# Patient Record
Sex: FEMALE | Race: WHITE | Employment: PART TIME | URBAN - METROPOLITAN AREA
[De-identification: names, ages, dates, MRNs, and addresses within clinical notes are randomized per-mention and may not be internally consistent; named-entity substitution may affect disease eponyms.]

---

## 2017-01-16 ENCOUNTER — ULTRASOUND ENCOUNTER (OUTPATIENT)
Dept: OBGYN CLINIC | Facility: CLINIC | Age: 37
End: 2017-01-16

## 2017-01-16 ENCOUNTER — OFFICE VISIT (OUTPATIENT)
Dept: OBGYN CLINIC | Facility: CLINIC | Age: 37
End: 2017-01-16

## 2017-01-16 VITALS
SYSTOLIC BLOOD PRESSURE: 104 MMHG | WEIGHT: 142 LBS | BODY MASS INDEX: 22.82 KG/M2 | HEIGHT: 66 IN | DIASTOLIC BLOOD PRESSURE: 78 MMHG

## 2017-01-16 DIAGNOSIS — O09.523 AMA (ADVANCED MATERNAL AGE) MULTIGRAVIDA 35+, THIRD TRIMESTER: ICD-10-CM

## 2017-01-16 DIAGNOSIS — Z3A.33 33 WEEKS GESTATION OF PREGNANCY: Primary | ICD-10-CM

## 2017-01-16 DIAGNOSIS — O09.93 HIGH-RISK PREGNANCY, THIRD TRIMESTER: ICD-10-CM

## 2017-01-16 DIAGNOSIS — Z36.89 ENCOUNTER FOR FETAL ANATOMIC SURVEY: ICD-10-CM

## 2017-01-16 PROCEDURE — 59025 FETAL NON-STRESS TEST: CPT | Performed by: OBSTETRICS & GYNECOLOGY

## 2017-01-16 PROCEDURE — 76816 OB US FOLLOW-UP PER FETUS: CPT | Performed by: OBSTETRICS & GYNECOLOGY

## 2017-01-16 NOTE — PROGRESS NOTES
Quick Note:    Ultrasound for ama  33w6d  Measuring 32w2d  efw 1888 gm(18.1%)  Bpd 7.74 cm (5%)  HC 30.18cm (10.2%)  AC 27.47cm (4.3%)  FL 6.26cm (9.7%)  HL 5.56cm (19.2%)  jenelle 14.27cm  fhr 132    ______

## 2017-01-16 NOTE — PROGRESS NOTES
No complaints  U/s today shows SGA with lagging AC, BPD, HC, FL  Spoke to [facility], they will see pt in the next day or so for dopplers  nst today--reactive  Above d/w pt and   Questions are answered  rtc 1 wk with nst

## 2017-01-16 NOTE — PATIENT INSTRUCTIONS
FETAL MOVEMENT CHART    Begin counting the baby's movements when you awake in the morning, or at approximately 9:00 a.m. Count ten separate times that the baby moves. A movement can be either a kick, a swish, a turn or a flip of the baby inside.     Gita Duff

## 2017-01-19 ENCOUNTER — OFFICE VISIT (OUTPATIENT)
Dept: PERINATAL CARE | Facility: HOSPITAL | Age: 37
End: 2017-01-19
Attending: OBSTETRICS & GYNECOLOGY
Payer: COMMERCIAL

## 2017-01-19 VITALS
HEIGHT: 66 IN | RESPIRATION RATE: 14 BRPM | DIASTOLIC BLOOD PRESSURE: 74 MMHG | WEIGHT: 142 LBS | SYSTOLIC BLOOD PRESSURE: 130 MMHG | HEART RATE: 74 BPM | BODY MASS INDEX: 22.82 KG/M2

## 2017-01-19 DIAGNOSIS — O09.523 AMA (ADVANCED MATERNAL AGE) MULTIGRAVIDA 35+, THIRD TRIMESTER: Primary | ICD-10-CM

## 2017-01-19 PROCEDURE — 99242 OFF/OP CONSLTJ NEW/EST SF 20: CPT

## 2017-01-19 PROCEDURE — 76819 FETAL BIOPHYS PROFIL W/O NST: CPT

## 2017-01-19 PROCEDURE — 76820 UMBILICAL ARTERY ECHO: CPT

## 2017-01-19 NOTE — PROGRESS NOTES
Outpatient Maternal-Fetal Medicine Consultation    Dear Dr. Sesar Farr    Thank you for requesting ultrasound evaluation and maternal fetal medicine consultation on your patient Cayla Nascimento.   As you are aware she is a 43year old female [facility] with gestation.   Biometry:  BPD 80.9 mm 8th% 32w4d (31w3d to 33w4d)  .9 mm <5th% 33w3d (30w3d to 36w3d)  .8 mm 21st% 33w1d (32w1d to 34w0d)  FL 63.6 mm 11th% 32w6d (29w6d to 35w6d)  .1 mm 27th% 32w6d  TCD 46.8 mm >95th%  HUM 55.1 mm 15th% 32w ____________________________________________________________________________    I interpreted the results and reviewed them with the patient.     DISCUSSION  During her visit we discussed and reviewed the following issues:  ADVANCED MATERNAL AGE    Back non-invasive pregnancy testing (NIPT) offers the highest detection rate (with the lowest false positive rate) for the detection of fetal aneuploidy amongst high-risk patients.   The limitations of detailed mid-trimester sonography was reviewed with the joe

## 2017-01-19 NOTE — PROGRESS NOTES
Pt here for growth US/ AMA/ AC 4.3%  States +FM  Denies complaints     Mat. T21: WNL  GENDER: FEMALE

## 2017-01-26 ENCOUNTER — APPOINTMENT (OUTPATIENT)
Dept: OBGYN CLINIC | Facility: CLINIC | Age: 37
End: 2017-01-26

## 2017-01-26 ENCOUNTER — OFFICE VISIT (OUTPATIENT)
Dept: OBGYN CLINIC | Facility: CLINIC | Age: 37
End: 2017-01-26

## 2017-01-26 VITALS
HEIGHT: 66 IN | BODY MASS INDEX: 22.66 KG/M2 | SYSTOLIC BLOOD PRESSURE: 110 MMHG | WEIGHT: 141 LBS | DIASTOLIC BLOOD PRESSURE: 60 MMHG

## 2017-01-26 DIAGNOSIS — O09.93 HIGH-RISK PREGNANCY, THIRD TRIMESTER: ICD-10-CM

## 2017-01-26 DIAGNOSIS — O09.523 AMA (ADVANCED MATERNAL AGE) MULTIGRAVIDA 35+, THIRD TRIMESTER: Primary | ICD-10-CM

## 2017-01-26 PROCEDURE — 87081 CULTURE SCREEN ONLY: CPT | Performed by: OBSTETRICS & GYNECOLOGY

## 2017-01-26 PROCEDURE — 59025 FETAL NON-STRESS TEST: CPT | Performed by: OBSTETRICS & GYNECOLOGY

## 2017-01-26 NOTE — PATIENT INSTRUCTIONS
[facility] Labor Instructions  How do I know if it’s true labor? • One of the most important aspects of any pregnancy is being able to recognize the onset of labor.   Unfortunately, on occasion it can be difficult or confusing, especially if you have had o always) in the beginning. They are cramp-like in character and feel similar to menstrual cramps. After a while, they become more regular, and they seem to last a little longer, and feel a little sharper.   These symptoms are very important-more important- (). What will happen when I get to the hospital?  ? When you arrive at the hospital, you will be admitted and examined.    There are a few factors that will determine if you will be allowed to be up out of bed or if you would need to stay in b like toothbrush, shampoo, hairbrush and etc.   • You can bring your favorite pillow, but please put a colored pillow case on it so it doesn’t get mixed up with hospital pillows.     How long will I stay in the hospital?  The date you leave the hospital may

## 2017-01-26 NOTE — PROGRESS NOTES
No C/O, good FM  NST reactive, some contractions discussed  SVE: long/closed/high,   GBS done  LI given  1 week

## 2017-02-03 ENCOUNTER — OFFICE VISIT (OUTPATIENT)
Dept: OBGYN CLINIC | Facility: CLINIC | Age: 37
End: 2017-02-03

## 2017-02-03 VITALS — WEIGHT: 140 LBS | SYSTOLIC BLOOD PRESSURE: 124 MMHG | DIASTOLIC BLOOD PRESSURE: 60 MMHG | BODY MASS INDEX: 23 KG/M2

## 2017-02-03 DIAGNOSIS — O09.93 HIGH-RISK PREGNANCY, THIRD TRIMESTER: ICD-10-CM

## 2017-02-03 DIAGNOSIS — O09.523 AMA (ADVANCED MATERNAL AGE) MULTIGRAVIDA 35+, THIRD TRIMESTER: Primary | ICD-10-CM

## 2017-02-03 PROCEDURE — 59025 FETAL NON-STRESS TEST: CPT | Performed by: OBSTETRICS & GYNECOLOGY

## 2017-02-03 NOTE — PROGRESS NOTES
Had a little viral gastroenteritis, good now  Good FM  NST reactive  Feeling some contractions  1 week, NST

## 2017-02-10 ENCOUNTER — OFFICE VISIT (OUTPATIENT)
Dept: OBGYN CLINIC | Facility: CLINIC | Age: 37
End: 2017-02-10

## 2017-02-10 VITALS
DIASTOLIC BLOOD PRESSURE: 66 MMHG | HEIGHT: 66 IN | BODY MASS INDEX: 22.82 KG/M2 | WEIGHT: 142 LBS | SYSTOLIC BLOOD PRESSURE: 112 MMHG

## 2017-02-10 DIAGNOSIS — O09.523 AMA (ADVANCED MATERNAL AGE) MULTIGRAVIDA 35+, THIRD TRIMESTER: ICD-10-CM

## 2017-02-10 DIAGNOSIS — O09.93 HIGH-RISK PREGNANCY, THIRD TRIMESTER: Primary | ICD-10-CM

## 2017-02-10 DIAGNOSIS — O09.523 AMA (ADVANCED MATERNAL AGE) MULTIGRAVIDA 35+, THIRD TRIMESTER: Primary | ICD-10-CM

## 2017-02-10 PROCEDURE — 59025 FETAL NON-STRESS TEST: CPT | Performed by: OBSTETRICS & GYNECOLOGY

## 2017-02-10 NOTE — PROGRESS NOTES
No contractions. Head well into pelvis which will explain HOF measurement. Had growth ultrasound with MFM on Monday. See in one week with NST unless delivery indicated by ultrasound findings.

## 2017-02-13 ENCOUNTER — OFFICE VISIT (OUTPATIENT)
Dept: OBGYN CLINIC | Facility: CLINIC | Age: 37
End: 2017-02-13

## 2017-02-13 ENCOUNTER — OFFICE VISIT (OUTPATIENT)
Dept: PERINATAL CARE | Facility: HOSPITAL | Age: 37
End: 2017-02-13
Attending: OBSTETRICS & GYNECOLOGY
Payer: COMMERCIAL

## 2017-02-13 ENCOUNTER — TELEPHONE (OUTPATIENT)
Dept: OBGYN CLINIC | Facility: CLINIC | Age: 37
End: 2017-02-13

## 2017-02-13 VITALS
HEART RATE: 81 BPM | DIASTOLIC BLOOD PRESSURE: 78 MMHG | SYSTOLIC BLOOD PRESSURE: 146 MMHG | WEIGHT: 142 LBS | BODY MASS INDEX: 23 KG/M2

## 2017-02-13 DIAGNOSIS — O36.5931 IUGR (INTRAUTERINE GROWTH RESTRICTION) AFFECTING CARE OF MOTHER, THIRD TRIMESTER, FETUS 1: Primary | ICD-10-CM

## 2017-02-13 DIAGNOSIS — O09.523 AMA (ADVANCED MATERNAL AGE) MULTIGRAVIDA 35+, THIRD TRIMESTER: Primary | ICD-10-CM

## 2017-02-13 DIAGNOSIS — O36.5990 IUGR, ANTENATAL: ICD-10-CM

## 2017-02-13 PROCEDURE — 76820 UMBILICAL ARTERY ECHO: CPT

## 2017-02-13 PROCEDURE — 76819 FETAL BIOPHYS PROFIL W/O NST: CPT

## 2017-02-13 PROCEDURE — 99214 OFFICE O/P EST MOD 30 MIN: CPT

## 2017-02-13 PROCEDURE — 76821 MIDDLE CEREBRAL ARTERY ECHO: CPT

## 2017-02-13 NOTE — PROGRESS NOTES
Discussed scan with Dr. Jolley Spotted, IUGR, <5th percentile  Recommends delivery this week  IOL discussed, risks discussed  She will decide on date, call us tomorrow or tonight

## 2017-02-13 NOTE — TELEPHONE ENCOUNTER
Left message on answering machine to call back. Per Dr. Maggie Hutchinson, patient needs Cervidil tomorrow evening. Notes from [facility] state deliver within next 1-2 days.

## 2017-02-13 NOTE — TELEPHONE ENCOUNTER
Patient said someone called and said she should come in on Tuesday to be induced. Please call her tonight.

## 2017-02-13 NOTE — PROGRESS NOTES
Indication: small ac. Maternal age (43 years). ____________________________________________________________________________  History: Age: 43 years. Maternal age at [facility]: 43 years.  : 1 Para: 0.  ___________________________________________________ ratio 4.28     RI 0.77    Right Middle Cerebral Artery: PS 54.7 cm/s    ED 17.25 cm/s   S/D ratio 3.17   RI 0.68     PI 1.25     TAMX 29.97 cm/s   Impression: impaired placental bloodflow  (increased S/D ratio).      ________________________________________ coordination of care. Greater than 50% of this time was spent in face to face discussion with the patient. Discussed with Dr. Angeline Del Castillo.

## 2017-02-14 ENCOUNTER — HOSPITAL ENCOUNTER (INPATIENT)
Facility: HOSPITAL | Age: 37
LOS: 3 days | Discharge: HOME | End: 2017-02-17
Attending: OBSTETRICS & GYNECOLOGY | Admitting: OBSTETRICS & GYNECOLOGY
Payer: COMMERCIAL

## 2017-02-14 ENCOUNTER — TELEPHONE (OUTPATIENT)
Dept: OBGYN UNIT | Facility: HOSPITAL | Age: 37
End: 2017-02-14

## 2017-02-14 ENCOUNTER — HOSPITAL ENCOUNTER (INPATIENT)
Dept: OBGYN CLINIC | Facility: HOSPITAL | Age: 37
Discharge: HOME | End: 2017-02-14
Payer: COMMERCIAL

## 2017-02-14 PROBLEM — Z34.90 PREGNANCY (HCC): Status: ACTIVE | Noted: 2017-02-14

## 2017-02-14 PROBLEM — Z34.90 PREGNANCY: Status: ACTIVE | Noted: 2017-02-14

## 2017-02-14 LAB
ANTIBODY SCREEN: NEGATIVE
BILIRUBIN URINE: NEGATIVE
CONTROL RUN WITHIN 24 HOURS?: YES
ERYTHROCYTE [DISTWIDTH] IN BLOOD BY AUTOMATED COUNT: 13.4 % (ref 11.5–16)
GLUCOSE URINE: NEGATIVE
HCT VFR BLD AUTO: 34.5 % (ref 34–50)
HGB BLD-MCNC: 11.7 G/DL (ref 12–16)
KETONE URINE: 15
LEUKOCYTE ESTERASE URINE: NEGATIVE
MCH RBC QN AUTO: 30.6 PG (ref 27–33.2)
MCHC RBC AUTO-ENTMCNC: 33.9 G/DL (ref 31–37)
MCV RBC AUTO: 90.3 FL (ref 81–100)
NITRITE URINE: NEGATIVE
PH URINE: 6 (ref 5–8)
PLATELET # BLD AUTO: 308 10(3)UL (ref 150–450)
PROTEIN URINE: NEGATIVE
RBC # BLD AUTO: 3.82 X10(6)UL (ref 3.8–5.1)
RED CELL DISTRIBUTION WIDTH-SD: 43.9 FL (ref 35.1–46.3)
RH BLOOD TYPE: POSITIVE
SPEC GRAVITY: 1.02 (ref 1–1.03)
T PALLIDUM AB SER QL IA: NONREACTIVE
URINE CLARITY: CLEAR
URINE COLOR: YELLOW
UROBILINOGEN URINE: 0.2
WBC # BLD AUTO: 11.8 X10(3) UL (ref 4–13)

## 2017-02-14 PROCEDURE — 86901 BLOOD TYPING SEROLOGIC RH(D): CPT | Performed by: OBSTETRICS & GYNECOLOGY

## 2017-02-14 PROCEDURE — 88307 TISSUE EXAM BY PATHOLOGIST: CPT | Performed by: OBSTETRICS & GYNECOLOGY

## 2017-02-14 PROCEDURE — 85027 COMPLETE CBC AUTOMATED: CPT | Performed by: OBSTETRICS & GYNECOLOGY

## 2017-02-14 PROCEDURE — 86850 RBC ANTIBODY SCREEN: CPT | Performed by: OBSTETRICS & GYNECOLOGY

## 2017-02-14 PROCEDURE — 86900 BLOOD TYPING SEROLOGIC ABO: CPT | Performed by: OBSTETRICS & GYNECOLOGY

## 2017-02-14 PROCEDURE — 86780 TREPONEMA PALLIDUM: CPT | Performed by: OBSTETRICS & GYNECOLOGY

## 2017-02-14 RX ORDER — SODIUM CHLORIDE, SODIUM LACTATE, POTASSIUM CHLORIDE, CALCIUM CHLORIDE 600; 310; 30; 20 MG/100ML; MG/100ML; MG/100ML; MG/100ML
INJECTION, SOLUTION INTRAVENOUS CONTINUOUS
Status: DISCONTINUED | OUTPATIENT
Start: 2017-02-14 | End: 2017-02-15 | Stop reason: HOSPADM

## 2017-02-14 RX ORDER — IBUPROFEN 600 MG/1
600 TABLET ORAL ONCE AS NEEDED
Status: DISCONTINUED | OUTPATIENT
Start: 2017-02-14 | End: 2017-02-15 | Stop reason: HOSPADM

## 2017-02-14 RX ORDER — ZOLPIDEM TARTRATE 5 MG/1
5 TABLET ORAL NIGHTLY PRN
Status: DISCONTINUED | OUTPATIENT
Start: 2017-02-14 | End: 2017-02-15 | Stop reason: HOSPADM

## 2017-02-14 RX ORDER — NALBUPHINE HCL 10 MG/ML
2.5 AMPUL (ML) INJECTION
Status: DISCONTINUED | OUTPATIENT
Start: 2017-02-14 | End: 2017-02-17

## 2017-02-14 RX ORDER — TERBUTALINE SULFATE 1 MG/ML
0.25 INJECTION, SOLUTION SUBCUTANEOUS AS NEEDED
Status: DISCONTINUED | OUTPATIENT
Start: 2017-02-14 | End: 2017-02-15 | Stop reason: HOSPADM

## 2017-02-14 RX ORDER — HYDROMORPHONE HYDROCHLORIDE 1 MG/ML
0.5 INJECTION, SOLUTION INTRAMUSCULAR; INTRAVENOUS; SUBCUTANEOUS ONCE
Status: DISCONTINUED | OUTPATIENT
Start: 2017-02-14 | End: 2017-02-17

## 2017-02-14 RX ORDER — EPHEDRINE SULFATE 50 MG/ML
5 INJECTION, SOLUTION INTRAVENOUS AS NEEDED
Status: DISCONTINUED | OUTPATIENT
Start: 2017-02-14 | End: 2017-02-15

## 2017-02-14 RX ORDER — DEXTROSE, SODIUM CHLORIDE, SODIUM LACTATE, POTASSIUM CHLORIDE, AND CALCIUM CHLORIDE 5; .6; .31; .03; .02 G/100ML; G/100ML; G/100ML; G/100ML; G/100ML
INJECTION, SOLUTION INTRAVENOUS AS NEEDED
Status: DISCONTINUED | OUTPATIENT
Start: 2017-02-14 | End: 2017-02-15 | Stop reason: HOSPADM

## 2017-02-14 NOTE — TELEPHONE ENCOUNTER
Pt scheduled for Cervidil 2/14/17 at 1800; IOL 2/15/17 at 0600. Patient notified. Patient verbalized understanding.

## 2017-02-15 LAB
BASOPHILS # BLD AUTO: 0.02 X10(3) UL (ref 0–0.1)
BASOPHILS NFR BLD AUTO: 0.1 %
EOSINOPHIL # BLD AUTO: 0.01 X10(3) UL (ref 0–0.3)
EOSINOPHIL NFR BLD AUTO: 0.1 %
ERYTHROCYTE [DISTWIDTH] IN BLOOD BY AUTOMATED COUNT: 13.3 % (ref 11.5–16)
HCT VFR BLD AUTO: 31.8 % (ref 34–50)
HGB BLD-MCNC: 10.9 G/DL (ref 12–16)
IMMATURE GRANULOCYTE COUNT: 0.07 X10(3) UL (ref 0–1)
IMMATURE GRANULOCYTE RATIO %: 0.5 %
LYMPHOCYTES # BLD AUTO: 1.28 X10(3) UL (ref 0.9–4)
LYMPHOCYTES NFR BLD AUTO: 8.9 %
MCH RBC QN AUTO: 30.7 PG (ref 27–33.2)
MCHC RBC AUTO-ENTMCNC: 34.3 G/DL (ref 31–37)
MCV RBC AUTO: 89.6 FL (ref 81–100)
MONOCYTES # BLD AUTO: 1.04 X10(3) UL (ref 0.1–0.6)
MONOCYTES NFR BLD AUTO: 7.2 %
NEUTROPHIL ABS PRELIM: 11.98 X10 (3) UL (ref 1.3–6.7)
NEUTROPHILS # BLD AUTO: 11.98 X10(3) UL (ref 1.3–6.7)
NEUTROPHILS NFR BLD AUTO: 83.2 %
PLATELET # BLD AUTO: 271 10(3)UL (ref 150–450)
RBC # BLD AUTO: 3.55 X10(6)UL (ref 3.8–5.1)
RED CELL DISTRIBUTION WIDTH-SD: 43.8 FL (ref 35.1–46.3)
WBC # BLD AUTO: 14.4 X10(3) UL (ref 4–13)

## 2017-02-15 PROCEDURE — 0HQ9XZZ REPAIR PERINEUM SKIN, EXTERNAL APPROACH: ICD-10-PCS | Performed by: OBSTETRICS & GYNECOLOGY

## 2017-02-15 PROCEDURE — 3E0P7GC INTRODUCTION OF OTHER THERAPEUTIC SUBSTANCE INTO FEMALE REPRODUCTIVE, VIA NATURAL OR ARTIFICIAL OPENING: ICD-10-PCS | Performed by: STUDENT IN AN ORGANIZED HEALTH CARE EDUCATION/TRAINING PROGRAM

## 2017-02-15 RX ORDER — ACETAMINOPHEN 325 MG/1
650 TABLET ORAL EVERY 4 HOURS PRN
Status: DISCONTINUED | OUTPATIENT
Start: 2017-02-15 | End: 2017-02-17

## 2017-02-15 RX ORDER — IBUPROFEN 600 MG/1
600 TABLET ORAL EVERY 6 HOURS
Status: DISCONTINUED | OUTPATIENT
Start: 2017-02-15 | End: 2017-02-17

## 2017-02-15 RX ORDER — HYDROCODONE BITARTRATE AND ACETAMINOPHEN 5; 325 MG/1; MG/1
2 TABLET ORAL EVERY 4 HOURS PRN
Status: DISCONTINUED | OUTPATIENT
Start: 2017-02-15 | End: 2017-02-17

## 2017-02-15 RX ORDER — BISACODYL 10 MG
10 SUPPOSITORY, RECTAL RECTAL ONCE AS NEEDED
Status: ACTIVE | OUTPATIENT
Start: 2017-02-15 | End: 2017-02-15

## 2017-02-15 RX ORDER — SIMETHICONE 80 MG
80 TABLET,CHEWABLE ORAL 3 TIMES DAILY PRN
Status: DISCONTINUED | OUTPATIENT
Start: 2017-02-15 | End: 2017-02-17

## 2017-02-15 RX ORDER — DOCUSATE SODIUM 100 MG/1
100 CAPSULE, LIQUID FILLED ORAL
Status: DISCONTINUED | OUTPATIENT
Start: 2017-02-15 | End: 2017-02-17

## 2017-02-15 RX ORDER — ZOLPIDEM TARTRATE 5 MG/1
5 TABLET ORAL NIGHTLY PRN
Status: DISCONTINUED | OUTPATIENT
Start: 2017-02-15 | End: 2017-02-17

## 2017-02-15 RX ORDER — HYDROCODONE BITARTRATE AND ACETAMINOPHEN 5; 325 MG/1; MG/1
1 TABLET ORAL EVERY 4 HOURS PRN
Status: DISCONTINUED | OUTPATIENT
Start: 2017-02-15 | End: 2017-02-17

## 2017-02-15 RX ORDER — DIAPER,BRIEF,INFANT-TODD,DISP
EACH MISCELLANEOUS EVERY 6 HOURS PRN
Status: DISCONTINUED | OUTPATIENT
Start: 2017-02-15 | End: 2017-02-17

## 2017-02-15 RX ORDER — ONDANSETRON 2 MG/ML
4 INJECTION INTRAMUSCULAR; INTRAVENOUS EVERY 6 HOURS PRN
Status: DISCONTINUED | OUTPATIENT
Start: 2017-02-15 | End: 2017-02-17

## 2017-02-15 NOTE — H&P
[facility]  History & Physical    [facility] Patient Status:  Inpatient    1980 MRN [id number]   [facility] 1NW-A Attending Sabrina Flores MD   Hosp Day # 1 PCP No primary care provider on file.      Subjective:  Niya Bermeo Prenatal Record and labor summary    Pertinent Risk Factors:  ama iugr    Assessment/Plan:    IUP at 38 1/7 weeks  cdil with progression into labor         Risks, benefits, alternatives and possible complications have been discussed in detail with the joe

## 2017-02-15 NOTE — PROGRESS NOTES
Pt is a 43year old female admitted to 110/110-A. Patient presents with:  Scheduled Induction     C-dil induction for IUGR . Pt is  38w0d intra-uterine pregnancy. History obtained, consents signed. Oriented to room, staff, and plan of care.

## 2017-02-15 NOTE — PROGRESS NOTES
Patient up to bathroom with assist x 2. Voided 800mL. Patient transferred to mother/baby room 2207 per wheelchair in stable condition with baby and personal belongings. Accompanied by significant other and staff. Report given to mother/baby RN, Louie Covarrubias.

## 2017-02-15 NOTE — PROGRESS NOTES
Pt received in room 2207 via wheelchair, carrying infant. Both stable.  pushing cart of belongings. Hugs and kisses already in place. ID bands verified with transfer RN. Oriented to room and plan of care. Bed low, locked.  Call light to pt with bernard

## 2017-02-15 NOTE — PLAN OF CARE
POSTPARTUM    • Long Term Goal:Experiences normal postpartum course Progressing    • Optimize infant feeding at the breast Progressing    • Establishment of adequate milk supply with medication/procedure interruptions Progressing    • Appropriate maternal

## 2017-02-15 NOTE — L&D DELIVERY NOTE
Vaginal Delivery Note          Amador Londono Patient Status:  Inpatient    1980 MRN [id number]   [facility] [place] 1NW-A Attending Ray Sethi MD   Hosp Day # 1 PCP No alayna vicryl rapide. Rectal-vaginal exam was normal.     Bleeding was minimal.  The patient was then moved to the supine position in stable condition. Counts were correct. Complications:  None    Mother and infant in good condition.

## 2017-02-15 NOTE — PROGRESS NOTES
Vacuum assisted vaginal delivery, viable female infant. Cord clamped and cut. Infant placed to mother's abd for tactile stimulation. Vigorous cry noted.

## 2017-02-15 NOTE — PROGRESS NOTES
[facility]  Post-Partum Progress Note    Cristian Corona Patient Status:  Inpatient    1980 MRN [id number]   [facility] 2SW-J Attending Terrance Stratton MD   Hosp Day # 1 PCP No primary care provider on file.      Radha Grubbs

## 2017-02-16 NOTE — PLAN OF CARE
Problem: SAFETY ADULT - FALL  Goal: Free from fall injury  INTERVENTIONS:  - Assess pt frequently for physical needs  - Identify cognitive and physical deficits and behaviors that affect risk of falls.   - [place] fall precautions as indicated by assessme with breast feeding.  - Provide information as needed about early infant feeding cues (e.g., rooting, lip smacking, sucking fingers/hand) versus late cue of crying.  - Discuss/demonstrate breast feeding aids (e.g., infant sling, nursing footstool/pillows,

## 2017-02-16 NOTE — PLAN OF CARE
POSTPARTUM    • Long Term Goal:Experiences normal postpartum course Completed          POSTPARTUM    • Optimize infant feeding at the breast Progressing    • Appropriate maternal -  bonding Progressing

## 2017-02-16 NOTE — PROGRESS NOTES
PPD # 1. Doing well. No complaints. Voiding without issue. Bleeding decreased from yesterday.  + Flatus. No BM. Natty Shannon, doing fine.     /80 mmHg  Pulse 124  Temp(Src) 98 °F (36.7 °C) (Oral)  Resp 18  Ht 65.98\"  Wt 142 lb  BMI 22.93 kg

## 2017-02-17 VITALS
HEART RATE: 75 BPM | SYSTOLIC BLOOD PRESSURE: 116 MMHG | HEIGHT: 65.98 IN | WEIGHT: 142 LBS | BODY MASS INDEX: 22.82 KG/M2 | TEMPERATURE: 98 F | DIASTOLIC BLOOD PRESSURE: 70 MMHG | RESPIRATION RATE: 18 BRPM

## 2017-02-17 NOTE — PROGRESS NOTES
Discharge teaching on mom and baby care completed. All questions and concerns addressed. Pt verbalizes good understanding on information given. Anticipating discharge later today. Pumping and supp w formula also, enc call to lac outpt as needed.

## 2017-02-17 NOTE — PROGRESS NOTES
PPD # 2. Doing well and ready to go home. No complaints. Voiding without issue. Bleeding decreased from yesterday.  + Flatus. No BM.     /76 mmHg  Pulse 76  Temp(Src) 97.9 °F (36.6 °C) (Oral)  Resp 18  Ht 65.98\"  Wt 142 lb  BMI 22.93 kg/m2  LMP

## 2017-02-19 ENCOUNTER — TELEPHONE (OUTPATIENT)
Dept: OBGYN UNIT | Facility: HOSPITAL | Age: 37
End: 2017-02-19

## 2017-02-19 NOTE — PROGRESS NOTES
Outgoing cradle call completed. Mom reports that she and infant are doing well. No complaints of PPB or PPD. Has pediatrician F/U visit scheduled for tomorrow 2/20 @ 1pm.  Has PP F/U visit scheduled.  Reviewed basic infant and self care; verbalizes Yulisa

## 2017-02-23 ENCOUNTER — TELEPHONE (OUTPATIENT)
Dept: OBGYN CLINIC | Facility: CLINIC | Age: 37
End: 2017-02-23

## 2017-02-28 ENCOUNTER — APPOINTMENT (OUTPATIENT)
Dept: LACTATION | Facility: HOSPITAL | Age: 37
End: 2017-02-28
Attending: OBSTETRICS & GYNECOLOGY
Payer: COMMERCIAL

## 2017-02-28 ENCOUNTER — TELEPHONE (OUTPATIENT)
Dept: OBGYN CLINIC | Facility: CLINIC | Age: 37
End: 2017-02-28

## 2017-02-28 RX ORDER — BREAST PUMP
EACH MISCELLANEOUS
Qty: 1 EACH | Refills: 0 | Status: SHIPPED | OUTPATIENT
Start: 2017-02-28 | End: 2017-05-22

## 2017-02-28 NOTE — TELEPHONE ENCOUNTER
Pt would like breast pump from [employer]. Advised pt to call, set up acct, and [employer] will fax us an order. Patient verbalized understanding.

## 2017-02-28 NOTE — TELEPHONE ENCOUNTER
Order entered for breast pump to [facility].  Printed, signed and faxed. Patient notified.

## 2017-02-28 NOTE — TELEPHONE ENCOUNTER
Patient called back, the [facility] did not have the pump she wanted, so she called [facility] and they do have it. She needs an order with the diagnosis and procedure code. Please put order on my chart so that patient can print it out if possible.  Sunday

## 2017-03-01 ENCOUNTER — APPOINTMENT (OUTPATIENT)
Dept: LACTATION | Facility: HOSPITAL | Age: 37
End: 2017-03-01
Attending: OBSTETRICS & GYNECOLOGY
Payer: COMMERCIAL

## 2017-03-25 ENCOUNTER — OFFICE VISIT (OUTPATIENT)
Dept: OBGYN CLINIC | Facility: CLINIC | Age: 37
End: 2017-03-25

## 2017-03-25 VITALS
WEIGHT: 127 LBS | DIASTOLIC BLOOD PRESSURE: 60 MMHG | SYSTOLIC BLOOD PRESSURE: 110 MMHG | HEIGHT: 66 IN | HEART RATE: 88 BPM | BODY MASS INDEX: 20.41 KG/M2

## 2017-03-25 NOTE — PROGRESS NOTES
GYNE postpartum note     S: patient is a 43yo wf who presents today for post partum visit. She underwent vacuum assisted delivery  on 02/15/17. She is doing well, still breast feeding . Has no complaints.  Bleeding is minimal now   Denies any depressed mo

## 2017-05-22 ENCOUNTER — OFFICE VISIT (OUTPATIENT)
Dept: FAMILY MEDICINE CLINIC | Facility: CLINIC | Age: 37
End: 2017-05-22

## 2017-05-22 VITALS
DIASTOLIC BLOOD PRESSURE: 78 MMHG | BODY MASS INDEX: 20.74 KG/M2 | RESPIRATION RATE: 12 BRPM | HEART RATE: 76 BPM | SYSTOLIC BLOOD PRESSURE: 132 MMHG | TEMPERATURE: 98 F | WEIGHT: 126 LBS | HEIGHT: 65.25 IN | OXYGEN SATURATION: 100 %

## 2017-05-22 DIAGNOSIS — M65.9 TENOSYNOVITIS OF THUMB: ICD-10-CM

## 2017-05-22 DIAGNOSIS — Z00.00 ROUTINE ADULT HEALTH MAINTENANCE: Primary | ICD-10-CM

## 2017-05-22 PROCEDURE — 99202 OFFICE O/P NEW SF 15 MIN: CPT | Performed by: FAMILY MEDICINE

## 2017-05-22 PROCEDURE — 99385 PREV VISIT NEW AGE 18-39: CPT | Performed by: FAMILY MEDICINE

## 2017-05-22 RX ORDER — PREDNISONE 20 MG/1
20 TABLET ORAL 2 TIMES DAILY
Qty: 10 TABLET | Refills: 0 | Status: SHIPPED | OUTPATIENT
Start: 2017-05-22 | End: 2017-05-27

## 2017-05-22 NOTE — PROGRESS NOTES
HPI:   Laina Monteiro is a 43year old female who presents for a complete physical exam. Symptoms: denies discharge, itching, burning or dysuria. Patient has complaint of having pain at the base of her thumb on her left hand for the past 1 month.   Sh Case: [id number]                                   Authorizing Provider:  Rafat Robert MD      Collected:           02/14/2017                   Ordering Location:     [facility] 1NW-A      Received:            02/15/2017 11:42 AM          Path The disk is serially sectioned and the cotyledons   show their normal dark red spongy architecture. There are no areas of   hemorrhage or infarction.    Representative sections are submitted as   follows in cassettes:    A1 -  Cord and membranes   A2-A4 - symptoms  LUNGS: denies cough or shortness of breath with exertion  CHEST:  denies breast changes or pain  CARDIOVASCULAR: denies chest pain or tightness on exertion: no edema  VASCULAR: denies leg cramps  GI: denies abdominal pain, bowel movement changes, monthly. Patient instructed on getting yearly Pap smear and mammogram.     Patient educated on taking calcium and Vit D supplementation and on osteoporosis and bone density testing.      Aerobic exercise 30 minutes five days a week for cardiovascular fi

## 2017-05-25 ENCOUNTER — LAB ENCOUNTER (OUTPATIENT)
Dept: LAB | Age: 37
End: 2017-05-25
Attending: FAMILY MEDICINE
Payer: COMMERCIAL

## 2017-05-25 DIAGNOSIS — Z00.00 ROUTINE ADULT HEALTH MAINTENANCE: ICD-10-CM

## 2017-05-25 DIAGNOSIS — Z00.00 ROUTINE GENERAL MEDICAL EXAMINATION AT A HEALTH CARE FACILITY: Primary | ICD-10-CM

## 2017-05-25 PROCEDURE — 80061 LIPID PANEL: CPT | Performed by: FAMILY MEDICINE

## 2017-05-25 PROCEDURE — 80050 GENERAL HEALTH PANEL: CPT | Performed by: FAMILY MEDICINE

## 2017-05-25 PROCEDURE — 36415 COLL VENOUS BLD VENIPUNCTURE: CPT | Performed by: FAMILY MEDICINE

## 2017-05-25 PROCEDURE — 84439 ASSAY OF FREE THYROXINE: CPT | Performed by: FAMILY MEDICINE

## 2017-09-11 ENCOUNTER — OFFICE VISIT (OUTPATIENT)
Dept: OBGYN CLINIC | Facility: CLINIC | Age: 37
End: 2017-09-11

## 2017-09-11 VITALS
BODY MASS INDEX: 22.34 KG/M2 | DIASTOLIC BLOOD PRESSURE: 80 MMHG | WEIGHT: 139 LBS | SYSTOLIC BLOOD PRESSURE: 128 MMHG | HEIGHT: 66 IN

## 2017-09-11 DIAGNOSIS — O20.0 THREATENED ABORTION: Primary | ICD-10-CM

## 2017-09-11 PROCEDURE — 99213 OFFICE O/P EST LOW 20 MIN: CPT | Performed by: OBSTETRICS & GYNECOLOGY

## 2017-09-11 PROCEDURE — 76817 TRANSVAGINAL US OBSTETRIC: CPT | Performed by: OBSTETRICS & GYNECOLOGY

## 2017-09-11 RX ORDER — PRENATAL VIT/IRON FUM/FOLIC AC 27MG-0.8MG
1 TABLET ORAL DAILY
COMMUNITY
End: 2018-07-17

## 2017-09-11 NOTE — PROGRESS NOTES
C/O bleeding started Saturday, not as heavy as period  Nursed for 6 weeks  Had 4 regular cycles, LMP 7/20/17,  7 w 4 d by dates    ROS: No Cardiac, Respiratory, GI,  or Neurological symptoms.     PE:  Abdomen soft, non-tender  Pelvic:External vag normal,

## 2017-09-14 ENCOUNTER — APPOINTMENT (OUTPATIENT)
Dept: LAB | Age: 37
End: 2017-09-14
Attending: OBSTETRICS & GYNECOLOGY
Payer: COMMERCIAL

## 2017-09-14 DIAGNOSIS — O20.0 THREATENED ABORTION: ICD-10-CM

## 2017-09-14 LAB — HCG QUANTITATIVE: 52 MIU/ML (ref ?–1)

## 2017-09-14 PROCEDURE — 84702 CHORIONIC GONADOTROPIN TEST: CPT | Performed by: OBSTETRICS & GYNECOLOGY

## 2017-09-14 PROCEDURE — 36415 COLL VENOUS BLD VENIPUNCTURE: CPT | Performed by: OBSTETRICS & GYNECOLOGY

## 2017-11-16 ENCOUNTER — TELEPHONE (OUTPATIENT)
Dept: OBGYN CLINIC | Facility: CLINIC | Age: 37
End: 2017-11-16

## 2017-11-16 DIAGNOSIS — O09.299 HISTORY OF MISCARRIAGE, CURRENTLY PREGNANT: Primary | ICD-10-CM

## 2017-11-16 NOTE — TELEPHONE ENCOUNTER
Returned patient's call. She reports a recent +UPT with recent hx of sab. Denies any cramping or bleeding. Orders placed, per protocol, and patient instructed on labs- hcg, progesterone, and blood type tomorrow and repeat hcg on Monday.  Office visit booked

## 2017-11-16 NOTE — TELEPHONE ENCOUNTER
PT called today and states she had a positive pregnancy test.  PT was seen 9/11/17 with Dr. Jt Rudolph and states she had a miscarriage with that pregnancy.  PT states she never got her menses after her \"miscarriage\" and is unsure of LMP for this \"new\" pregna

## 2017-11-17 ENCOUNTER — LAB ENCOUNTER (OUTPATIENT)
Dept: LAB | Age: 37
End: 2017-11-17
Attending: OBSTETRICS & GYNECOLOGY
Payer: COMMERCIAL

## 2017-11-17 DIAGNOSIS — O09.299 HISTORY OF MISCARRIAGE, CURRENTLY PREGNANT: ICD-10-CM

## 2017-11-17 PROCEDURE — 36415 COLL VENOUS BLD VENIPUNCTURE: CPT | Performed by: OBSTETRICS & GYNECOLOGY

## 2017-11-17 PROCEDURE — 86901 BLOOD TYPING SEROLOGIC RH(D): CPT | Performed by: OBSTETRICS & GYNECOLOGY

## 2017-11-17 PROCEDURE — 84144 ASSAY OF PROGESTERONE: CPT | Performed by: OBSTETRICS & GYNECOLOGY

## 2017-11-17 PROCEDURE — 84702 CHORIONIC GONADOTROPIN TEST: CPT | Performed by: OBSTETRICS & GYNECOLOGY

## 2017-11-17 PROCEDURE — 86900 BLOOD TYPING SEROLOGIC ABO: CPT | Performed by: OBSTETRICS & GYNECOLOGY

## 2017-11-20 ENCOUNTER — LAB ENCOUNTER (OUTPATIENT)
Dept: LAB | Age: 37
End: 2017-11-20
Attending: OBSTETRICS & GYNECOLOGY
Payer: COMMERCIAL

## 2017-11-20 DIAGNOSIS — O09.299 HISTORY OF MISCARRIAGE, CURRENTLY PREGNANT: ICD-10-CM

## 2017-11-20 PROCEDURE — 84702 CHORIONIC GONADOTROPIN TEST: CPT | Performed by: OBSTETRICS & GYNECOLOGY

## 2017-11-20 PROCEDURE — 36415 COLL VENOUS BLD VENIPUNCTURE: CPT | Performed by: OBSTETRICS & GYNECOLOGY

## 2017-11-20 NOTE — PROGRESS NOTES
Patient informed of results. Verbalized understanding. No further questions or concerns. Patient has follow up appt 11/21.

## 2017-11-21 ENCOUNTER — OFFICE VISIT (OUTPATIENT)
Dept: OBGYN CLINIC | Facility: CLINIC | Age: 37
End: 2017-11-21

## 2017-11-21 VITALS
WEIGHT: 130 LBS | BODY MASS INDEX: 20.89 KG/M2 | DIASTOLIC BLOOD PRESSURE: 76 MMHG | SYSTOLIC BLOOD PRESSURE: 128 MMHG | HEIGHT: 66 IN | HEART RATE: 94 BPM

## 2017-11-21 DIAGNOSIS — O09.299 HISTORY OF MISCARRIAGE, CURRENTLY PREGNANT: Primary | ICD-10-CM

## 2017-11-21 DIAGNOSIS — O09.521 AMA (ADVANCED MATERNAL AGE) MULTIGRAVIDA 35+, FIRST TRIMESTER: ICD-10-CM

## 2017-11-21 DIAGNOSIS — N91.2 AMENORRHEA: ICD-10-CM

## 2017-11-21 PROCEDURE — 99213 OFFICE O/P EST LOW 20 MIN: CPT | Performed by: OBSTETRICS & GYNECOLOGY

## 2017-11-21 PROCEDURE — 76817 TRANSVAGINAL US OBSTETRIC: CPT | Performed by: OBSTETRICS & GYNECOLOGY

## 2017-11-21 NOTE — PROGRESS NOTES
Feel pregnant    hcg [id number]    ROS: No Cardiac, Respiratory, GI,  or Neurological symptoms.     PE:  Abdomen soft, no pain  Cx: normal  Uterus 10 week size    Scan: SIUP,  10w 4d, cardiac activity seen    Has NOB appointment

## 2017-11-29 ENCOUNTER — LAB ENCOUNTER (OUTPATIENT)
Dept: LAB | Age: 37
End: 2017-11-29
Attending: OBSTETRICS & GYNECOLOGY
Payer: COMMERCIAL

## 2017-11-29 DIAGNOSIS — O09.521 AMA (ADVANCED MATERNAL AGE) MULTIGRAVIDA 35+, FIRST TRIMESTER: ICD-10-CM

## 2017-11-29 PROCEDURE — 86901 BLOOD TYPING SEROLOGIC RH(D): CPT | Performed by: OBSTETRICS & GYNECOLOGY

## 2017-11-29 PROCEDURE — 85025 COMPLETE CBC W/AUTO DIFF WBC: CPT | Performed by: OBSTETRICS & GYNECOLOGY

## 2017-11-29 PROCEDURE — 87340 HEPATITIS B SURFACE AG IA: CPT | Performed by: OBSTETRICS & GYNECOLOGY

## 2017-11-29 PROCEDURE — 86900 BLOOD TYPING SEROLOGIC ABO: CPT | Performed by: OBSTETRICS & GYNECOLOGY

## 2017-11-29 PROCEDURE — 86780 TREPONEMA PALLIDUM: CPT | Performed by: OBSTETRICS & GYNECOLOGY

## 2017-11-29 PROCEDURE — 87086 URINE CULTURE/COLONY COUNT: CPT | Performed by: OBSTETRICS & GYNECOLOGY

## 2017-11-29 PROCEDURE — 86850 RBC ANTIBODY SCREEN: CPT | Performed by: OBSTETRICS & GYNECOLOGY

## 2017-11-29 PROCEDURE — 36415 COLL VENOUS BLD VENIPUNCTURE: CPT | Performed by: OBSTETRICS & GYNECOLOGY

## 2017-11-29 PROCEDURE — 86762 RUBELLA ANTIBODY: CPT | Performed by: OBSTETRICS & GYNECOLOGY

## 2017-11-29 PROCEDURE — 87389 HIV-1 AG W/HIV-1&-2 AB AG IA: CPT | Performed by: OBSTETRICS & GYNECOLOGY

## 2017-12-05 ENCOUNTER — TELEPHONE (OUTPATIENT)
Dept: OBGYN CLINIC | Facility: CLINIC | Age: 37
End: 2017-12-05

## 2017-12-05 NOTE — TELEPHONE ENCOUNTER
[id number] results reviewed. Negative for chromosome abnormality of 21, 18 and 13. Genetic screening within normal limits and no further intervention indicated at this time.      IF PATIENT DESIRES TO KNOW (PLEASE CONFIRM BEFORE INCLUDING THIS INFORMATION)

## 2017-12-05 NOTE — TELEPHONE ENCOUNTER
Received call back from patient. Reported results as noted by Dr. Tiff Sanchez as well as sex of baby, per pt request. Patient states understanding and has no questions at this time.

## 2017-12-05 NOTE — TELEPHONE ENCOUNTER
Received Materni T21 Result in [place] in Dr. Poonam Proctor desramon in [address] [place] [address]

## 2017-12-11 ENCOUNTER — MED REC SCAN ONLY (OUTPATIENT)
Dept: OBGYN CLINIC | Facility: CLINIC | Age: 37
End: 2017-12-11

## 2017-12-13 ENCOUNTER — OFFICE VISIT (OUTPATIENT)
Dept: OBGYN CLINIC | Facility: CLINIC | Age: 37
End: 2017-12-13

## 2017-12-13 VITALS
DIASTOLIC BLOOD PRESSURE: 62 MMHG | HEART RATE: 82 BPM | SYSTOLIC BLOOD PRESSURE: 128 MMHG | HEIGHT: 64.75 IN | WEIGHT: 129 LBS | BODY MASS INDEX: 21.75 KG/M2

## 2017-12-13 DIAGNOSIS — O09.521 AMA (ADVANCED MATERNAL AGE) MULTIGRAVIDA 35+, FIRST TRIMESTER: ICD-10-CM

## 2017-12-13 DIAGNOSIS — Z87.59 HISTORY OF PRIOR PREGNANCY WITH SGA NEWBORN: ICD-10-CM

## 2017-12-13 DIAGNOSIS — O09.91 SUPERVISION OF HIGH RISK PREGNANCY IN FIRST TRIMESTER: Primary | ICD-10-CM

## 2017-12-13 DIAGNOSIS — O09.299 H/O MISCARRIAGE, CURRENTLY PREGNANT: ICD-10-CM

## 2017-12-13 PROBLEM — O36.5990 IUGR (INTRAUTERINE GROWTH RESTRICTION) AFFECTING CARE OF MOTHER: Status: RESOLVED | Noted: 2017-02-13 | Resolved: 2017-12-13

## 2017-12-13 PROBLEM — Z34.90 PREGNANCY: Status: RESOLVED | Noted: 2017-02-14 | Resolved: 2017-12-13

## 2017-12-13 PROBLEM — Z34.90 PREGNANCY (HCC): Status: RESOLVED | Noted: 2017-02-14 | Resolved: 2017-12-13

## 2017-12-13 PROBLEM — O36.5990 IUGR, ANTENATAL: Status: RESOLVED | Noted: 2017-02-13 | Resolved: 2017-12-13

## 2017-12-13 PROBLEM — O36.5990 IUGR, ANTENATAL (HCC): Status: RESOLVED | Noted: 2017-02-13 | Resolved: 2017-12-13

## 2017-12-13 PROBLEM — O36.5990 IUGR (INTRAUTERINE GROWTH RESTRICTION) AFFECTING CARE OF MOTHER (HCC): Status: RESOLVED | Noted: 2017-02-13 | Resolved: 2017-12-13

## 2017-12-13 NOTE — PROGRESS NOTES
[id number] white female for NOB. Seen in September for spontaneous loss. No intervening menses prior to this conception. Saw Dr Jada Contreras in November 21st and ultrasound with SIUP at [address] [place] [facility] and established RUSS. No problems to date.  Last seen for exam at postpartum

## 2018-01-10 ENCOUNTER — OFFICE VISIT (OUTPATIENT)
Dept: OBGYN CLINIC | Facility: CLINIC | Age: 38
End: 2018-01-10

## 2018-01-10 VITALS — DIASTOLIC BLOOD PRESSURE: 70 MMHG | BODY MASS INDEX: 22 KG/M2 | SYSTOLIC BLOOD PRESSURE: 118 MMHG | WEIGHT: 134 LBS

## 2018-01-10 DIAGNOSIS — Z34.92 ENCOUNTER FOR SUPERVISION OF NORMAL PREGNANCY IN SECOND TRIMESTER, UNSPECIFIED GRAVIDITY: ICD-10-CM

## 2018-01-10 DIAGNOSIS — O09.529 ANTEPARTUM MULTIGRAVIDA OF ADVANCED MATERNAL AGE: Primary | ICD-10-CM

## 2018-01-10 NOTE — PROGRESS NOTES
TORSTEN.   Doing well. No complaints. Denies abdominal/pelvic pain or vaginal bleeding.    Rh positive   Genetic screening cffDNA negative   Recommend Anatomy scan 18-20 wks with MFM, order provided   Prenatal labs reviewed at previous visit   May use Blayne Organ

## 2018-01-30 NOTE — PROGRESS NOTES
Outpatient Maternal-Fetal Medicine Consultation    Dear Dr. Radha Hudson,    Thank you for requesting ultrasound evaluation and maternal fetal medicine consultation on your patient Juan Carlos Jimenes.   As you are aware she is a 42year old female with a Single answered. Please see the imaging tab for the detailed report. Indication: Maternal age (42 years). ____________________________________________________________________________  History: Age: 42 years. Maternal age at [facility]: 42 years.  : 2 Para: established EDC. No gross ultrasound evidence of structural abnormalities are seen today. No minor markers for aneuploidy are seen. The limitations of ultrasound were discussed.   The patient understands that ultrasound cannot rule out all structural and c trimester ultrasound assessment for fetal growth (at 32 weeks). In addition, weekly NST's (initiating at 36 weeks gestation for women 35-39 years and at 28 weeks gestation for women 40 years and older) are also advised.  Routine obstetric care is more than her risk of having a  delivery, only 14 additional cesareans would need to be performed to avert one unexplained fetal death.   Hence, weekly NST's are advised for women of advanced maternal age; testing should be initiated at 42 weeks for women 28- false positive rates. The prenatal record indicates a prior low risk cfDNA screen. We reviewed the normal level II ultrasound and she declined invasive prenatal genetic diagnostic testing.     H/o SGA -   We discussed her prior small for gestational age

## 2018-01-31 ENCOUNTER — OFFICE VISIT (OUTPATIENT)
Dept: PERINATAL CARE | Facility: HOSPITAL | Age: 38
End: 2018-01-31
Attending: OBSTETRICS & GYNECOLOGY
Payer: COMMERCIAL

## 2018-01-31 VITALS
DIASTOLIC BLOOD PRESSURE: 59 MMHG | HEART RATE: 89 BPM | WEIGHT: 136 LBS | SYSTOLIC BLOOD PRESSURE: 135 MMHG | BODY MASS INDEX: 23 KG/M2

## 2018-01-31 DIAGNOSIS — O09.529 ANTEPARTUM MULTIGRAVIDA OF ADVANCED MATERNAL AGE: ICD-10-CM

## 2018-01-31 DIAGNOSIS — Z87.59 HISTORY OF PRIOR PREGNANCY WITH SGA NEWBORN: ICD-10-CM

## 2018-01-31 PROCEDURE — 99243 OFF/OP CNSLTJ NEW/EST LOW 30: CPT | Performed by: OBSTETRICS & GYNECOLOGY

## 2018-01-31 PROCEDURE — 76811 OB US DETAILED SNGL FETUS: CPT | Performed by: OBSTETRICS & GYNECOLOGY

## 2018-02-07 ENCOUNTER — OFFICE VISIT (OUTPATIENT)
Dept: OBGYN CLINIC | Facility: CLINIC | Age: 38
End: 2018-02-07

## 2018-02-07 VITALS
WEIGHT: 138 LBS | SYSTOLIC BLOOD PRESSURE: 120 MMHG | HEIGHT: 64.75 IN | DIASTOLIC BLOOD PRESSURE: 64 MMHG | BODY MASS INDEX: 23.27 KG/M2

## 2018-02-07 DIAGNOSIS — O09.522 AMA (ADVANCED MATERNAL AGE) MULTIGRAVIDA 35+, SECOND TRIMESTER: Primary | ICD-10-CM

## 2018-02-07 NOTE — PROGRESS NOTES
TORSTEN  Doing well  RH pos  S/P Anatomy scan nl Level 2, growth scan at 30 amd 36 wks. Baby aspirin recommended.    1 hr glucose (24-28wks)  TDAP recommended during pregnancy and instructions given  RTC q4wks

## 2018-03-06 ENCOUNTER — OFFICE VISIT (OUTPATIENT)
Dept: OBGYN CLINIC | Facility: CLINIC | Age: 38
End: 2018-03-06

## 2018-03-06 ENCOUNTER — LAB ENCOUNTER (OUTPATIENT)
Dept: LAB | Age: 38
End: 2018-03-06
Attending: OBSTETRICS & GYNECOLOGY
Payer: COMMERCIAL

## 2018-03-06 VITALS — DIASTOLIC BLOOD PRESSURE: 68 MMHG | BODY MASS INDEX: 23 KG/M2 | SYSTOLIC BLOOD PRESSURE: 118 MMHG | WEIGHT: 139 LBS

## 2018-03-06 DIAGNOSIS — O09.522 AMA (ADVANCED MATERNAL AGE) MULTIGRAVIDA 35+, SECOND TRIMESTER: ICD-10-CM

## 2018-03-06 DIAGNOSIS — Z3A.25 25 WEEKS GESTATION OF PREGNANCY: Primary | ICD-10-CM

## 2018-03-06 LAB
BASOPHILS # BLD AUTO: 0.02 X10(3) UL (ref 0–0.1)
BASOPHILS NFR BLD AUTO: 0.3 %
EOSINOPHIL # BLD AUTO: 0.04 X10(3) UL (ref 0–0.3)
EOSINOPHIL NFR BLD AUTO: 0.5 %
ERYTHROCYTE [DISTWIDTH] IN BLOOD BY AUTOMATED COUNT: 13.3 % (ref 11.5–16)
GLUCOSE 1H P GLC SERPL-MCNC: 149 MG/DL
HCT VFR BLD AUTO: 34.8 % (ref 34–50)
HGB BLD-MCNC: 11.4 G/DL (ref 12–16)
IMMATURE GRANULOCYTE COUNT: 0.02 X10(3) UL (ref 0–1)
IMMATURE GRANULOCYTE RATIO %: 0.3 %
LYMPHOCYTES # BLD AUTO: 1.35 X10(3) UL (ref 0.9–4)
LYMPHOCYTES NFR BLD AUTO: 17.6 %
MCH RBC QN AUTO: 30.9 PG (ref 27–33.2)
MCHC RBC AUTO-ENTMCNC: 32.8 G/DL (ref 31–37)
MCV RBC AUTO: 94.3 FL (ref 81–100)
MONOCYTES # BLD AUTO: 0.48 X10(3) UL (ref 0.1–1)
MONOCYTES NFR BLD AUTO: 6.3 %
NEUTROPHIL ABS PRELIM: 5.74 X10 (3) UL (ref 1.3–6.7)
NEUTROPHILS # BLD AUTO: 5.74 X10(3) UL (ref 1.3–6.7)
NEUTROPHILS NFR BLD AUTO: 75 %
PLATELET # BLD AUTO: 258 10(3)UL (ref 150–450)
RBC # BLD AUTO: 3.69 X10(6)UL (ref 3.8–5.1)
RED CELL DISTRIBUTION WIDTH-SD: 46 FL (ref 35.1–46.3)
WBC # BLD AUTO: 7.7 X10(3) UL (ref 4–13)

## 2018-03-06 PROCEDURE — 85025 COMPLETE CBC W/AUTO DIFF WBC: CPT | Performed by: OBSTETRICS & GYNECOLOGY

## 2018-03-06 PROCEDURE — 36415 COLL VENOUS BLD VENIPUNCTURE: CPT | Performed by: OBSTETRICS & GYNECOLOGY

## 2018-03-06 PROCEDURE — 82950 GLUCOSE TEST: CPT | Performed by: OBSTETRICS & GYNECOLOGY

## 2018-03-06 NOTE — PROGRESS NOTES
TORSTEN  Doing well  RH +  S/P Anatomy scan normal  1 hr glucose (24-28wks)today  TDAP recommended during pregnancy and instructions given  RTC 2 wks

## 2018-03-07 DIAGNOSIS — R73.09 IMPAIRED GLUCOSE TOLERANCE TEST: Primary | ICD-10-CM

## 2018-03-07 DIAGNOSIS — O99.810 ABNORMAL MATERNAL GLUCOSE TOLERANCE, ANTEPARTUM: Primary | ICD-10-CM

## 2018-03-14 ENCOUNTER — LAB ENCOUNTER (OUTPATIENT)
Dept: LAB | Age: 38
End: 2018-03-14
Attending: OBSTETRICS & GYNECOLOGY
Payer: COMMERCIAL

## 2018-03-14 DIAGNOSIS — R73.09 IMPAIRED GLUCOSE TOLERANCE TEST: ICD-10-CM

## 2018-03-14 LAB
1 HR GLUCOSE GESTATIONAL: 77 MG/DL
2 HR GLUCOSE GESTATIONAL: 102 MG/DL
3 HR GLUCOSE GESTATIONAL: 91 MG/DL
FASTING GLUCOSE GESTATIONAL: 59 MG/DL

## 2018-03-14 PROCEDURE — 82951 GLUCOSE TOLERANCE TEST (GTT): CPT | Performed by: NURSE PRACTITIONER

## 2018-03-14 PROCEDURE — 82952 GTT-ADDED SAMPLES: CPT | Performed by: NURSE PRACTITIONER

## 2018-03-14 PROCEDURE — 36415 COLL VENOUS BLD VENIPUNCTURE: CPT | Performed by: NURSE PRACTITIONER

## 2018-03-20 ENCOUNTER — OFFICE VISIT (OUTPATIENT)
Dept: OBGYN CLINIC | Facility: CLINIC | Age: 38
End: 2018-03-20

## 2018-03-20 VITALS — DIASTOLIC BLOOD PRESSURE: 68 MMHG | WEIGHT: 139.63 LBS | SYSTOLIC BLOOD PRESSURE: 110 MMHG | BODY MASS INDEX: 23 KG/M2

## 2018-03-20 DIAGNOSIS — O09.522 AMA (ADVANCED MATERNAL AGE) MULTIGRAVIDA 35+, SECOND TRIMESTER: Primary | ICD-10-CM

## 2018-03-20 DIAGNOSIS — Z34.92 ENCOUNTER FOR SUPERVISION OF NORMAL PREGNANCY IN SECOND TRIMESTER, UNSPECIFIED GRAVIDITY: ICD-10-CM

## 2018-04-03 ENCOUNTER — APPOINTMENT (OUTPATIENT)
Dept: OBGYN CLINIC | Facility: CLINIC | Age: 38
End: 2018-04-03

## 2018-04-03 ENCOUNTER — OFFICE VISIT (OUTPATIENT)
Dept: OBGYN CLINIC | Facility: CLINIC | Age: 38
End: 2018-04-03

## 2018-04-03 VITALS
SYSTOLIC BLOOD PRESSURE: 112 MMHG | BODY MASS INDEX: 23.61 KG/M2 | DIASTOLIC BLOOD PRESSURE: 66 MMHG | HEART RATE: 82 BPM | HEIGHT: 64.75 IN | WEIGHT: 140 LBS

## 2018-04-03 DIAGNOSIS — Z23 NEED FOR TDAP VACCINATION: ICD-10-CM

## 2018-04-03 DIAGNOSIS — Z34.83 ENCOUNTER FOR SUPERVISION OF OTHER NORMAL PREGNANCY IN THIRD TRIMESTER: ICD-10-CM

## 2018-04-03 DIAGNOSIS — Z87.59 HISTORY OF PRIOR PREGNANCY WITH SGA NEWBORN: ICD-10-CM

## 2018-04-03 DIAGNOSIS — O09.529 ANTEPARTUM MULTIGRAVIDA OF ADVANCED MATERNAL AGE: Primary | ICD-10-CM

## 2018-04-03 PROBLEM — Z34.93 ENCOUNTER FOR SUPERVISION OF NORMAL PREGNANCY IN THIRD TRIMESTER: Status: ACTIVE | Noted: 2017-02-14

## 2018-04-03 PROBLEM — Z34.93 ENCOUNTER FOR SUPERVISION OF NORMAL PREGNANCY IN THIRD TRIMESTER (HCC): Status: ACTIVE | Noted: 2017-02-14

## 2018-04-03 PROCEDURE — 90471 IMMUNIZATION ADMIN: CPT | Performed by: OBSTETRICS & GYNECOLOGY

## 2018-04-03 PROCEDURE — 90715 TDAP VACCINE 7 YRS/> IM: CPT | Performed by: OBSTETRICS & GYNECOLOGY

## 2018-04-03 PROCEDURE — 76816 OB US FOLLOW-UP PER FETUS: CPT | Performed by: OBSTETRICS & GYNECOLOGY

## 2018-04-03 NOTE — PROGRESS NOTES
TORSTEN  Doing well, +FM  Denies LOF/VB/uctx  Rh positive, TDAP received  AMA: s/p MFM consult with Level 2 Us, growth scan at 30 and 36 wks and weekly NST at 36 wks. Continue daily low dose ASA until 36-37 weeks.    Hx of SGA, growth scan 30 and 36 weeks    RT

## 2018-04-18 ENCOUNTER — APPOINTMENT (OUTPATIENT)
Dept: LAB | Age: 38
End: 2018-04-18
Attending: FAMILY MEDICINE
Payer: COMMERCIAL

## 2018-04-18 ENCOUNTER — OFFICE VISIT (OUTPATIENT)
Dept: OBGYN CLINIC | Facility: CLINIC | Age: 38
End: 2018-04-18

## 2018-04-18 VITALS
SYSTOLIC BLOOD PRESSURE: 102 MMHG | HEIGHT: 64.75 IN | BODY MASS INDEX: 23.95 KG/M2 | DIASTOLIC BLOOD PRESSURE: 70 MMHG | WEIGHT: 142 LBS

## 2018-04-18 DIAGNOSIS — Z87.59 HISTORY OF PRIOR PREGNANCY WITH SGA NEWBORN: ICD-10-CM

## 2018-04-18 DIAGNOSIS — O09.523 AMA (ADVANCED MATERNAL AGE) MULTIGRAVIDA 35+, THIRD TRIMESTER: Primary | ICD-10-CM

## 2018-04-18 DIAGNOSIS — Z34.92 ENCOUNTER FOR SUPERVISION OF NORMAL PREGNANCY IN SECOND TRIMESTER, UNSPECIFIED GRAVIDITY: ICD-10-CM

## 2018-04-18 PROCEDURE — 36415 COLL VENOUS BLD VENIPUNCTURE: CPT | Performed by: OBSTETRICS & GYNECOLOGY

## 2018-04-18 PROCEDURE — 87389 HIV-1 AG W/HIV-1&-2 AB AG IA: CPT | Performed by: OBSTETRICS & GYNECOLOGY

## 2018-04-18 NOTE — PROGRESS NOTES
TORSTEN  Doing well, +FM  Denies VB/LOF/uctx  Growth scan 30 (normal growth on 4/3) and 36 wks, nst at 36 weeks.  h/o sga  RTC in 2 wks  Fetal movement instructions given  Cont aspirin until 36 weeks  Will go for third trimester HIV today

## 2018-05-02 ENCOUNTER — OFFICE VISIT (OUTPATIENT)
Dept: OBGYN CLINIC | Facility: CLINIC | Age: 38
End: 2018-05-02

## 2018-05-02 VITALS — SYSTOLIC BLOOD PRESSURE: 102 MMHG | WEIGHT: 143 LBS | BODY MASS INDEX: 24 KG/M2 | DIASTOLIC BLOOD PRESSURE: 64 MMHG

## 2018-05-02 DIAGNOSIS — O09.523 AMA (ADVANCED MATERNAL AGE) MULTIGRAVIDA 35+, THIRD TRIMESTER: Primary | ICD-10-CM

## 2018-05-02 DIAGNOSIS — Z87.59 HISTORY OF PRIOR PREGNANCY WITH SGA NEWBORN: ICD-10-CM

## 2018-05-14 ENCOUNTER — APPOINTMENT (OUTPATIENT)
Dept: OBGYN CLINIC | Facility: CLINIC | Age: 38
End: 2018-05-14

## 2018-05-14 ENCOUNTER — OFFICE VISIT (OUTPATIENT)
Dept: OBGYN CLINIC | Facility: CLINIC | Age: 38
End: 2018-05-14

## 2018-05-14 VITALS — DIASTOLIC BLOOD PRESSURE: 60 MMHG | WEIGHT: 145 LBS | SYSTOLIC BLOOD PRESSURE: 104 MMHG | BODY MASS INDEX: 24 KG/M2

## 2018-05-14 DIAGNOSIS — Z36.89 ENCOUNTER FOR ULTRASOUND TO CHECK FETAL GROWTH: ICD-10-CM

## 2018-05-14 DIAGNOSIS — Z87.59 HISTORY OF PRIOR PREGNANCY WITH SGA NEWBORN: ICD-10-CM

## 2018-05-14 DIAGNOSIS — Z36.85 ANTENATAL SCREENING FOR STREPTOCOCCUS B: ICD-10-CM

## 2018-05-14 DIAGNOSIS — Z34.83 ENCOUNTER FOR SUPERVISION OF OTHER NORMAL PREGNANCY IN THIRD TRIMESTER: Primary | ICD-10-CM

## 2018-05-14 DIAGNOSIS — O09.523 AMA (ADVANCED MATERNAL AGE) MULTIGRAVIDA 35+, THIRD TRIMESTER: ICD-10-CM

## 2018-05-14 PROCEDURE — 87653 STREP B DNA AMP PROBE: CPT | Performed by: OBSTETRICS & GYNECOLOGY

## 2018-05-14 PROCEDURE — 76816 OB US FOLLOW-UP PER FETUS: CPT | Performed by: OBSTETRICS & GYNECOLOGY

## 2018-05-14 PROCEDURE — 87081 CULTURE SCREEN ONLY: CPT | Performed by: OBSTETRICS & GYNECOLOGY

## 2018-05-14 NOTE — PATIENT INSTRUCTIONS
[facility] OB/[facility] Labor Instructions  How do I know if it’s true labor? • One of the most important aspects of any pregnancy is being able to recognize the onset of labor.   Unfortunately, on occasion it can be difficult or confusing, especially if you have had o always) in the beginning. They are cramp-like in character and feel similar to menstrual cramps. After a while, they become more regular, and they seem to last a little longer, and feel a little sharper.   These symptoms are very important-more important- (). What will happen when I get to the hospital?  ? When you arrive at the hospital, you will be admitted and examined.    There are a few factors that will determine if you will be allowed to be up out of bed or if you would need to stay in b like toothbrush, shampoo, hairbrush and etc.   • You can bring your favorite pillow, but please put a colored pillow case on it so it doesn’t get mixed up with hospital pillows.     How long will I stay in the hospital?  The date you leave the hospital may at approximately 9:00 a.m. Count ten separate times that the baby moves. A movement can be either a kick, a swish, a turn or a flip of the baby inside. Keep track mentally until the tenth time that the baby moves.   Look at the clock when the tenth t week 41     week 42        M T W T F S S M T W T F S S M T W T F S S   6a                        7a                        8a                        9a                        10a                        11a                        12p

## 2018-05-14 NOTE — PROGRESS NOTES
Here for third trimester growth ultrasound. MEGAN BERNABE 33w 4d giving ultrasound RUSS of June 28, 2018. Normal interval growth within S. D.in third trimester. Anterior placenta without previa. AFV normal at 18.3 cm. Anatomical survery not repeated.

## 2018-05-14 NOTE — PROGRESS NOTES
No issues since last seen. No contractions. Good FM. Normal growth ultrasound and BALWINDER. GBS screen done. [address] and ROD given. Start weekly NST's next time in one week.

## 2018-05-21 ENCOUNTER — TELEPHONE (OUTPATIENT)
Dept: OBGYN CLINIC | Facility: CLINIC | Age: 38
End: 2018-05-21

## 2018-05-21 NOTE — TELEPHONE ENCOUNTER
G2/P 1 GA 36w3d patient complaining of one episode of vaginal spotting after returning from the pediatrician office with her daughter. Patient is unsure of the color of the spotting due to the color of her underwear.     Last OV: 5/14/2018   Pregnancy Comp

## 2018-05-22 ENCOUNTER — OFFICE VISIT (OUTPATIENT)
Dept: OBGYN CLINIC | Facility: CLINIC | Age: 38
End: 2018-05-22

## 2018-05-22 ENCOUNTER — APPOINTMENT (OUTPATIENT)
Dept: OBGYN CLINIC | Facility: CLINIC | Age: 38
End: 2018-05-22

## 2018-05-22 VITALS — WEIGHT: 146 LBS | BODY MASS INDEX: 24 KG/M2 | SYSTOLIC BLOOD PRESSURE: 112 MMHG | DIASTOLIC BLOOD PRESSURE: 70 MMHG

## 2018-05-22 DIAGNOSIS — O09.523 AMA (ADVANCED MATERNAL AGE) MULTIGRAVIDA 35+, THIRD TRIMESTER: ICD-10-CM

## 2018-05-22 DIAGNOSIS — Z34.83 ENCOUNTER FOR SUPERVISION OF OTHER NORMAL PREGNANCY IN THIRD TRIMESTER: Primary | ICD-10-CM

## 2018-05-22 PROCEDURE — 59025 FETAL NON-STRESS TEST: CPT | Performed by: NURSE PRACTITIONER

## 2018-05-22 NOTE — PROGRESS NOTES
TORSTEN  Doing well, +FM  Denies VB/LOF/uctx  Mode of delivery:  anticipated  Labor precautions discussed  GBS was negative  RTC 1 week with NST  NST reactive

## 2018-05-29 ENCOUNTER — OFFICE VISIT (OUTPATIENT)
Dept: OBGYN CLINIC | Facility: CLINIC | Age: 38
End: 2018-05-29

## 2018-05-29 ENCOUNTER — APPOINTMENT (OUTPATIENT)
Dept: OBGYN CLINIC | Facility: CLINIC | Age: 38
End: 2018-05-29

## 2018-05-29 VITALS — BODY MASS INDEX: 25 KG/M2 | WEIGHT: 147 LBS | DIASTOLIC BLOOD PRESSURE: 64 MMHG | SYSTOLIC BLOOD PRESSURE: 106 MMHG

## 2018-05-29 DIAGNOSIS — O09.523 AMA (ADVANCED MATERNAL AGE) MULTIGRAVIDA 35+, THIRD TRIMESTER: ICD-10-CM

## 2018-05-29 DIAGNOSIS — Z34.83 ENCOUNTER FOR SUPERVISION OF OTHER NORMAL PREGNANCY IN THIRD TRIMESTER: Primary | ICD-10-CM

## 2018-05-29 PROCEDURE — 59025 FETAL NON-STRESS TEST: CPT | Performed by: OBSTETRICS & GYNECOLOGY

## 2018-05-29 NOTE — PROGRESS NOTES
Some vaginal spotting one week ago for one day. Scattered contractions, nothing regular. Good FM. NST reactive. Cervix firm and posterior. Barely 1 cm (tight FT). See in one week and NST.

## 2018-06-05 ENCOUNTER — OFFICE VISIT (OUTPATIENT)
Dept: OBGYN CLINIC | Facility: CLINIC | Age: 38
End: 2018-06-05

## 2018-06-05 ENCOUNTER — APPOINTMENT (OUTPATIENT)
Dept: OBGYN CLINIC | Facility: CLINIC | Age: 38
End: 2018-06-05

## 2018-06-05 VITALS
DIASTOLIC BLOOD PRESSURE: 80 MMHG | HEIGHT: 64.75 IN | BODY MASS INDEX: 24.79 KG/M2 | SYSTOLIC BLOOD PRESSURE: 120 MMHG | WEIGHT: 147 LBS

## 2018-06-05 DIAGNOSIS — Z34.83 ENCOUNTER FOR SUPERVISION OF OTHER NORMAL PREGNANCY IN THIRD TRIMESTER: Primary | ICD-10-CM

## 2018-06-05 DIAGNOSIS — O09.523 AMA (ADVANCED MATERNAL AGE) MULTIGRAVIDA 35+, THIRD TRIMESTER: ICD-10-CM

## 2018-06-05 PROCEDURE — 59025 FETAL NON-STRESS TEST: CPT | Performed by: NURSE PRACTITIONER

## 2018-06-05 NOTE — PROGRESS NOTES
TORSTEN  Doing well, +FM  Denies VB/LOF/uctx  Mode of delivery:  anticipated  Labor precautions discussed  SVE deferred  RTC 1 week with NST  NST reactive

## 2018-06-12 ENCOUNTER — TELEPHONE (OUTPATIENT)
Dept: OBGYN CLINIC | Facility: CLINIC | Age: 38
End: 2018-06-12

## 2018-06-12 ENCOUNTER — APPOINTMENT (OUTPATIENT)
Dept: OBGYN CLINIC | Facility: CLINIC | Age: 38
End: 2018-06-12

## 2018-06-12 ENCOUNTER — OFFICE VISIT (OUTPATIENT)
Dept: OBGYN CLINIC | Facility: CLINIC | Age: 38
End: 2018-06-12

## 2018-06-12 VITALS
WEIGHT: 146 LBS | SYSTOLIC BLOOD PRESSURE: 118 MMHG | DIASTOLIC BLOOD PRESSURE: 62 MMHG | BODY MASS INDEX: 24.62 KG/M2 | HEIGHT: 64.75 IN

## 2018-06-12 DIAGNOSIS — O09.523 AMA (ADVANCED MATERNAL AGE) MULTIGRAVIDA 35+, THIRD TRIMESTER: ICD-10-CM

## 2018-06-12 DIAGNOSIS — Z87.59 HISTORY OF PRIOR PREGNANCY WITH SGA NEWBORN: ICD-10-CM

## 2018-06-12 DIAGNOSIS — Z34.83 ENCOUNTER FOR SUPERVISION OF OTHER NORMAL PREGNANCY IN THIRD TRIMESTER: Primary | ICD-10-CM

## 2018-06-12 PROCEDURE — 59025 FETAL NON-STRESS TEST: CPT | Performed by: OBSTETRICS & GYNECOLOGY

## 2018-06-12 NOTE — PROGRESS NOTES
TORSTEN  Doing well, +FM  Denies LOF/VB/uctx  Mode of delivery:   anticipated  SVE /-3   GBS negative  AMA: s/p MFM consult with Level 2 Us, growth scan at 30 and 36 wks. Continue weekly NST.    Hx of SGA, s/p growth scan 30 and 36 weeks  Elevated BP x 1

## 2018-06-12 NOTE — TELEPHONE ENCOUNTER
Received IOL scheduling form from Dr. Herminia Nixon. She has scheduled patient for 6/19/18 at 2000 for cervidil. Form faxed to labor and delivery. Added to calendar. Copy to file in [place].

## 2018-06-15 ENCOUNTER — TELEPHONE (OUTPATIENT)
Dept: OBGYN CLINIC | Facility: CLINIC | Age: 38
End: 2018-06-15

## 2018-06-15 ENCOUNTER — HOSPITAL ENCOUNTER (INPATIENT)
Facility: HOSPITAL | Age: 38
LOS: 3 days | Discharge: HOME | End: 2018-06-18
Attending: OBSTETRICS & GYNECOLOGY | Admitting: OBSTETRICS & GYNECOLOGY
Payer: COMMERCIAL

## 2018-06-15 PROBLEM — Z34.90 PREGNANCY: Status: ACTIVE | Noted: 2018-06-15

## 2018-06-15 PROBLEM — Z34.90 PREGNANCY (HCC): Status: ACTIVE | Noted: 2018-06-15

## 2018-06-16 PROCEDURE — 10907ZC DRAINAGE OF AMNIOTIC FLUID, THERAPEUTIC FROM PRODUCTS OF CONCEPTION, VIA NATURAL OR ARTIFICIAL OPENING: ICD-10-PCS | Performed by: OBSTETRICS & GYNECOLOGY

## 2018-06-16 PROCEDURE — 0HQ9XZZ REPAIR PERINEUM SKIN, EXTERNAL APPROACH: ICD-10-PCS | Performed by: OBSTETRICS & GYNECOLOGY

## 2018-06-16 PROCEDURE — 59400 OBSTETRICAL CARE: CPT | Performed by: OBSTETRICS & GYNECOLOGY

## 2018-06-16 RX ORDER — ACETAMINOPHEN 325 MG/1
650 TABLET ORAL EVERY 6 HOURS PRN
Status: DISCONTINUED | OUTPATIENT
Start: 2018-06-16 | End: 2018-06-18

## 2018-06-16 RX ORDER — ZOLPIDEM TARTRATE 5 MG/1
5 TABLET ORAL NIGHTLY PRN
Status: DISCONTINUED | OUTPATIENT
Start: 2018-06-16 | End: 2018-06-18

## 2018-06-16 RX ORDER — DOCUSATE SODIUM 100 MG/1
100 CAPSULE, LIQUID FILLED ORAL
Status: DISCONTINUED | OUTPATIENT
Start: 2018-06-16 | End: 2018-06-18

## 2018-06-16 RX ORDER — EPHEDRINE SULFATE/0.9% NACL/PF 25 MG/5 ML
5 SYRINGE (ML) INTRAVENOUS AS NEEDED
Status: DISCONTINUED | OUTPATIENT
Start: 2018-06-16 | End: 2018-06-16

## 2018-06-16 RX ORDER — DEXTROSE, SODIUM CHLORIDE, SODIUM LACTATE, POTASSIUM CHLORIDE, AND CALCIUM CHLORIDE 5; .6; .31; .03; .02 G/100ML; G/100ML; G/100ML; G/100ML; G/100ML
INJECTION, SOLUTION INTRAVENOUS CONTINUOUS
Status: DISCONTINUED | OUTPATIENT
Start: 2018-06-16 | End: 2018-06-16 | Stop reason: HOSPADM

## 2018-06-16 RX ORDER — IBUPROFEN 600 MG/1
600 TABLET ORAL ONCE AS NEEDED
Status: DISCONTINUED | OUTPATIENT
Start: 2018-06-16 | End: 2018-06-16 | Stop reason: HOSPADM

## 2018-06-16 RX ORDER — BISACODYL 10 MG
10 SUPPOSITORY, RECTAL RECTAL ONCE AS NEEDED
Status: ACTIVE | OUTPATIENT
Start: 2018-06-16 | End: 2018-06-16

## 2018-06-16 RX ORDER — TRISODIUM CITRATE DIHYDRATE AND CITRIC ACID MONOHYDRATE 500; 334 MG/5ML; MG/5ML
30 SOLUTION ORAL AS NEEDED
Status: DISCONTINUED | OUTPATIENT
Start: 2018-06-16 | End: 2018-06-16 | Stop reason: HOSPADM

## 2018-06-16 RX ORDER — IBUPROFEN 600 MG/1
600 TABLET ORAL EVERY 6 HOURS
Status: DISCONTINUED | OUTPATIENT
Start: 2018-06-16 | End: 2018-06-18

## 2018-06-16 RX ORDER — TERBUTALINE SULFATE 1 MG/ML
0.25 INJECTION, SOLUTION SUBCUTANEOUS AS NEEDED
Status: DISCONTINUED | OUTPATIENT
Start: 2018-06-16 | End: 2018-06-16 | Stop reason: HOSPADM

## 2018-06-16 RX ORDER — SIMETHICONE 80 MG
80 TABLET,CHEWABLE ORAL 3 TIMES DAILY PRN
Status: DISCONTINUED | OUTPATIENT
Start: 2018-06-16 | End: 2018-06-18

## 2018-06-16 RX ORDER — NALBUPHINE HCL 10 MG/ML
2.5 AMPUL (ML) INJECTION
Status: DISCONTINUED | OUTPATIENT
Start: 2018-06-16 | End: 2018-06-16

## 2018-06-16 RX ORDER — SODIUM CHLORIDE, SODIUM LACTATE, POTASSIUM CHLORIDE, CALCIUM CHLORIDE 600; 310; 30; 20 MG/100ML; MG/100ML; MG/100ML; MG/100ML
INJECTION, SOLUTION INTRAVENOUS CONTINUOUS
Status: DISCONTINUED | OUTPATIENT
Start: 2018-06-16 | End: 2018-06-16 | Stop reason: HOSPADM

## 2018-06-16 NOTE — PROGRESS NOTES
Patient admitted via wheelchair to room. ID bands cross matched with L&D rn. Oriented to room. Safety precautions initiated. Bed in low position Call light in reach. D/C teachings initiated bedside.  Plan of care discussed Pt told to call for asst when greyson

## 2018-06-16 NOTE — PROGRESS NOTES
Pt transferred to Mother Baby room 1110 in stable condition. Report given to Chata Larkin RN. Infant transferred with mother in stable condition.

## 2018-06-16 NOTE — H&P
[facility]  Obstetrics and Gynecology  History & Physical    Tracy Puller Patient Status:  Inpatient    1980 MRN [id number]   Location [facility] Attending [place], [facility] Day 1 PCP Desiree Mayes history.   Past Social History: Past Surgical History:  1997: ORAL SURGERY      Comment: wisdom teeth/general anesthesia  Family History:   Family History   Problem Relation Age of Onset   • Other [OTHER] Mother      Hypothyroid   • Heart Disorder Maternal indicated   2. Fetal monitoring: CEFM  3. GBS: negative  4. Pain: may have epidural per patient request     Risks, benefits, alternatives and possible complications have been discussed in detail with the patient.   Pre-admission, admission, and post admissi

## 2018-06-16 NOTE — TELEPHONE ENCOUNTER
Returned patient's phone call. Patient reports UCx since AM and progressively stronger with shorter interval. Ucx every 2-5 minutes. Denies LOF or VB. +FM. The patient was advised to report to labor and delivery for further evaluation. Patient agreeable.

## 2018-06-16 NOTE — PROGRESS NOTES
Infant to mother baby unit 1st floor    Hugged and kissed in L&D . To nsy at placed under warmer. Vitals and assessment completed.  Tcb found to be 6.4 @ 2 hrs. tsb to be drawn

## 2018-06-16 NOTE — PROGRESS NOTES
Pt is a 42year old female admitted to TRG4/TRG4-A. Patient presents with:  Contractions     Pt is  40w1d intra-uterine pregnancy. History obtained, consents signed. Oriented to room, staff, and plan of care.   Updated Hamida Loco about the pt statu

## 2018-06-16 NOTE — L&D DELIVERY NOTE
Chitra, Girl  [[id number]]    Labor Events     labor?:  No   steroids?:  None  Antibiotics received during labor?:  No  Antibiotics (enter # doses in comment):  none  Rupture date/time:  2018 0312     Rupture type:  AROM  Fluid color Skin color Blue or pale Acrocyanotic Completely pink    Heart rate Absent <100 bpm >100 bpm    Reflex irritability No response Grimace Cry or active withdrawal    Muscle tone Limp Some flexion Active motion    Respiratory effort Absent Weak cry; hypovent position and prepped. She was encouraged to push. As the head was delivered the legs were lowered and the perineum was supported to decrease the risk of tearing. The shoulders rotated easily and delivery was completed without complication.   Bulb suction

## 2018-06-17 NOTE — PROGRESS NOTES
[facility]  Post-Partum Progress Note    Kwaku Mendoza Patient Status:  Inpatient    1980 MRN [id number]   [facility] 1SW-J Attending Yi Foreman MD   Hosp Day # 2 PCP Bhavani Mccabe MD     SUBJECTIVE:    Postpartum

## 2018-06-18 VITALS
RESPIRATION RATE: 17 BRPM | TEMPERATURE: 98 F | WEIGHT: 142 LBS | OXYGEN SATURATION: 99 % | BODY MASS INDEX: 22.82 KG/M2 | DIASTOLIC BLOOD PRESSURE: 78 MMHG | HEART RATE: 88 BPM | SYSTOLIC BLOOD PRESSURE: 122 MMHG | HEIGHT: 66 IN

## 2018-06-18 PROBLEM — Z34.90 PREGNANCY: Status: RESOLVED | Noted: 2018-06-15 | Resolved: 2018-06-18

## 2018-06-18 PROBLEM — Z34.93 ENCOUNTER FOR SUPERVISION OF NORMAL PREGNANCY IN THIRD TRIMESTER: Status: RESOLVED | Noted: 2017-02-14 | Resolved: 2018-06-18

## 2018-06-18 PROBLEM — Z34.93 ENCOUNTER FOR SUPERVISION OF NORMAL PREGNANCY IN THIRD TRIMESTER (HCC): Status: RESOLVED | Noted: 2017-02-14 | Resolved: 2018-06-18

## 2018-06-18 PROBLEM — Z34.90 PREGNANCY (HCC): Status: RESOLVED | Noted: 2018-06-15 | Resolved: 2018-06-18

## 2018-06-18 RX ORDER — IBUPROFEN 600 MG/1
600 TABLET ORAL EVERY 6 HOURS PRN
Qty: 40 TABLET | Refills: 0 | Status: SHIPPED | OUTPATIENT
Start: 2018-06-18 | End: 2018-07-17

## 2018-06-18 RX ORDER — PSEUDOEPHEDRINE HCL 30 MG
100 TABLET ORAL 2 TIMES DAILY PRN
Qty: 30 CAPSULE | Refills: 0 | Status: SHIPPED | OUTPATIENT
Start: 2018-06-18 | End: 2018-07-17

## 2018-06-18 NOTE — DISCHARGE SUMMARY
[facility]  Discharge Summary    Itzel Salazar Patient Status:  inpatient    1980 MRN [id number]   Location 1110/1110-A Attending [facility] Chip Nickerson Day # 3 PCP Hannah Valadez MD     Date of Admission: 6/15/2018    Date of Discharge:

## 2018-06-18 NOTE — PROGRESS NOTES
[facility]  Obstetrics and Gynecology    OB/GYN: Postpartum Progress Note     SUBJECTIVE:  Patient is a 42year old  female who is s/p . She is PPD# 2. Doing well. Denies fever, chills, N, V, chest pain and SOB.  Bleeding has been sta

## 2018-06-18 NOTE — PROGRESS NOTES
Instructions completed with no further questions. ID number on mom/baby tag checked for match. Discharged with baby in [facility].

## 2018-06-22 ENCOUNTER — TELEPHONE (OUTPATIENT)
Dept: OBGYN UNIT | Facility: HOSPITAL | Age: 38
End: 2018-06-22

## 2018-06-22 NOTE — PAYOR COMM NOTE
--------------  DISCHARGE REVIEW    Payor: Amarilis MCKEON/PEARL  Subscriber #:  [id number]  Authorization Number: [id number]    Admit date: 6/15/18  Admit time:  2336  Discharge Date: 6/18/2018 10:40 AM     Admitting Physician: Regina Mane MD  Attending on:06/18/18 0904   Medication Information                      docusate sodium 100 MG Oral Cap  Take 100 mg by mouth 2 (two) times daily as needed for constipation.              ibuprofen 600 MG Oral Tab  Take 1 tablet (600 mg total) by mouth every 6 (six)

## 2018-06-22 NOTE — PAYOR COMM NOTE
--------------  ADMISSION REVIEW     Payor: [address] [place] [address] POS/PEARL  Subscriber #:  [id number]  Authorization Number: [id number]    Admit date: 6/15/18  Admit time: 2336       Admitting Physician: Naomie Charles MD  Attending Physician:  No att. providers sophiau Anes PTL Lv   3 Current            2 SAB 09/08/17 7w0d          1 Term 02/15/17 38w1d 22:20 / 01:53 5 lb 6.2 oz F NORMAL SPONT EPI N HALEY      Complications: Variable decelerations        Past Medical History: History reviewed.  No pertinent past medical his 35+, third trimester     Encounter for supervision of normal pregnancy in third trimester     H/O miscarriage, currently pregnant     History of prior pregnancy with SGA      Pregnancy      1.  Labor:   - may initiate AOL with pitocin per protocol if

## 2018-06-28 ENCOUNTER — TELEPHONE (OUTPATIENT)
Dept: OBGYN CLINIC | Facility: CLINIC | Age: 38
End: 2018-06-28

## 2018-07-17 ENCOUNTER — POSTPARTUM (OUTPATIENT)
Dept: OBGYN CLINIC | Facility: CLINIC | Age: 38
End: 2018-07-17
Payer: COMMERCIAL

## 2018-07-17 VITALS
SYSTOLIC BLOOD PRESSURE: 124 MMHG | HEIGHT: 65.5 IN | WEIGHT: 129 LBS | BODY MASS INDEX: 21.23 KG/M2 | DIASTOLIC BLOOD PRESSURE: 72 MMHG

## 2018-07-17 PROBLEM — Z87.59 HISTORY OF PRIOR PREGNANCY WITH SGA NEWBORN: Status: RESOLVED | Noted: 2017-12-13 | Resolved: 2018-07-17

## 2018-07-17 PROBLEM — O09.299 H/O MISCARRIAGE, CURRENTLY PREGNANT: Status: RESOLVED | Noted: 2017-12-13 | Resolved: 2018-07-17

## 2018-07-17 PROBLEM — O09.299 H/O MISCARRIAGE, CURRENTLY PREGNANT (HCC): Status: RESOLVED | Noted: 2017-12-13 | Resolved: 2018-07-17

## 2018-07-17 NOTE — PROGRESS NOTES
[facility]  Obstetrics and Gynecology   Postpartum Progress Note    Subjective:     Cristian Corona is a 42year old  female who is s/p  on 18. Her pregnancy was complicated by hx of SGA and AMA. She reports doing well.  Baby contraception  - pt reports condom use for now  -  considering vasectomy   - advised to return to office if desires further discussion     All of the findings and plan were discussed with the patient.   She notes understanding and agrees with the [place]

## 2019-01-22 NOTE — Clinical Note
I had the pleasure of seeing Isidro Mcgee on 11/22/2022. Please see my attached note.   Natalie Brown MD FACS [facility]--Surgery conducted a detailed discussion... I had a detailed discussion with the patient and/or guardian regarding the historical points, exam findings, and any diagnostic results supporting the discharge/admit diagnosis.

## 2019-01-31 ENCOUNTER — OFFICE VISIT (OUTPATIENT)
Dept: OBGYN CLINIC | Facility: CLINIC | Age: 39
End: 2019-01-31
Payer: COMMERCIAL

## 2019-01-31 VITALS
BODY MASS INDEX: 21.83 KG/M2 | SYSTOLIC BLOOD PRESSURE: 122 MMHG | DIASTOLIC BLOOD PRESSURE: 76 MMHG | WEIGHT: 132.63 LBS | HEART RATE: 98 BPM | HEIGHT: 65.5 IN

## 2019-01-31 DIAGNOSIS — Z01.419 WELL FEMALE EXAM WITH ROUTINE GYNECOLOGICAL EXAM: Primary | ICD-10-CM

## 2019-01-31 DIAGNOSIS — Z12.31 VISIT FOR SCREENING MAMMOGRAM: ICD-10-CM

## 2019-01-31 DIAGNOSIS — Z12.4 SCREENING FOR MALIGNANT NEOPLASM OF THE CERVIX: ICD-10-CM

## 2019-01-31 PROCEDURE — 99396 PREV VISIT EST AGE 40-64: CPT | Performed by: OBSTETRICS & GYNECOLOGY

## 2019-01-31 PROCEDURE — 88175 CYTOPATH C/V AUTO FLUID REDO: CPT | Performed by: OBSTETRICS & GYNECOLOGY

## 2019-01-31 NOTE — PROGRESS NOTES
Annual  No C/O  Menses normal, using condoms  Girls are well, 16 months apart    ROS: No Cardiac, Respiratory, GI,  or Neurological symptoms.     PE:  GENERAL: well developed, well nourished, in no apparent distress alert oriented x 3  SKIN: no rashes, no

## 2019-06-18 ENCOUNTER — TELEPHONE (OUTPATIENT)
Dept: FAMILY MEDICINE CLINIC | Facility: CLINIC | Age: 39
End: 2019-06-18

## 2019-06-18 NOTE — TELEPHONE ENCOUNTER
Spoke to patient who will continue to have Dr. Lory Cole as her PCP, and will call to schedule an office visit when needed.

## 2022-08-02 ENCOUNTER — OFFICE VISIT (OUTPATIENT)
Dept: FAMILY MEDICINE CLINIC | Facility: CLINIC | Age: 42
End: 2022-08-02
Payer: COMMERCIAL

## 2022-08-02 VITALS
OXYGEN SATURATION: 98 % | SYSTOLIC BLOOD PRESSURE: 118 MMHG | RESPIRATION RATE: 16 BRPM | HEIGHT: 65 IN | BODY MASS INDEX: 21.16 KG/M2 | TEMPERATURE: 98 F | HEART RATE: 72 BPM | DIASTOLIC BLOOD PRESSURE: 74 MMHG | WEIGHT: 127 LBS

## 2022-08-02 DIAGNOSIS — Z00.00 WELL ADULT EXAM: Primary | ICD-10-CM

## 2022-08-02 DIAGNOSIS — Z12.31 ENCOUNTER FOR SCREENING MAMMOGRAM FOR MALIGNANT NEOPLASM OF BREAST: ICD-10-CM

## 2022-08-02 PROCEDURE — 3078F DIAST BP <80 MM HG: CPT | Performed by: FAMILY MEDICINE

## 2022-08-02 PROCEDURE — 3008F BODY MASS INDEX DOCD: CPT | Performed by: FAMILY MEDICINE

## 2022-08-02 PROCEDURE — 3074F SYST BP LT 130 MM HG: CPT | Performed by: FAMILY MEDICINE

## 2022-08-02 PROCEDURE — 99386 PREV VISIT NEW AGE 40-64: CPT | Performed by: FAMILY MEDICINE

## 2022-08-06 ENCOUNTER — LAB ENCOUNTER (OUTPATIENT)
Dept: LAB | Age: 42
End: 2022-08-06
Attending: FAMILY MEDICINE
Payer: COMMERCIAL

## 2022-08-06 DIAGNOSIS — Z00.00 WELL ADULT EXAM: ICD-10-CM

## 2022-08-06 LAB
ALBUMIN SERPL-MCNC: 3.8 G/DL (ref 3.4–5)
ALBUMIN/GLOB SERPL: 1.1 {RATIO} (ref 1–2)
ALP LIVER SERPL-CCNC: 49 U/L
ALT SERPL-CCNC: 14 U/L
ANION GAP SERPL CALC-SCNC: 5 MMOL/L (ref 0–18)
AST SERPL-CCNC: 11 U/L (ref 15–37)
BASOPHILS # BLD AUTO: 0.06 X10(3) UL (ref 0–0.2)
BASOPHILS NFR BLD AUTO: 1.2 %
BILIRUB SERPL-MCNC: 0.5 MG/DL (ref 0.1–2)
BUN BLD-MCNC: 22 MG/DL (ref 7–18)
CALCIUM BLD-MCNC: 8.9 MG/DL (ref 8.5–10.1)
CHLORIDE SERPL-SCNC: 108 MMOL/L (ref 98–112)
CHOLEST SERPL-MCNC: 152 MG/DL (ref ?–200)
CO2 SERPL-SCNC: 26 MMOL/L (ref 21–32)
CREAT BLD-MCNC: 0.76 MG/DL
EOSINOPHIL # BLD AUTO: 0.18 X10(3) UL (ref 0–0.7)
EOSINOPHIL NFR BLD AUTO: 3.6 %
ERYTHROCYTE [DISTWIDTH] IN BLOOD BY AUTOMATED COUNT: 13.6 %
FASTING PATIENT LIPID ANSWER: YES
FASTING STATUS PATIENT QL REPORTED: YES
GFR SERPLBLD BASED ON 1.73 SQ M-ARVRAT: 100 ML/MIN/1.73M2 (ref 60–?)
GLOBULIN PLAS-MCNC: 3.4 G/DL (ref 2.8–4.4)
GLUCOSE BLD-MCNC: 84 MG/DL (ref 70–99)
HCT VFR BLD AUTO: 38.9 %
HDLC SERPL-MCNC: 71 MG/DL (ref 40–59)
HGB BLD-MCNC: 12.4 G/DL
IMM GRANULOCYTES # BLD AUTO: 0.01 X10(3) UL (ref 0–1)
IMM GRANULOCYTES NFR BLD: 0.2 %
LDLC SERPL CALC-MCNC: 67 MG/DL (ref ?–100)
LYMPHOCYTES # BLD AUTO: 1.85 X10(3) UL (ref 1–4)
LYMPHOCYTES NFR BLD AUTO: 37.4 %
MCH RBC QN AUTO: 29.6 PG (ref 26–34)
MCHC RBC AUTO-ENTMCNC: 31.9 G/DL (ref 31–37)
MCV RBC AUTO: 92.8 FL
MONOCYTES # BLD AUTO: 0.53 X10(3) UL (ref 0.1–1)
MONOCYTES NFR BLD AUTO: 10.7 %
NEUTROPHILS # BLD AUTO: 2.31 X10 (3) UL (ref 1.5–7.7)
NEUTROPHILS # BLD AUTO: 2.31 X10(3) UL (ref 1.5–7.7)
NEUTROPHILS NFR BLD AUTO: 46.9 %
NONHDLC SERPL-MCNC: 81 MG/DL (ref ?–130)
OSMOLALITY SERPL CALC.SUM OF ELEC: 291 MOSM/KG (ref 275–295)
PLATELET # BLD AUTO: 245 10(3)UL (ref 150–450)
POTASSIUM SERPL-SCNC: 4 MMOL/L (ref 3.5–5.1)
PROT SERPL-MCNC: 7.2 G/DL (ref 6.4–8.2)
RBC # BLD AUTO: 4.19 X10(6)UL
SODIUM SERPL-SCNC: 139 MMOL/L (ref 136–145)
TRIGL SERPL-MCNC: 74 MG/DL (ref 30–149)
TSI SER-ACNC: 1.6 MIU/ML (ref 0.36–3.74)
VLDLC SERPL CALC-MCNC: 11 MG/DL (ref 0–30)
WBC # BLD AUTO: 4.9 X10(3) UL (ref 4–11)

## 2022-08-06 PROCEDURE — 80061 LIPID PANEL: CPT | Performed by: FAMILY MEDICINE

## 2022-08-06 PROCEDURE — 80050 GENERAL HEALTH PANEL: CPT | Performed by: FAMILY MEDICINE

## 2022-09-01 ENCOUNTER — MED REC SCAN ONLY (OUTPATIENT)
Dept: FAMILY MEDICINE CLINIC | Facility: CLINIC | Age: 42
End: 2022-09-01

## 2022-09-12 ENCOUNTER — HOSPITAL ENCOUNTER (OUTPATIENT)
Dept: MAMMOGRAPHY | Age: 42
Discharge: HOME | End: 2022-09-12
Attending: FAMILY MEDICINE
Payer: COMMERCIAL

## 2022-09-12 DIAGNOSIS — Z12.31 ENCOUNTER FOR SCREENING MAMMOGRAM FOR MALIGNANT NEOPLASM OF BREAST: ICD-10-CM

## 2022-09-12 PROCEDURE — 77063 BREAST TOMOSYNTHESIS BI: CPT | Performed by: FAMILY MEDICINE

## 2022-09-12 PROCEDURE — 77067 SCR MAMMO BI INCL CAD: CPT | Performed by: FAMILY MEDICINE

## 2022-09-23 ENCOUNTER — HOSPITAL ENCOUNTER (OUTPATIENT)
Dept: MAMMOGRAPHY | Age: 42
Discharge: HOME | End: 2022-09-23
Attending: FAMILY MEDICINE

## 2022-09-23 ENCOUNTER — HOSPITAL ENCOUNTER (OUTPATIENT)
Dept: ULTRASOUND IMAGING | Age: 42
Discharge: HOME | End: 2022-09-23
Attending: FAMILY MEDICINE

## 2022-09-23 DIAGNOSIS — R92.2 INCONCLUSIVE MAMMOGRAM: ICD-10-CM

## 2022-09-23 PROCEDURE — 77061 BREAST TOMOSYNTHESIS UNI: CPT | Performed by: FAMILY MEDICINE

## 2022-09-23 PROCEDURE — 76641 ULTRASOUND BREAST COMPLETE: CPT | Performed by: FAMILY MEDICINE

## 2022-09-23 PROCEDURE — 77065 DX MAMMO INCL CAD UNI: CPT | Performed by: FAMILY MEDICINE

## 2022-09-30 ENCOUNTER — HOSPITAL ENCOUNTER (OUTPATIENT)
Dept: MAMMOGRAPHY | Facility: HOSPITAL | Age: 42
Discharge: HOME | End: 2022-09-30
Attending: FAMILY MEDICINE
Payer: COMMERCIAL

## 2022-09-30 DIAGNOSIS — R92.1 BREAST CALCIFICATIONS: ICD-10-CM

## 2022-09-30 PROCEDURE — 19082 BX BREAST ADD LESION STRTCTC: CPT | Performed by: FAMILY MEDICINE

## 2022-09-30 PROCEDURE — 19499 UNLISTED PROCEDURE BREAST: CPT | Performed by: FAMILY MEDICINE

## 2022-09-30 PROCEDURE — 19081 BX BREAST 1ST LESION STRTCTC: CPT | Performed by: FAMILY MEDICINE

## 2022-09-30 PROCEDURE — 88305 TISSUE EXAM BY PATHOLOGIST: CPT | Performed by: FAMILY MEDICINE

## 2022-10-04 DIAGNOSIS — N60.99 ATYPICAL LOBULAR HYPERPLASIA (ALH) OF BREAST: ICD-10-CM

## 2022-10-04 DIAGNOSIS — N60.99 ATYPICAL DUCTAL HYPERPLASIA OF BREAST: Primary | ICD-10-CM

## 2022-10-17 ENCOUNTER — OFFICE VISIT (OUTPATIENT)
Facility: LOCATION | Age: 42
End: 2022-10-17
Payer: COMMERCIAL

## 2022-10-17 VITALS — HEART RATE: 81 BPM | TEMPERATURE: 97 F

## 2022-10-17 DIAGNOSIS — N60.99 ATYPICAL DUCTAL HYPERPLASIA OF BREAST: Primary | ICD-10-CM

## 2022-10-17 DIAGNOSIS — N60.99 ATYPICAL LOBULAR HYPERPLASIA (ALH) OF BREAST: ICD-10-CM

## 2022-10-30 ENCOUNTER — HOSPITAL ENCOUNTER (OUTPATIENT)
Dept: MRI IMAGING | Facility: HOSPITAL | Age: 42
End: 2022-10-30
Attending: SURGERY
Payer: COMMERCIAL

## 2022-10-30 ENCOUNTER — HOSPITAL ENCOUNTER (OUTPATIENT)
Dept: MRI IMAGING | Facility: HOSPITAL | Age: 42
Discharge: HOME | End: 2022-10-30
Attending: SURGERY
Payer: COMMERCIAL

## 2022-10-30 DIAGNOSIS — N60.99 ATYPICAL DUCTAL HYPERPLASIA OF BREAST: ICD-10-CM

## 2022-10-30 DIAGNOSIS — N60.99 ATYPICAL LOBULAR HYPERPLASIA (ALH) OF BREAST: ICD-10-CM

## 2022-10-30 PROCEDURE — 77049 MRI BREAST C-+ W/CAD BI: CPT | Performed by: SURGERY

## 2022-10-30 PROCEDURE — A9575 INJ GADOTERATE MEGLUMI 0.1ML: HCPCS

## 2022-10-30 RX ORDER — GADOTERATE MEGLUMINE 376.9 MG/ML
15 INJECTION INTRAVENOUS
Status: COMPLETED | OUTPATIENT
Start: 2022-10-30 | End: 2022-10-30

## 2022-10-30 RX ADMIN — GADOTERATE MEGLUMINE 15 ML: 376.9 INJECTION INTRAVENOUS at 14:30:00

## 2022-11-01 ENCOUNTER — TELEPHONE (OUTPATIENT)
Facility: LOCATION | Age: 42
End: 2022-11-01

## 2022-11-01 DIAGNOSIS — N60.99 ATYPICAL LOBULAR HYPERPLASIA (ALH) OF BREAST: ICD-10-CM

## 2022-11-01 DIAGNOSIS — N60.99 ATYPICAL DUCTAL HYPERPLASIA OF BREAST: Primary | ICD-10-CM

## 2022-11-01 DIAGNOSIS — R92.0 MICROCALCIFICATION OF RIGHT BREAST ON MAMMOGRAM: ICD-10-CM

## 2022-11-01 NOTE — TELEPHONE ENCOUNTER
I called the patient to discuss her MRI results. I was able to review the images with Dr. Simón Arias. There is increased enhancement at the 12 o'clock position, which correlates to a site of microcalcifications. We discussed surgical excision of this area versus stereotactic biopsy. I recommended proceeding with stereotactic biopsy so I do not have to commit her to excision of tissue at this point. My fear is poor cosmesis of the right breast if we have to take out 3 separate areas, at 12:00, at 2:00, and at 4:00. The patient was agreeable to proceed with a stereotactic right breast biopsy. I will notify her of the results of the biopsy.

## 2022-11-04 ENCOUNTER — TELEPHONE (OUTPATIENT)
Dept: MAMMOGRAPHY | Facility: HOSPITAL | Age: 42
End: 2022-11-04

## 2022-11-04 NOTE — TELEPHONE ENCOUNTER
This Breast Care RN phoned pt re right breast 1 site stereotactic biopsy recommendation by Dr. Bret Tillman for calcifications. Procedure reviewed and all questions answered. Emotional support given. Reviewed educational handouts. On the day of the biopsy, pt instructed to take Tylenol 1000mg PO, eat a light meal & bring or wear a sports bra. Post biopsy care also reviewed with pt to include NO lifting more than 5lbs, no exercising or housework (limit upper body movement) for 24-48 hrs post biopsy. Patient denies blood thinners, bleeding disorders, liver disease, and chemo. Pt verbalized understanding. Our breast center schedulers will be calling to schedule an appt that is convenient for pt.

## 2022-11-04 NOTE — TELEPHONE ENCOUNTER
Attempted to call patient re: breast biopsy recommendation screening and education. Message left for patient to call back.

## 2022-11-15 ENCOUNTER — HOSPITAL ENCOUNTER (OUTPATIENT)
Dept: MAMMOGRAPHY | Facility: HOSPITAL | Age: 42
Discharge: HOME | End: 2022-11-15
Attending: SURGERY
Payer: COMMERCIAL

## 2022-11-15 DIAGNOSIS — N60.99 ATYPICAL DUCTAL HYPERPLASIA OF BREAST: ICD-10-CM

## 2022-11-15 DIAGNOSIS — R92.0 MICROCALCIFICATION OF RIGHT BREAST ON MAMMOGRAM: ICD-10-CM

## 2022-11-15 DIAGNOSIS — N60.99 ATYPICAL LOBULAR HYPERPLASIA (ALH) OF BREAST: ICD-10-CM

## 2022-11-15 PROCEDURE — 88305 TISSUE EXAM BY PATHOLOGIST: CPT | Performed by: SURGERY

## 2022-11-15 PROCEDURE — 19081 BX BREAST 1ST LESION STRTCTC: CPT | Performed by: SURGERY

## 2022-11-15 PROCEDURE — 88342 IMHCHEM/IMCYTCHM 1ST ANTB: CPT | Performed by: SURGERY

## 2022-11-22 ENCOUNTER — OFFICE VISIT (OUTPATIENT)
Dept: HEMATOLOGY/ONCOLOGY | Facility: HOSPITAL | Age: 42
End: 2022-11-22
Attending: SURGERY
Payer: COMMERCIAL

## 2022-11-22 ENCOUNTER — NURSE NAVIGATOR ENCOUNTER (OUTPATIENT)
Dept: HEMATOLOGY/ONCOLOGY | Facility: HOSPITAL | Age: 42
End: 2022-11-22

## 2022-11-22 ENCOUNTER — OFFICE VISIT (OUTPATIENT)
Facility: LOCATION | Age: 42
End: 2022-11-22
Payer: COMMERCIAL

## 2022-11-22 VITALS
WEIGHT: 128 LBS | RESPIRATION RATE: 18 BRPM | TEMPERATURE: 98 F | BODY MASS INDEX: 21 KG/M2 | HEART RATE: 84 BPM | DIASTOLIC BLOOD PRESSURE: 85 MMHG | SYSTOLIC BLOOD PRESSURE: 136 MMHG | OXYGEN SATURATION: 100 %

## 2022-11-22 DIAGNOSIS — N60.99 ATYPICAL DUCTAL HYPERPLASIA OF BREAST: Primary | ICD-10-CM

## 2022-11-22 DIAGNOSIS — N60.99 ATYPICAL LOBULAR HYPERPLASIA (ALH) OF BREAST: ICD-10-CM

## 2022-11-22 DIAGNOSIS — R92.0 MICROCALCIFICATION OF RIGHT BREAST ON MAMMOGRAM: ICD-10-CM

## 2022-11-22 RX ORDER — COVID-19 MOLECULAR TEST ASSAY
KIT MISCELLANEOUS
COMMUNITY
Start: 2022-08-30 | End: 2022-11-22

## 2022-11-22 NOTE — PROGRESS NOTES
Dx: Atypical ductal hyperplasia of breast   Dx: Microcalcification of right breast on mammogram.     Patient presents to clinic for breast follow up. Stereotactic needle core biopsy, right breast 12:00 calcifications performed on 11/15/22. Breast tissue with focal atypical ductal hyperplasia (ADH), atypical lobular hyperplasia (ALH), and calcifications. Patient here to discuss pathology results and plan of care. Spouse Elijah Mills present at visit. Denies any biopsy site pain. No fever, chills, bleeding, or discharge. Steri-strips have a fallen off. Medication and allergies reviewed and updated.

## 2022-11-22 NOTE — PROGRESS NOTES
Called patient to schedule with both plastics and medical oncology. Requested appointment from plastics office, will contact patient with date and time when available. Patient will review the medical oncologists names and then call back with preference who she would like to see. Sent Send [employer] messages with their names as requested. Phone number provided to call back with any questions.

## 2022-12-01 ENCOUNTER — NURSE NAVIGATOR ENCOUNTER (OUTPATIENT)
Dept: HEMATOLOGY/ONCOLOGY | Facility: HOSPITAL | Age: 42
End: 2022-12-01

## 2022-12-01 NOTE — PROGRESS NOTES
Patient interested in scheduling for medical oncology due to [facility], pending personal schedule selected appointment with Dr. Aki Alegria for breast consult on 12/13 at 930am in [place]. She is aware of this appointment and also going to see Dr. Adams Ty at 1030am that same day. Will call back with any questions.

## 2022-12-13 ENCOUNTER — OFFICE VISIT (OUTPATIENT)
Dept: HEMATOLOGY/ONCOLOGY | Facility: HOSPITAL | Age: 42
End: 2022-12-13
Attending: SURGERY
Payer: COMMERCIAL

## 2022-12-13 ENCOUNTER — OFFICE VISIT (OUTPATIENT)
Dept: SURGERY | Facility: CLINIC | Age: 42
End: 2022-12-13
Payer: COMMERCIAL

## 2022-12-13 VITALS
DIASTOLIC BLOOD PRESSURE: 76 MMHG | OXYGEN SATURATION: 100 % | RESPIRATION RATE: 16 BRPM | BODY MASS INDEX: 21.16 KG/M2 | HEART RATE: 87 BPM | TEMPERATURE: 98 F | SYSTOLIC BLOOD PRESSURE: 135 MMHG | WEIGHT: 127 LBS | HEIGHT: 65 IN

## 2022-12-13 VITALS
TEMPERATURE: 99 F | HEIGHT: 65.35 IN | SYSTOLIC BLOOD PRESSURE: 158 MMHG | OXYGEN SATURATION: 99 % | RESPIRATION RATE: 16 BRPM | BODY MASS INDEX: 20.68 KG/M2 | DIASTOLIC BLOOD PRESSURE: 90 MMHG | WEIGHT: 125.63 LBS | HEART RATE: 90 BPM

## 2022-12-13 DIAGNOSIS — N60.99 ATYPICAL LOBULAR HYPERPLASIA (ALH) OF BREAST: Primary | ICD-10-CM

## 2022-12-13 DIAGNOSIS — N60.99 ATYPICAL DUCTAL HYPERPLASIA OF BREAST: Primary | ICD-10-CM

## 2022-12-13 DIAGNOSIS — N60.99 ATYPICAL LOBULAR HYPERPLASIA (ALH) OF BREAST: ICD-10-CM

## 2022-12-13 DIAGNOSIS — R92.0 MICROCALCIFICATION OF RIGHT BREAST ON MAMMOGRAM: ICD-10-CM

## 2022-12-13 PROCEDURE — 99243 OFF/OP CNSLTJ NEW/EST LOW 30: CPT | Performed by: SURGERY

## 2022-12-13 PROCEDURE — 3080F DIAST BP >= 90 MM HG: CPT | Performed by: SURGERY

## 2022-12-13 PROCEDURE — 3008F BODY MASS INDEX DOCD: CPT | Performed by: SURGERY

## 2022-12-13 PROCEDURE — 99245 OFF/OP CONSLTJ NEW/EST HI 55: CPT | Performed by: INTERNAL MEDICINE

## 2022-12-13 PROCEDURE — 3077F SYST BP >= 140 MM HG: CPT | Performed by: SURGERY

## 2022-12-13 NOTE — PROGRESS NOTES
Patient is here for consultation after routine mammogram showed suspicous findings. She has had biopsy and been seen by Dr. Parth Alonso. She sees Dr. Brissa Wallis after this consultation. Her general health is very good. She denies any fevers, cough or shortness of breath. She has no family history of cancer.      Education Record    Learner:  Patient and Spouse    Disease / Diagnosis: atypical hyperplasia    Barriers / Limitations:  None   Comments:    Method:  Discussion   Comments:    General Topics:  Side effects and symptom management   Comments:    Outcome:  Shows understanding   Comments:

## 2022-12-20 ENCOUNTER — TELEPHONE (OUTPATIENT)
Dept: HEMATOLOGY/ONCOLOGY | Facility: HOSPITAL | Age: 42
End: 2022-12-20

## 2023-01-10 ENCOUNTER — OFFICE VISIT (OUTPATIENT)
Dept: HEMATOLOGY/ONCOLOGY | Facility: HOSPITAL | Age: 43
End: 2023-01-10
Attending: SURGERY
Payer: COMMERCIAL

## 2023-01-10 ENCOUNTER — OFFICE VISIT (OUTPATIENT)
Facility: LOCATION | Age: 43
End: 2023-01-10
Payer: COMMERCIAL

## 2023-01-10 VITALS
SYSTOLIC BLOOD PRESSURE: 138 MMHG | DIASTOLIC BLOOD PRESSURE: 84 MMHG | WEIGHT: 130.5 LBS | BODY MASS INDEX: 21 KG/M2 | RESPIRATION RATE: 16 BRPM | HEART RATE: 80 BPM | OXYGEN SATURATION: 100 % | TEMPERATURE: 98 F

## 2023-01-10 DIAGNOSIS — R92.0 MICROCALCIFICATION OF RIGHT BREAST ON MAMMOGRAM: ICD-10-CM

## 2023-01-10 DIAGNOSIS — N60.99 ATYPICAL LOBULAR HYPERPLASIA (ALH) OF BREAST: ICD-10-CM

## 2023-01-10 DIAGNOSIS — N60.99 ATYPICAL DUCTAL HYPERPLASIA OF BREAST: Primary | ICD-10-CM

## 2023-01-10 PROCEDURE — 99213 OFFICE O/P EST LOW 20 MIN: CPT | Performed by: SURGERY

## 2023-01-10 PROCEDURE — 99211 OFF/OP EST MAY X REQ PHY/QHP: CPT

## 2023-01-10 NOTE — PROGRESS NOTES
Dx: Atypical ductal hyperplasia of breast.   Dx: Microcalcification of right breast on mammogram.     Patient presents to clinic for breast follow up. Here to discuss mastectomy has already consulted with Dr. Trini Page and Dr. Mike Jones on 12/13/22. Denies any new breast complaints. Spouse Markus Hetcor present at visit. Medication and allergies reviewed and updated.

## 2023-01-31 ENCOUNTER — OFFICE VISIT (OUTPATIENT)
Dept: SURGERY | Facility: CLINIC | Age: 43
End: 2023-01-31
Payer: COMMERCIAL

## 2023-01-31 DIAGNOSIS — N60.99 ATYPICAL DUCTAL HYPERPLASIA OF BREAST: Primary | ICD-10-CM

## 2023-01-31 PROCEDURE — 99212 OFFICE O/P EST SF 10 MIN: CPT | Performed by: SURGERY

## 2023-01-31 NOTE — PATIENT INSTRUCTIONS
Surgeon:         Dr. Graciela Jordan                                        Tel:         [phone]                                  Fax:        [phone]    Surgery/Procedure: Immediate bilateral breast reconstruction with tissue expander placement, acellular dermal matrix, possible direct to implant, 2.5 hours for Camille's portion, joint with  Marcha Mortimer, general anesthesia, pre op pec block, outpatient                Hospital:  [facility]: [address] [place] [address], [place], [address]           [phone]  [address]: [address], [facility]               [phone]    1. Someone will need to drive you to and from the hospital if your procedure is outpatient. 2.Do not drink alcohol or smoke 24 hours prior to your procedure. 3. Bring a picture ID and your insurance card. 4. You will be contacted by the hospital the day before to confirm the procedure time and location. 5. The hospital will also contact you approximately one week before surgery to schedule your COVID test.     6. Do not take any herbal supplements or blood thinners at least one week before your procedure/surgery. This includes NSAID's (aspirin, baby aspirin, Motrin, Ibuprofen, Aleve, Advil, Naproxen, etc), Plavix, fish oil, vitamin E, turmeric, CoQ10, or green tea supplements, etc. *TYLENOL or acetaminophen is ok to take*    7. PRE-OPERATIVE TESTING: History and physical with medical clearance is REQUIRED within 30 days of the surgery date and is mandatory per Dr. Bennett Turner. *If this is not done, your surgery will be postponed*  MEDICAL CLEARANCE WITH DR. Sampson Lower   CBC  CMP  EKG    8. Please inform us if you develop any Covid-19 like symptoms, test positive or have been exposed for Covid- 19 prior to surgery.      Consent obtained 01/31/2023  Photos taken on 01/31/2023

## 2023-02-01 DIAGNOSIS — N60.91 ATYPICAL DUCTAL HYPERPLASIA OF RIGHT BREAST: Primary | ICD-10-CM

## 2023-02-01 DIAGNOSIS — N60.99 ATYPICAL DUCTAL HYPERPLASIA OF BREAST: Primary | ICD-10-CM

## 2023-02-02 ENCOUNTER — NURSE NAVIGATOR ENCOUNTER (OUTPATIENT)
Dept: HEMATOLOGY/ONCOLOGY | Facility: HOSPITAL | Age: 43
End: 2023-02-02

## 2023-02-09 ENCOUNTER — OFFICE VISIT (OUTPATIENT)
Dept: OBGYN CLINIC | Facility: CLINIC | Age: 43
End: 2023-02-09
Payer: COMMERCIAL

## 2023-02-09 VITALS
SYSTOLIC BLOOD PRESSURE: 114 MMHG | HEIGHT: 63.5 IN | WEIGHT: 129.13 LBS | DIASTOLIC BLOOD PRESSURE: 72 MMHG | HEART RATE: 79 BPM | BODY MASS INDEX: 22.6 KG/M2

## 2023-02-09 DIAGNOSIS — Z12.4 CERVICAL CANCER SCREENING: ICD-10-CM

## 2023-02-09 DIAGNOSIS — Z01.419 WELL WOMAN EXAM WITH ROUTINE GYNECOLOGICAL EXAM: Primary | ICD-10-CM

## 2023-02-09 PROCEDURE — 3008F BODY MASS INDEX DOCD: CPT | Performed by: NURSE PRACTITIONER

## 2023-02-09 PROCEDURE — 3078F DIAST BP <80 MM HG: CPT | Performed by: NURSE PRACTITIONER

## 2023-02-09 PROCEDURE — 99386 PREV VISIT NEW AGE 40-64: CPT | Performed by: NURSE PRACTITIONER

## 2023-02-09 PROCEDURE — 3074F SYST BP LT 130 MM HG: CPT | Performed by: NURSE PRACTITIONER

## 2023-02-09 PROCEDURE — 87624 HPV HI-RISK TYP POOLED RSLT: CPT | Performed by: NURSE PRACTITIONER

## 2023-02-13 ENCOUNTER — TELEPHONE (OUTPATIENT)
Dept: MAMMOGRAPHY | Facility: HOSPITAL | Age: 43
End: 2023-02-13

## 2023-02-13 NOTE — TELEPHONE ENCOUNTER
Spoke with Veronica Ca regarding [place] Lymph Node mapping which will be done in nuclear medicine department before bilateral masectomy surgery scheduled for 3/23/23. Procedure explained and all questions answered. Pt verbalized understanding and had no further questions at this time.

## 2023-02-20 LAB — HPV I/H RISK 1 DNA SPEC QL NAA+PROBE: NEGATIVE

## 2023-02-27 ENCOUNTER — TELEPHONE (OUTPATIENT)
Dept: SURGERY | Facility: CLINIC | Age: 43
End: 2023-02-27

## 2023-02-27 NOTE — TELEPHONE ENCOUNTER
Talked to pt  to get info regarding appt for medical  clearance with pcp , she stated she is going on 03/09/23,  At 10am . Will wait for results.

## 2023-03-06 ENCOUNTER — OFFICE VISIT (OUTPATIENT)
Dept: FAMILY MEDICINE CLINIC | Facility: CLINIC | Age: 43
End: 2023-03-06
Payer: COMMERCIAL

## 2023-03-06 VITALS
WEIGHT: 126 LBS | BODY MASS INDEX: 20.74 KG/M2 | DIASTOLIC BLOOD PRESSURE: 62 MMHG | OXYGEN SATURATION: 98 % | SYSTOLIC BLOOD PRESSURE: 112 MMHG | TEMPERATURE: 100 F | HEART RATE: 102 BPM | HEIGHT: 65.5 IN

## 2023-03-06 DIAGNOSIS — J02.9 SORE THROAT: Primary | ICD-10-CM

## 2023-03-06 DIAGNOSIS — J02.0 STREP PHARYNGITIS: ICD-10-CM

## 2023-03-06 LAB
CONTROL LINE PRESENT WITH A CLEAR BACKGROUND (YES/NO): YES YES/NO
STREP GRP A CUL-SCR: POSITIVE

## 2023-03-06 RX ORDER — AMOXICILLIN 500 MG/1
500 CAPSULE ORAL 2 TIMES DAILY
Qty: 20 CAPSULE | Refills: 0 | Status: SHIPPED | OUTPATIENT
Start: 2023-03-06 | End: 2023-03-16

## 2023-03-09 ENCOUNTER — OFFICE VISIT (OUTPATIENT)
Dept: FAMILY MEDICINE CLINIC | Facility: CLINIC | Age: 43
End: 2023-03-09
Payer: COMMERCIAL

## 2023-03-09 ENCOUNTER — LAB ENCOUNTER (OUTPATIENT)
Dept: LAB | Age: 43
End: 2023-03-09
Attending: FAMILY MEDICINE
Payer: COMMERCIAL

## 2023-03-09 VITALS
SYSTOLIC BLOOD PRESSURE: 120 MMHG | WEIGHT: 128 LBS | RESPIRATION RATE: 16 BRPM | HEART RATE: 62 BPM | TEMPERATURE: 97 F | OXYGEN SATURATION: 98 % | DIASTOLIC BLOOD PRESSURE: 70 MMHG | HEIGHT: 65.5 IN | BODY MASS INDEX: 21.07 KG/M2

## 2023-03-09 DIAGNOSIS — Z01.818 PRE-OP TESTING: ICD-10-CM

## 2023-03-09 DIAGNOSIS — Z01.818 PRE-OP TESTING: Primary | ICD-10-CM

## 2023-03-09 LAB
ALBUMIN SERPL-MCNC: 3.5 G/DL (ref 3.4–5)
ALBUMIN/GLOB SERPL: 0.9 {RATIO} (ref 1–2)
ALP LIVER SERPL-CCNC: 68 U/L
ALT SERPL-CCNC: 19 U/L
ANION GAP SERPL CALC-SCNC: 5 MMOL/L (ref 0–18)
AST SERPL-CCNC: 12 U/L (ref 15–37)
ATRIAL RATE: 76 BPM
BASOPHILS # BLD AUTO: 0.05 X10(3) UL (ref 0–0.2)
BASOPHILS NFR BLD AUTO: 0.9 %
BILIRUB SERPL-MCNC: 0.3 MG/DL (ref 0.1–2)
BUN BLD-MCNC: 14 MG/DL (ref 7–18)
CALCIUM BLD-MCNC: 9.4 MG/DL (ref 8.5–10.1)
CHLORIDE SERPL-SCNC: 106 MMOL/L (ref 98–112)
CO2 SERPL-SCNC: 28 MMOL/L (ref 21–32)
CREAT BLD-MCNC: 0.63 MG/DL
EOSINOPHIL # BLD AUTO: 0.06 X10(3) UL (ref 0–0.7)
EOSINOPHIL NFR BLD AUTO: 1.1 %
ERYTHROCYTE [DISTWIDTH] IN BLOOD BY AUTOMATED COUNT: 14.6 %
FASTING STATUS PATIENT QL REPORTED: YES
GFR SERPLBLD BASED ON 1.73 SQ M-ARVRAT: 112 ML/MIN/1.73M2 (ref 60–?)
GLOBULIN PLAS-MCNC: 4.1 G/DL (ref 2.8–4.4)
GLUCOSE BLD-MCNC: 91 MG/DL (ref 70–99)
HCT VFR BLD AUTO: 39.9 %
HGB BLD-MCNC: 12.7 G/DL
IMM GRANULOCYTES # BLD AUTO: 0.03 X10(3) UL (ref 0–1)
IMM GRANULOCYTES NFR BLD: 0.5 %
LYMPHOCYTES # BLD AUTO: 1.94 X10(3) UL (ref 1–4)
LYMPHOCYTES NFR BLD AUTO: 35.3 %
MCH RBC QN AUTO: 29.3 PG (ref 26–34)
MCHC RBC AUTO-ENTMCNC: 31.8 G/DL (ref 31–37)
MCV RBC AUTO: 92.1 FL
MONOCYTES # BLD AUTO: 0.41 X10(3) UL (ref 0.1–1)
MONOCYTES NFR BLD AUTO: 7.5 %
NEUTROPHILS # BLD AUTO: 3 X10 (3) UL (ref 1.5–7.7)
NEUTROPHILS # BLD AUTO: 3 X10(3) UL (ref 1.5–7.7)
NEUTROPHILS NFR BLD AUTO: 54.7 %
OSMOLALITY SERPL CALC.SUM OF ELEC: 288 MOSM/KG (ref 275–295)
P AXIS: 67 DEGREES
P-R INTERVAL: 158 MS
PLATELET # BLD AUTO: 322 10(3)UL (ref 150–450)
POTASSIUM SERPL-SCNC: 3.9 MMOL/L (ref 3.5–5.1)
PROT SERPL-MCNC: 7.6 G/DL (ref 6.4–8.2)
Q-T INTERVAL: 374 MS
QRS DURATION: 70 MS
QTC CALCULATION (BEZET): 420 MS
R AXIS: 69 DEGREES
RBC # BLD AUTO: 4.33 X10(6)UL
SODIUM SERPL-SCNC: 139 MMOL/L (ref 136–145)
T AXIS: 55 DEGREES
VENTRICULAR RATE: 76 BPM
WBC # BLD AUTO: 5.5 X10(3) UL (ref 4–11)

## 2023-03-09 PROCEDURE — 3008F BODY MASS INDEX DOCD: CPT | Performed by: FAMILY MEDICINE

## 2023-03-09 PROCEDURE — 80053 COMPREHEN METABOLIC PANEL: CPT | Performed by: FAMILY MEDICINE

## 2023-03-09 PROCEDURE — 99243 OFF/OP CNSLTJ NEW/EST LOW 30: CPT | Performed by: FAMILY MEDICINE

## 2023-03-09 PROCEDURE — 85025 COMPLETE CBC W/AUTO DIFF WBC: CPT | Performed by: FAMILY MEDICINE

## 2023-03-09 PROCEDURE — 3074F SYST BP LT 130 MM HG: CPT | Performed by: FAMILY MEDICINE

## 2023-03-09 PROCEDURE — 93000 ELECTROCARDIOGRAM COMPLETE: CPT | Performed by: FAMILY MEDICINE

## 2023-03-09 PROCEDURE — 3078F DIAST BP <80 MM HG: CPT | Performed by: FAMILY MEDICINE

## 2023-03-15 ENCOUNTER — TELEPHONE (OUTPATIENT)
Facility: LOCATION | Age: 43
End: 2023-03-15

## 2023-03-15 NOTE — TELEPHONE ENCOUNTER
Certificate Information  Reference Number  [id number]    Status  PENDED    Review Reason 1  Additional Patient Information required    Message  If you have not already attached Clinical documentation, please edit and attach supporting documentation for review to complete. Exception - Referrals.     Member Information  Patient Name  Anthony Britton    Patient Date of Birth  [id number]    Patient Gender  Female    Member ID  [id number]    Relationship to [address] Name  Anthony Britton    Requesting Provider     Name  [address] [place], [place] [address]  [id number]    Tax Id  [id number]    Specialty  [id number]  Provider Role  Provider    Address  [address] [place] [address] [place], [address]    Phone  [phone]  Fax  [phone]    Contact Name  [facility]    Service Information  Service Type  2 - Surgical    Place of Service  [address] [place] [address] From - To Date  2023-03-23 - 2023-06-23    Level of Service  Elective    Diagnosis Code 1   - Other benign mammary dysplasias of unspecified breast    Procedure Code 1 (CPT/HCPCS)  30040 - MAST SIMPLE COMPLETE    Quantity  1 Units    Status  PENDED    Status Reason  Additional Patient Information required    Rendering Provider/Facility     Provider 1  Name  [address] [place] [address], [address]  [id number]    Specialty  [id number]  Provider Role  Attending    Address  [address] [place] [address] [place], [address]    Provider 2  Name  [facility]  [id number]    Provider Role  Facility    Address  [address] [place] [address] [place], [address]

## 2023-03-20 ENCOUNTER — TELEPHONE (OUTPATIENT)
Facility: LOCATION | Age: 43
End: 2023-03-20

## 2023-03-20 ENCOUNTER — LAB ENCOUNTER (OUTPATIENT)
Dept: LAB | Age: 43
End: 2023-03-20
Attending: SURGERY
Payer: COMMERCIAL

## 2023-03-20 DIAGNOSIS — Z01.818 PRE-OP TESTING: ICD-10-CM

## 2023-03-20 NOTE — TELEPHONE ENCOUNTER
Transaction ID: [id number] ID: [id number] Date: 2023-03-20  Kevin Colón Patient  Member ID  [id number]    Date of Birth  [id number]    Gender  NA    Transaction Type  Outpatient Authorization    Organization  [address] [place] [address]    Payer  [facility] logo  Certificate Information  Certification Number  [id number]    Status  CERTIFIED IN TOTAL    Service Information  Service Type  2 - Surgical    Place of Service  25 - [facility]    Service From - To Date  2023-03-15 - 2023-09-15    Diagnosis Code 1   - Other benign mammary dysplasias of unspecified breast    Procedure Code 1 (CPT/HCPCS)  56435 - MAST SIMPLE COMPLETE    Quantity  2 Units    Status  CERTIFIED IN TOTAL    Rendering Providers     Provider 1  Name  [facility]    NPI  [id number]    Provider Role  Provider Organization    Address  [address] [place] [address]    Provider 2  Name  [facility]    NPI  [id number]    Provider Role  Facility    Address  [place] [address]    Provider 3  Name  [place]    NPI  [id number]    Provider Role  Attending    Address  [facility] [address] [place] [address]

## 2023-03-21 ENCOUNTER — TELEPHONE (OUTPATIENT)
Dept: SURGERY | Facility: CLINIC | Age: 43
End: 2023-03-21

## 2023-03-21 ENCOUNTER — TELEPHONE (OUTPATIENT)
Facility: LOCATION | Age: 43
End: 2023-03-21

## 2023-03-21 LAB — SARS-COV-2 RNA RESP QL NAA+PROBE: DETECTED

## 2023-03-21 NOTE — TELEPHONE ENCOUNTER
Spoke to patient regarding her positive COVID diagnosis. Patient is asymptomatic. Surgery scheduler was notified. She will be rescheduled per PAT guidelines.

## 2023-03-21 NOTE — TELEPHONE ENCOUNTER
pt tested positive for covid, surgery is scheduled this Thursday   Riley Calderon ( surgery scheduling aware)

## 2023-03-21 NOTE — TELEPHONE ENCOUNTER
informed pt she has to be rescheduled due to being covid + and that we would let her know as soon as we can what the new date it.

## 2023-03-22 ENCOUNTER — APPOINTMENT (OUTPATIENT)
Dept: HEMATOLOGY/ONCOLOGY | Facility: HOSPITAL | Age: 43
End: 2023-03-22
Attending: SURGERY
Payer: COMMERCIAL

## 2023-03-27 DIAGNOSIS — N60.99 ATYPICAL DUCTAL HYPERPLASIA OF BREAST: Primary | ICD-10-CM

## 2023-03-28 ENCOUNTER — NURSE NAVIGATOR ENCOUNTER (OUTPATIENT)
Dept: HEMATOLOGY/ONCOLOGY | Facility: HOSPITAL | Age: 43
End: 2023-03-28

## 2023-03-28 NOTE — PROGRESS NOTES
LMOVMTCB in regards to offering the pre op mastectomy class before her scheduled surgery with Dr. Shelly Echevarria and Dr. Marlee Galeazzi for April 19, 2023 for either Wednesday April 5, 2023 or Wednesday April 12, 2023 at the [facility] with the breast nurse navigators. Awaiting phone call back from the patient.

## 2023-03-29 ENCOUNTER — TELEPHONE (OUTPATIENT)
Dept: FAMILY MEDICINE CLINIC | Facility: CLINIC | Age: 43
End: 2023-03-29

## 2023-03-29 DIAGNOSIS — R92.0 MICROCALCIFICATION OF RIGHT BREAST ON MAMMOGRAM: Primary | ICD-10-CM

## 2023-03-29 DIAGNOSIS — Z01.818 PRE-OP TESTING: ICD-10-CM

## 2023-03-29 NOTE — TELEPHONE ENCOUNTER
Received medical clearance request from Dr. Shauna Gillespie. Surgery date: 4/19/23  Patient needs to have CBC, CMP, EKG and medical clearance within 30 days of the surgery date per request.    -please call patient to schedule pre-op appt and have her do testing prior to appt.   Labs and EKG pended

## 2023-03-30 NOTE — TELEPHONE ENCOUNTER
Future Appointments   Date Time Provider Anthony Dykes   4/4/2023  2:00 PM Kaleyn Manuel, DO [facility] [address] [facility]   4/5/2023 11:00 AM Max Bautista RN [facility] HEM Linsey Radar   4/19/2023 11:30 AM [facility] NUC RM1 [address]     Pt will have labs done prior, and will try to do EKG as well.

## 2023-04-01 ENCOUNTER — LAB ENCOUNTER (OUTPATIENT)
Dept: LAB | Age: 43
End: 2023-04-01
Attending: FAMILY MEDICINE
Payer: COMMERCIAL

## 2023-04-01 DIAGNOSIS — Z01.818 PRE-OP TESTING: ICD-10-CM

## 2023-04-01 DIAGNOSIS — R92.0 MICROCALCIFICATION OF RIGHT BREAST ON MAMMOGRAM: ICD-10-CM

## 2023-04-01 LAB
ALBUMIN SERPL-MCNC: 3.8 G/DL (ref 3.4–5)
ALBUMIN/GLOB SERPL: 1 {RATIO} (ref 1–2)
ALP LIVER SERPL-CCNC: 61 U/L
ALT SERPL-CCNC: 15 U/L
ANION GAP SERPL CALC-SCNC: 3 MMOL/L (ref 0–18)
AST SERPL-CCNC: 11 U/L (ref 15–37)
BASOPHILS # BLD AUTO: 0.08 X10(3) UL (ref 0–0.2)
BASOPHILS NFR BLD AUTO: 1.6 %
BILIRUB SERPL-MCNC: 0.5 MG/DL (ref 0.1–2)
BUN BLD-MCNC: 18 MG/DL (ref 7–18)
CALCIUM BLD-MCNC: 9.4 MG/DL (ref 8.5–10.1)
CHLORIDE SERPL-SCNC: 111 MMOL/L (ref 98–112)
CO2 SERPL-SCNC: 27 MMOL/L (ref 21–32)
CREAT BLD-MCNC: 0.75 MG/DL
EOSINOPHIL # BLD AUTO: 0.16 X10(3) UL (ref 0–0.7)
EOSINOPHIL NFR BLD AUTO: 3.2 %
ERYTHROCYTE [DISTWIDTH] IN BLOOD BY AUTOMATED COUNT: 14.7 %
FASTING STATUS PATIENT QL REPORTED: YES
GFR SERPLBLD BASED ON 1.73 SQ M-ARVRAT: 101 ML/MIN/1.73M2 (ref 60–?)
GLOBULIN PLAS-MCNC: 3.7 G/DL (ref 2.8–4.4)
GLUCOSE BLD-MCNC: 81 MG/DL (ref 70–99)
HCT VFR BLD AUTO: 41.1 %
HGB BLD-MCNC: 12.9 G/DL
IMM GRANULOCYTES # BLD AUTO: 0.01 X10(3) UL (ref 0–1)
IMM GRANULOCYTES NFR BLD: 0.2 %
LYMPHOCYTES # BLD AUTO: 2.17 X10(3) UL (ref 1–4)
LYMPHOCYTES NFR BLD AUTO: 42.9 %
MCH RBC QN AUTO: 29.3 PG (ref 26–34)
MCHC RBC AUTO-ENTMCNC: 31.4 G/DL (ref 31–37)
MCV RBC AUTO: 93.2 FL
MONOCYTES # BLD AUTO: 0.46 X10(3) UL (ref 0.1–1)
MONOCYTES NFR BLD AUTO: 9.1 %
NEUTROPHILS # BLD AUTO: 2.18 X10 (3) UL (ref 1.5–7.7)
NEUTROPHILS # BLD AUTO: 2.18 X10(3) UL (ref 1.5–7.7)
NEUTROPHILS NFR BLD AUTO: 43 %
OSMOLALITY SERPL CALC.SUM OF ELEC: 293 MOSM/KG (ref 275–295)
PLATELET # BLD AUTO: 303 10(3)UL (ref 150–450)
POTASSIUM SERPL-SCNC: 4.3 MMOL/L (ref 3.5–5.1)
PROT SERPL-MCNC: 7.5 G/DL (ref 6.4–8.2)
RBC # BLD AUTO: 4.41 X10(6)UL
SODIUM SERPL-SCNC: 141 MMOL/L (ref 136–145)
WBC # BLD AUTO: 5.1 X10(3) UL (ref 4–11)

## 2023-04-01 PROCEDURE — 85025 COMPLETE CBC W/AUTO DIFF WBC: CPT

## 2023-04-01 PROCEDURE — 80053 COMPREHEN METABOLIC PANEL: CPT

## 2023-04-01 PROCEDURE — 36415 COLL VENOUS BLD VENIPUNCTURE: CPT

## 2023-04-04 ENCOUNTER — OFFICE VISIT (OUTPATIENT)
Dept: FAMILY MEDICINE CLINIC | Facility: CLINIC | Age: 43
End: 2023-04-04
Payer: COMMERCIAL

## 2023-04-04 VITALS
OXYGEN SATURATION: 98 % | TEMPERATURE: 97 F | RESPIRATION RATE: 16 BRPM | BODY MASS INDEX: 21.35 KG/M2 | WEIGHT: 128.13 LBS | HEART RATE: 76 BPM | SYSTOLIC BLOOD PRESSURE: 118 MMHG | DIASTOLIC BLOOD PRESSURE: 70 MMHG | HEIGHT: 65 IN

## 2023-04-04 DIAGNOSIS — R92.0 MICROCALCIFICATION OF RIGHT BREAST ON MAMMOGRAM: ICD-10-CM

## 2023-04-04 DIAGNOSIS — Z01.818 PRE-OP TESTING: Primary | ICD-10-CM

## 2023-04-04 PROCEDURE — 3074F SYST BP LT 130 MM HG: CPT | Performed by: FAMILY MEDICINE

## 2023-04-04 PROCEDURE — 99243 OFF/OP CNSLTJ NEW/EST LOW 30: CPT | Performed by: FAMILY MEDICINE

## 2023-04-04 PROCEDURE — 3008F BODY MASS INDEX DOCD: CPT | Performed by: FAMILY MEDICINE

## 2023-04-04 PROCEDURE — 3078F DIAST BP <80 MM HG: CPT | Performed by: FAMILY MEDICINE

## 2023-04-04 PROCEDURE — 93000 ELECTROCARDIOGRAM COMPLETE: CPT | Performed by: FAMILY MEDICINE

## 2023-04-05 ENCOUNTER — NURSE ONLY (OUTPATIENT)
Dept: HEMATOLOGY/ONCOLOGY | Facility: HOSPITAL | Age: 43
End: 2023-04-05
Attending: SURGERY
Payer: COMMERCIAL

## 2023-04-05 NOTE — PROGRESS NOTES
Patient and support person attended pre-operative mastectomy course in the cancer center. Discussed what to expect on day of surgery, PAT phone calls and COVID testing, sentinel lymph node mapping procedure, mastectomy bra and dressings, drain care and measurement on drain log, pain control, medication and constipation prevention, lymphedema awareness, post-op exercises and provided samples of various styles of breast prosthesis. During class was provided ample hands-on time to practice stripping JEROME drain tubing, reviewing drainage cup and reattaching JEROME drain to mastectomy bras. Patient was provided a bag with heart-shaped splinting pillow, drain lanyard, gauze/kerlix, bio patch, tegaderm, abd pads and drainage cup. Resources provided for [employer], post-operative resources, drain log and mastectomy education. Pathology and follow-up timelines reviewed. Re-enforced teaching regarding emergency care and when to contact surgeon's office. After class emailed PowerPoint slides, drain care video and supplemental information from the [employer]. Patient was given navigator contact information and encouraged to reach out with any other questions or concerns.

## 2023-04-06 LAB
ATRIAL RATE: 67 BPM
P AXIS: 69 DEGREES
P-R INTERVAL: 164 MS
Q-T INTERVAL: 396 MS
QRS DURATION: 68 MS
QTC CALCULATION (BEZET): 418 MS
R AXIS: 73 DEGREES
T AXIS: 67 DEGREES
VENTRICULAR RATE: 67 BPM

## 2023-04-19 ENCOUNTER — HOSPITAL ENCOUNTER (OUTPATIENT)
Facility: HOSPITAL | Age: 43
Setting detail: HOSPITAL OUTPATIENT SURGERY
Discharge: HOME | End: 2023-04-19
Attending: SURGERY | Admitting: SURGERY
Payer: COMMERCIAL

## 2023-04-19 ENCOUNTER — HOSPITAL ENCOUNTER (OUTPATIENT)
Dept: NUCLEAR MEDICINE | Facility: HOSPITAL | Age: 43
Discharge: HOME | End: 2023-04-19
Attending: SURGERY
Payer: COMMERCIAL

## 2023-04-19 ENCOUNTER — APPOINTMENT (OUTPATIENT)
Dept: GENERAL RADIOLOGY | Facility: HOSPITAL | Age: 43
End: 2023-04-19
Attending: SURGERY
Payer: COMMERCIAL

## 2023-04-19 ENCOUNTER — ANESTHESIA (OUTPATIENT)
Dept: SURGERY | Facility: HOSPITAL | Age: 43
End: 2023-04-19
Payer: COMMERCIAL

## 2023-04-19 ENCOUNTER — ANESTHESIA EVENT (OUTPATIENT)
Dept: SURGERY | Facility: HOSPITAL | Age: 43
End: 2023-04-19
Payer: COMMERCIAL

## 2023-04-19 VITALS
OXYGEN SATURATION: 99 % | HEIGHT: 65 IN | TEMPERATURE: 98 F | SYSTOLIC BLOOD PRESSURE: 112 MMHG | RESPIRATION RATE: 12 BRPM | WEIGHT: 125 LBS | DIASTOLIC BLOOD PRESSURE: 64 MMHG | HEART RATE: 75 BPM | BODY MASS INDEX: 20.83 KG/M2

## 2023-04-19 DIAGNOSIS — N60.91 ATYPICAL DUCTAL HYPERPLASIA OF RIGHT BREAST: ICD-10-CM

## 2023-04-19 DIAGNOSIS — N60.99 ATYPICAL LOBULAR HYPERPLASIA (ALH) OF BREAST: Primary | ICD-10-CM

## 2023-04-19 DIAGNOSIS — N60.99 ATYPICAL DUCTAL HYPERPLASIA OF BREAST: ICD-10-CM

## 2023-04-19 LAB — B-HCG UR QL: NEGATIVE

## 2023-04-19 PROCEDURE — 88307 TISSUE EXAM BY PATHOLOGIST: CPT | Performed by: SURGERY

## 2023-04-19 PROCEDURE — 78195 LYMPH SYSTEM IMAGING: CPT | Performed by: SURGERY

## 2023-04-19 PROCEDURE — 88331 PATH CONSLTJ SURG 1 BLK 1SPC: CPT | Performed by: SURGERY

## 2023-04-19 PROCEDURE — 07B50ZX EXCISION OF RIGHT AXILLARY LYMPHATIC, OPEN APPROACH, DIAGNOSTIC: ICD-10-PCS | Performed by: SURGERY

## 2023-04-19 PROCEDURE — 88360 TUMOR IMMUNOHISTOCHEM/MANUAL: CPT | Performed by: SURGERY

## 2023-04-19 PROCEDURE — 0HHV0NZ INSERTION OF TISSUE EXPANDER INTO BILATERAL BREAST, OPEN APPROACH: ICD-10-PCS | Performed by: SURGERY

## 2023-04-19 PROCEDURE — 88332 PATH CONSLTJ SURG EA ADD BLK: CPT | Performed by: SURGERY

## 2023-04-19 PROCEDURE — 88305 TISSUE EXAM BY PATHOLOGIST: CPT | Performed by: SURGERY

## 2023-04-19 PROCEDURE — 3E0W3KZ INTRODUCTION OF OTHER DIAGNOSTIC SUBSTANCE INTO LYMPHATICS, PERCUTANEOUS APPROACH: ICD-10-PCS | Performed by: SURGERY

## 2023-04-19 PROCEDURE — 76942 ECHO GUIDE FOR BIOPSY: CPT | Performed by: ANESTHESIOLOGY

## 2023-04-19 PROCEDURE — 88341 IMHCHEM/IMCYTCHM EA ADD ANTB: CPT | Performed by: SURGERY

## 2023-04-19 PROCEDURE — 88342 IMHCHEM/IMCYTCHM 1ST ANTB: CPT | Performed by: SURGERY

## 2023-04-19 PROCEDURE — 81025 URINE PREGNANCY TEST: CPT

## 2023-04-19 PROCEDURE — 0HTV0ZZ RESECTION OF BILATERAL BREAST, OPEN APPROACH: ICD-10-PCS | Performed by: SURGERY

## 2023-04-19 DEVICE — BREAST TISSUE EXPANDER, SUTURE TABS, INTEGRAL INJECTION DOME, 535CC
Type: IMPLANTABLE DEVICE | Site: BREAST | Status: FUNCTIONAL
Brand: MENTOR ARTOURA PLUS, SMOOTH, ULTRA HIGH PROFILE

## 2023-04-19 DEVICE — MATRIX ALLODERM SELECT: Type: IMPLANTABLE DEVICE | Site: BREAST | Status: FUNCTIONAL

## 2023-04-19 RX ORDER — ONDANSETRON 2 MG/ML
4 INJECTION INTRAMUSCULAR; INTRAVENOUS EVERY 6 HOURS PRN
Status: DISCONTINUED | OUTPATIENT
Start: 2023-04-19 | End: 2023-04-20

## 2023-04-19 RX ORDER — CEPHALEXIN 500 MG/1
500 CAPSULE ORAL 4 TIMES DAILY
Qty: 40 CAPSULE | Refills: 1 | Status: SHIPPED | OUTPATIENT
Start: 2023-04-19

## 2023-04-19 RX ORDER — HYDROMORPHONE HYDROCHLORIDE 1 MG/ML
0.2 INJECTION, SOLUTION INTRAMUSCULAR; INTRAVENOUS; SUBCUTANEOUS EVERY 5 MIN PRN
Status: DISCONTINUED | OUTPATIENT
Start: 2023-04-19 | End: 2023-04-20

## 2023-04-19 RX ORDER — DIAZEPAM 5 MG/1
5 TABLET ORAL AS NEEDED
Status: DISCONTINUED | OUTPATIENT
Start: 2023-04-19 | End: 2023-04-19 | Stop reason: HOSPADM

## 2023-04-19 RX ORDER — ACETAMINOPHEN 500 MG
1000 TABLET ORAL ONCE AS NEEDED
Status: COMPLETED | OUTPATIENT
Start: 2023-04-19 | End: 2023-04-19

## 2023-04-19 RX ORDER — ONDANSETRON 4 MG/1
4 TABLET, FILM COATED ORAL EVERY 8 HOURS PRN
Qty: 12 TABLET | Refills: 0 | Status: SHIPPED | OUTPATIENT
Start: 2023-04-19

## 2023-04-19 RX ORDER — DEXAMETHASONE SODIUM PHOSPHATE 4 MG/ML
VIAL (ML) INJECTION AS NEEDED
Status: DISCONTINUED | OUTPATIENT
Start: 2023-04-19 | End: 2023-04-19 | Stop reason: SURG

## 2023-04-19 RX ORDER — HYDROMORPHONE HYDROCHLORIDE 1 MG/ML
0.8 INJECTION, SOLUTION INTRAMUSCULAR; INTRAVENOUS; SUBCUTANEOUS EVERY 2 HOUR PRN
Status: CANCELLED | OUTPATIENT
Start: 2023-04-19

## 2023-04-19 RX ORDER — LABETALOL HYDROCHLORIDE 5 MG/ML
5 INJECTION, SOLUTION INTRAVENOUS EVERY 5 MIN PRN
Status: DISCONTINUED | OUTPATIENT
Start: 2023-04-19 | End: 2023-04-20

## 2023-04-19 RX ORDER — LIDOCAINE HYDROCHLORIDE 10 MG/ML
INJECTION, SOLUTION EPIDURAL; INFILTRATION; INTRACAUDAL; PERINEURAL AS NEEDED
Status: DISCONTINUED | OUTPATIENT
Start: 2023-04-19 | End: 2023-04-19 | Stop reason: SURG

## 2023-04-19 RX ORDER — DOCUSATE SODIUM 100 MG/1
100 CAPSULE, LIQUID FILLED ORAL 2 TIMES DAILY
Qty: 30 CAPSULE | Refills: 1 | Status: SHIPPED | OUTPATIENT
Start: 2023-04-19

## 2023-04-19 RX ORDER — HYDROCODONE BITARTRATE AND ACETAMINOPHEN 5; 325 MG/1; MG/1
1 TABLET ORAL ONCE AS NEEDED
Status: COMPLETED | OUTPATIENT
Start: 2023-04-19 | End: 2023-04-19

## 2023-04-19 RX ORDER — SODIUM CHLORIDE, SODIUM LACTATE, POTASSIUM CHLORIDE, CALCIUM CHLORIDE 600; 310; 30; 20 MG/100ML; MG/100ML; MG/100ML; MG/100ML
INJECTION, SOLUTION INTRAVENOUS CONTINUOUS
Status: DISCONTINUED | OUTPATIENT
Start: 2023-04-19 | End: 2023-04-20

## 2023-04-19 RX ORDER — LIDOCAINE AND PRILOCAINE 25; 25 MG/G; MG/G
CREAM TOPICAL ONCE
Status: COMPLETED | OUTPATIENT
Start: 2023-04-19 | End: 2023-04-19

## 2023-04-19 RX ORDER — MEPERIDINE HYDROCHLORIDE 25 MG/ML
12.5 INJECTION INTRAMUSCULAR; INTRAVENOUS; SUBCUTANEOUS AS NEEDED
Status: DISCONTINUED | OUTPATIENT
Start: 2023-04-19 | End: 2023-04-20

## 2023-04-19 RX ORDER — INDOCYANINE GREEN AND WATER 25 MG
KIT INJECTION AS NEEDED
Status: DISCONTINUED | OUTPATIENT
Start: 2023-04-19 | End: 2023-04-19 | Stop reason: SURG

## 2023-04-19 RX ORDER — HYDROMORPHONE HYDROCHLORIDE 1 MG/ML
0.6 INJECTION, SOLUTION INTRAMUSCULAR; INTRAVENOUS; SUBCUTANEOUS EVERY 5 MIN PRN
Status: DISCONTINUED | OUTPATIENT
Start: 2023-04-19 | End: 2023-04-20

## 2023-04-19 RX ORDER — SCOLOPAMINE TRANSDERMAL SYSTEM 1 MG/1
1 PATCH, EXTENDED RELEASE TRANSDERMAL ONCE
Status: DISCONTINUED | OUTPATIENT
Start: 2023-04-19 | End: 2023-04-20

## 2023-04-19 RX ORDER — METOCLOPRAMIDE HYDROCHLORIDE 5 MG/ML
10 INJECTION INTRAMUSCULAR; INTRAVENOUS EVERY 6 HOURS PRN
Status: CANCELLED | OUTPATIENT
Start: 2023-04-19

## 2023-04-19 RX ORDER — BISACODYL 10 MG
10 SUPPOSITORY, RECTAL RECTAL
Status: CANCELLED | OUTPATIENT
Start: 2023-04-19

## 2023-04-19 RX ORDER — HYDROMORPHONE HYDROCHLORIDE 1 MG/ML
0.4 INJECTION, SOLUTION INTRAMUSCULAR; INTRAVENOUS; SUBCUTANEOUS EVERY 5 MIN PRN
Status: DISCONTINUED | OUTPATIENT
Start: 2023-04-19 | End: 2023-04-20

## 2023-04-19 RX ORDER — CEFAZOLIN SODIUM/WATER 2 G/20 ML
2 SYRINGE (ML) INTRAVENOUS ONCE
Status: COMPLETED | OUTPATIENT
Start: 2023-04-19 | End: 2023-04-19

## 2023-04-19 RX ORDER — SODIUM PHOSPHATE, DIBASIC AND SODIUM PHOSPHATE, MONOBASIC 7; 19 G/133ML; G/133ML
1 ENEMA RECTAL ONCE AS NEEDED
Status: CANCELLED | OUTPATIENT
Start: 2023-04-19

## 2023-04-19 RX ORDER — SODIUM CHLORIDE, SODIUM LACTATE, POTASSIUM CHLORIDE, CALCIUM CHLORIDE 600; 310; 30; 20 MG/100ML; MG/100ML; MG/100ML; MG/100ML
INJECTION, SOLUTION INTRAVENOUS CONTINUOUS
Status: CANCELLED | OUTPATIENT
Start: 2023-04-19

## 2023-04-19 RX ORDER — HYDROCODONE BITARTRATE AND ACETAMINOPHEN 5; 325 MG/1; MG/1
2 TABLET ORAL ONCE AS NEEDED
Status: COMPLETED | OUTPATIENT
Start: 2023-04-19 | End: 2023-04-19

## 2023-04-19 RX ORDER — HYDROCODONE BITARTRATE AND ACETAMINOPHEN 5; 325 MG/1; MG/1
1-2 TABLET ORAL EVERY 4 HOURS PRN
Qty: 20 TABLET | Refills: 0 | Status: SHIPPED | OUTPATIENT
Start: 2023-04-19

## 2023-04-19 RX ORDER — OXYCODONE HYDROCHLORIDE 5 MG/1
5 TABLET ORAL EVERY 4 HOURS PRN
Status: CANCELLED | OUTPATIENT
Start: 2023-04-19

## 2023-04-19 RX ORDER — PROCHLORPERAZINE EDISYLATE 5 MG/ML
5 INJECTION INTRAMUSCULAR; INTRAVENOUS EVERY 8 HOURS PRN
Status: DISCONTINUED | OUTPATIENT
Start: 2023-04-19 | End: 2023-04-20

## 2023-04-19 RX ORDER — OXYCODONE HYDROCHLORIDE 5 MG/1
10 TABLET ORAL EVERY 4 HOURS PRN
Status: CANCELLED | OUTPATIENT
Start: 2023-04-19

## 2023-04-19 RX ORDER — MIDAZOLAM HYDROCHLORIDE 1 MG/ML
1 INJECTION INTRAMUSCULAR; INTRAVENOUS EVERY 5 MIN PRN
Status: DISCONTINUED | OUTPATIENT
Start: 2023-04-19 | End: 2023-04-20

## 2023-04-19 RX ORDER — MIDAZOLAM HYDROCHLORIDE 1 MG/ML
INJECTION INTRAMUSCULAR; INTRAVENOUS AS NEEDED
Status: DISCONTINUED | OUTPATIENT
Start: 2023-04-19 | End: 2023-04-19 | Stop reason: SURG

## 2023-04-19 RX ORDER — ROCURONIUM BROMIDE 10 MG/ML
INJECTION, SOLUTION INTRAVENOUS AS NEEDED
Status: DISCONTINUED | OUTPATIENT
Start: 2023-04-19 | End: 2023-04-19 | Stop reason: SURG

## 2023-04-19 RX ORDER — ACETAMINOPHEN 500 MG
1000 TABLET ORAL EVERY 8 HOURS SCHEDULED
Status: CANCELLED | OUTPATIENT
Start: 2023-04-19

## 2023-04-19 RX ORDER — POLYETHYLENE GLYCOL 3350 17 G/17G
17 POWDER, FOR SOLUTION ORAL DAILY PRN
Status: CANCELLED | OUTPATIENT
Start: 2023-04-19

## 2023-04-19 RX ORDER — CEFAZOLIN SODIUM/WATER 2 G/20 ML
2 SYRINGE (ML) INTRAVENOUS EVERY 8 HOURS
Status: CANCELLED | OUTPATIENT
Start: 2023-04-19 | End: 2023-04-20

## 2023-04-19 RX ORDER — ACETAMINOPHEN 500 MG
1000 TABLET ORAL ONCE
Status: DISCONTINUED | OUTPATIENT
Start: 2023-04-19 | End: 2023-04-19 | Stop reason: HOSPADM

## 2023-04-19 RX ORDER — HYDROMORPHONE HYDROCHLORIDE 1 MG/ML
INJECTION, SOLUTION INTRAMUSCULAR; INTRAVENOUS; SUBCUTANEOUS
Status: COMPLETED
Start: 2023-04-19 | End: 2023-04-19

## 2023-04-19 RX ORDER — ONDANSETRON 2 MG/ML
4 INJECTION INTRAMUSCULAR; INTRAVENOUS EVERY 6 HOURS PRN
Status: CANCELLED | OUTPATIENT
Start: 2023-04-19

## 2023-04-19 RX ORDER — NALOXONE HYDROCHLORIDE 0.4 MG/ML
80 INJECTION, SOLUTION INTRAMUSCULAR; INTRAVENOUS; SUBCUTANEOUS AS NEEDED
Status: DISCONTINUED | OUTPATIENT
Start: 2023-04-19 | End: 2023-04-20

## 2023-04-19 RX ORDER — SENNOSIDES 8.6 MG
17.2 TABLET ORAL NIGHTLY PRN
Status: CANCELLED | OUTPATIENT
Start: 2023-04-19

## 2023-04-19 RX ORDER — ONDANSETRON 2 MG/ML
INJECTION INTRAMUSCULAR; INTRAVENOUS
Status: DISCONTINUED
Start: 2023-04-19 | End: 2023-04-19 | Stop reason: WASHOUT

## 2023-04-19 RX ORDER — HYDROMORPHONE HYDROCHLORIDE 1 MG/ML
0.4 INJECTION, SOLUTION INTRAMUSCULAR; INTRAVENOUS; SUBCUTANEOUS EVERY 2 HOUR PRN
Status: CANCELLED | OUTPATIENT
Start: 2023-04-19

## 2023-04-19 RX ORDER — CEFAZOLIN SODIUM/WATER 2 G/20 ML
SYRINGE (ML) INTRAVENOUS
Status: DISPENSED
Start: 2023-04-19 | End: 2023-04-19

## 2023-04-19 RX ADMIN — DEXAMETHASONE SODIUM PHOSPHATE 8 MG: 4 MG/ML VIAL (ML) INJECTION at 13:28:00

## 2023-04-19 RX ADMIN — LIDOCAINE HYDROCHLORIDE 50 MG: 10 INJECTION, SOLUTION EPIDURAL; INFILTRATION; INTRACAUDAL; PERINEURAL at 13:22:00

## 2023-04-19 RX ADMIN — INDOCYANINE GREEN AND WATER 7.5 MG: 25 MG KIT INJECTION at 16:19:00

## 2023-04-19 RX ADMIN — MIDAZOLAM HYDROCHLORIDE 2 MG: 1 INJECTION INTRAMUSCULAR; INTRAVENOUS at 13:22:00

## 2023-04-19 RX ADMIN — CEFAZOLIN SODIUM/WATER 2 G: 2 G/20 ML SYRINGE (ML) INTRAVENOUS at 13:42:00

## 2023-04-19 RX ADMIN — ROCURONIUM BROMIDE 50 MG: 10 INJECTION, SOLUTION INTRAVENOUS at 13:22:00

## 2023-04-19 NOTE — ANESTHESIA PROCEDURE NOTES
Airway  Date/Time: 4/19/2023 1:24 PM  Urgency: elective      General Information and Staff    Patient location during procedure: OR  Anesthesiologist: Lisa Avendano MD  Performed: anesthesiologist   Performed by: Lisa Avendano MD  Authorized by: Lisa Avendano MD      Indications and Patient Condition  Indications for airway management: anesthesia  Spontaneous Ventilation: absent  Sedation level: deep  Preoxygenated: yes  Patient position: sniffing  Mask difficulty assessment: 1 - vent by mask    Final Airway Details  Final airway type: endotracheal airway      Successful airway: ETT  Cuffed: yes   Successful intubation technique: direct laryngoscopy  Endotracheal tube insertion site: oral  Blade: Nyla  Blade size: #3  ETT size (mm): 7.0    Cormack-Lehane Classification: grade I - full view of glottis  Placement verified by: chest auscultation and capnometry   Measured from: lips  ETT to lips (cm): 21  Number of attempts at approach: 1

## 2023-04-19 NOTE — OPERATIVE REPORT
PREOPERATIVE DIAGNOSIS: Atypical lobular hyperplasia and atypical ductal hyperplasia of the right breast with acquired absence of bilateral breasts. POSTOPERATIVE DIAGNOSIS: Atypical lobular hyperplasia and atypical ductal hyperplasia of the right breast with acquired absence of bilateral breasts. PROCEDURE PERFORMED: Immediate bilateral breast reconstruction with tissue expander and acellular dermal matrix. ASSISTANT: Mouna Gonzalez SA  ANESTHESIA: General with endotracheal intubation. ESTIMATED BLOOD LOSS: 10ml  DRAINS: Laurie See x 2  SPECIMENS: Right breast tissue and left breast skin  COMPLICATIONS: None. FINDINGS: Bilateral breasts reconstructed with Wilson Artoura tissue expander smooth tissue expander, 535 mL. On the right, reference SD-120,  serial [id number]. On the left, reference [facility]-120, serial [id number]. The tissue expanders were placed in the prepectoral position with anterior coverage of Alloderm. No intraoperative expansion was performed. INDICATIONS: The patient is a 40-year-old female with a history of atypical lobular hyperplasia and atypical ductal hyperplasia of the right breast. The patient was evaluated by Dr. Rosie Thomas and elected to undergo bilateral nipple-sparing mastectomy. She was referred preoperatively to discuss reconstructive options and opted for immediate reconstruction with tissue expanders and acellular dermal matrix. PROCEDURE: Informed consent was obtained from the patient. The risks, benefits, and alternatives were reviewed with the patient preoperatively. She expressed understanding and wished to proceed. The patient was marked in the preoperative holding area in the upright position. The midline, inframammary fold, lateral fold of the breasts were marked. Inframammary fold incisions were marked in conjunction with Dr. Rosie Thomas.  The patient was then [facility] where she underwent bilateral nipple-sparing mastectomy. Once the mastectomies were completed, I entered the room and the plastic surgery portion of the procedure began. The surgical site was re-draped sterilely. Given the possibility of direct implant reconstruction indocyanine green laser imaging was performed with sizers in place to assess mastectomy skin flap perfusion. This revealed significant central hyperperfusion bilaterally, worse on the left side in an area which clinically corresponded to an area of thermal injury to the underlying mastectomy dermis. Given these findings, decision was made to proceed with tissue expander reconstruction rather than implant placement. The pockets were irrigated with warm saline irrigation until all particulate fat was evacuated. A small amount of residual breast tissue was noted on the lateral aspect of the right pocket overlying the pectoralis fascia. This was excised electrocautery and sent for permanent pathologic analysis. Hemostasis was then secured with electrocautery. Next, the pockets were irrigated with Betadine irrigation and then with antibiotic irrigation until clear. Next gloves were changed and 4 sheets of medium contour perforated AlloDerm were brought on the field and prepared in saline. Two sheets of AlloDerm were secured along the long axis the running 2-0 PDS suture. The tissue expanders were then brought on the field and immediately bathed in  antibiotic irrigation. They were checked for integrity and noted to be intact. The AlloDerm was draped over the anterior surface of the tissue expander. Fenestrations were then created to allow passage of the suture tabs which were secured to the  AlloDerm with interrupted 3-0 Vicryl sutures. A posterior pursestring suture of 2-0 PDS was then placed. An identical construct was created for both sides. The expanders were then accessed and all air was evacuated.       Gloves were then changed and the surgical sites were isolated with Rashel.  The tissue expander/AlloDerm constructs were then delivered via the incisions and sutured to the chest wall with interrupted 2-0 Prolene sutures. The AlloDerm along the inferior gutter was secured along the inframammary fold with interrupted 2-0 Vicryl sutures. Superiorly the AlloDerm was secured to the pectoralis muscle with interrupted 2-0 Vicryl sutures. Laterally, the AlloDerm was secured to the serratus fascia with interrupted 2-0 Vicryl sutures. The mastectomy margins on the left side were sharply excised and sent to Pathology as left mastectomy skin. A single 15-Uzbek Yosi per side was exited through a lateral stab incision and sutured to the skin with 3-0 nylon suture. e skin was then closed with interrupted 3-0 Vicryl deep dermal suture and running 4-0 Monocryl subcuticular suture. The drain sites were dressed with BioPatch and Tegaderm. The incisions dressed with Dermabond, steristrips, Fluff gauze, and a surgical bra. The patient was awakened, extubated, and taken to the recovery area in stable condition. There were no operative complications. All needle, sponge, and instrument counts were correct at the end of the procedure.

## 2023-04-19 NOTE — PROGRESS NOTES
Plan for bilateral NS-mastectomy by Dr. Parth Alonso and immediate reconstruction with tissue expander/silicone implant and acellular dermal matrix reviewed with patient. The risks of surgery including but not limited to bleeding, infection, scarring, delayed wound healing, seroma, asymmetry, implant infection/extrusion requiring removal, expander deflation, injury to adjacent structures, capsular contracture, ALCL, and need for further surgery were reviewed. The expected post-operative course was discussed. Questions were answered to the patient's satisfaction. No guarantees as to outcome were offered. The patient expresses understanding and wishes to proceed.

## 2023-04-19 NOTE — ANESTHESIA PROCEDURE NOTES
Regional Block    Performed by: Mehreen Gant MD  Authorized by: Mehreen Gant MD      General Information and Staff    Start Time:   Anesthesiologist:  Mehreen Gant MD  Performed by: Anesthesiologist  Patient Location:  OR    Block Placement: Post Induction  Site Identification: real time ultrasound guided and image stored and retrievable    Block site/laterality marked before start: site marked  Reason for Block: at surgeon's request and post-op pain management    Preanesthetic Checklist: 2 patient identifers, IV checked, risks and benefits discussed, monitors and equipment checked, pre-op evaluation, timeout performed, anesthesia consent, sterile technique used, no prohibitive neurological deficits and no local skin infection at insertion site      Procedure Details    Patient Position:   Prep: ChloraPrep    Monitoring:  Cardiac monitor, continuous pulse ox and blood pressure cuff  Anesthesia block type: serratus anterior. Laterality:  Bilateral  Injection Technique:  Single-shot    Needle    Needle Type:  Short-bevel and echogenic  Needle Length:  100 mm  Needle Localization:  Ultrasound guidance  Reason for Ultrasound Use: appropriate spread of the medication was noted in real time and no ultrasound evidence of intravascular and/or intraneural injection            Assessment    Injection Assessment:  Good spread noted, negative resistance, negative aspiration for heme, incremental injection and low pressure  Heart Rate Change: No    - Patient tolerated block procedure well without evidence of immediate block related complications.      Medications      Additional Comments    Medication:  Ropivacaine 0.375% 20mL on each side, 40ml total.

## 2023-04-19 NOTE — DISCHARGE INSTRUCTIONS
Call for fevers, chills, erythema. May shower in 48 hours but no bathing. Leave steristrips in place. Record and empty JEROME drains daily. No heavy lifting or strenuous activity. You have been given a prescription for Steve Giuliana was given to you at: 7:45PM  Next dose no sooner than: 11:45PM   Take this medication as directed  This medication contains Tylenol (acetaminophen)  Do not take additional Tylenol while taking Beula Lan is a Narcotic and can be constipating or upset your stomach  Don't take Norco on an empty stomach  Drink plenty of water  Alcoholic beverages should be avoided while taking narcotics         Please empty your drains every _24__ hours as your doctor instructed:       1. Pinch off the tubing to the drain to prevent air entry to the wound. 2. Open the plug to the drain bulb. 3. Holding drain bulb upside down squeeze drainage into the measuring container    4. Compress the drain and hold it compressed while you replug the drain. 5. Measure the drainage and record below.     Record drainage every _24___  hours 4/19 4/20 4/21 4/22 4/23 4/24 4/25 4/26        Drain #1                Drain #2

## 2023-04-19 NOTE — OPERATIVE REPORT
[facility]  Op Note    Veto Butt Location: OR   CSN [id number] MRN [id number]   Admission Date 4/19/2023 Operation Date 4/19/2023   Attending Physician Josph Baumgarten, MD Operating Physician Yolande San MD     DATE OF OPERATION:  4/19/2023  PREOPERATIVE DIAGNOSIS: Right breast atypical lobular hyperplasia with atypical ductal hyperplasia  POSTOPERATIVE DIAGNOSIS: Right breast atypical lobular hyperplasia with atypical ductal hyperplasia  PROCEDURE PERFORMED: Bilateral nipple sparing mastectomy, injection of blue dye (right breast), right sentinel lymph node biopsy. ANESTHESIA: Gen    SURGEON:  Adalberto Sun M.D.  ASSISTANT:  Ruth Calderon PA-C    SPECIMEN:  1. Left breast mastectomy  2. Right breast mastectomy    3. Right sentinel lymph nodes  ESTIMATED BLOOD LOSS: 30 mL. COMPLICATIONS: None. INDICATIONS: The patient is a 42-year-old female who was found to have extensive microcalcifications of the right breast.  The microcalcifications extended through most of her breast.  Multiple biopsies revealed atypical lobular hyperplasia with atypical ductal hyperplasia. Because of the risk of finding significant disease and her increased risk overall of developing breast cancer in the future, the patient decided to undergo bilateral mastectomy, right sentinel lymph node biopsy, possible total right axillary lymph node dissection. The risks, benefits, alternatives were discussed in detail with the patient. Risks included but not limited to, seroma development, infection and bleeding as well as neurovascular injury under the arm, future lymphedema, and the possibility of having to return to the operating room if the initially negative sentinel lymph nodes were eventually positive on permanent stain. The patient is agreeable to proceed with the operation.   PROCEDURE: After informed consent was obtained, the patient was taken to the radiology suite where radionucleotide was injected into her right breast. She was then brought up to the operating room where general anesthesia was induced. 6 cc of 50% methylene blue was injected into the right breast. The breast was then massaged for 5 minutes. The patient's breasts and axilla were prepped and draped in the usual sterile fashion. Attention was then turned to the left breast.  An incision was made along the inframammary crease with a 15 blade scalpel. Cautery was used to obtain hemostasis. Careful dissection was then used to dissect tissue flaps going superiorly, medially, and laterally. Care was taken to preserve the overlying skin and nipple. The breast tissue was then grasped and excised off the underlying pectoralis muscle using cautery. The specimen was marked with a stitch in the axillary tail. Cautery was used to obtain hemostasis. The wound was irrigated with normal saline and then packed with a saline-moistened lap sponge. Attention was turned to the right breast.  In the same fashion, an incision was made along the inframammary crease with a 15 blade scalpel. Cautery was used to obtain hemostasis and then to continue the dissection of the tissue flaps going superiorly, medially, and laterally. Care was taken to preserve the overlying skin and nipple. Once near the axilla, the area of greatest radioactivity was identified. Cautery was used to continue the dissection through the clavipectoral fascia. At this point, blunt dissection was used to identify the first sentinel lymph node. This was grasped with an Allis. It was bluntly dissected free from the surrounding tissue. It was ultimately excised using cautery. An ex vivo count was taken, and the lymph node was sent off the field. The axilla was then interrogated, and another area of activity was noted. This lymph node was grasped with an Allis and bluntly dissected free. Further interrogation of the axilla revealed no further hot spots.   The background count was found to be much less than 10% of the hottest lymph node. Attention was turned to the right breast.   Cautery was used to complete the dissection of the breast tissue free from the overlying skin and nipple tissue. Once the tissue flaps were raised, the breast tissue was grasped along its medial aspect and raised off the underlying fascia using cautery. During the dissection, pathology called into the room stating that the sentinel lymph nodes were negative for metastasis. Cautery was then used to obtain hemostasis. The wound was irrigated with normal saline and packed with a saline moistened lap sponge. A stitch was placed in the axillary tail of the breast, and the tissue was sent off the field. The patient remained in intubated, stable condition. Dr. Mare Leigh, who was present for the entire case, then completed his portion of the surgery.   This procedure was not performed to treat breast cancer through sentinel node biopsy     Talita Young MD

## 2023-04-25 ENCOUNTER — NURSE NAVIGATOR ENCOUNTER (OUTPATIENT)
Dept: HEMATOLOGY/ONCOLOGY | Facility: HOSPITAL | Age: 43
End: 2023-04-25

## 2023-04-25 ENCOUNTER — OFFICE VISIT (OUTPATIENT)
Dept: HEMATOLOGY/ONCOLOGY | Facility: HOSPITAL | Age: 43
End: 2023-04-25
Attending: SURGERY
Payer: COMMERCIAL

## 2023-04-25 ENCOUNTER — OFFICE VISIT (OUTPATIENT)
Facility: LOCATION | Age: 43
End: 2023-04-25

## 2023-04-25 VITALS
HEART RATE: 97 BPM | RESPIRATION RATE: 16 BRPM | SYSTOLIC BLOOD PRESSURE: 144 MMHG | TEMPERATURE: 98 F | WEIGHT: 130.81 LBS | BODY MASS INDEX: 21.79 KG/M2 | OXYGEN SATURATION: 100 % | DIASTOLIC BLOOD PRESSURE: 82 MMHG | HEIGHT: 65 IN

## 2023-04-25 DIAGNOSIS — N60.99 ATYPICAL DUCTAL HYPERPLASIA OF BREAST: ICD-10-CM

## 2023-04-25 DIAGNOSIS — C50.811 MALIGNANT NEOPLASM OF OVERLAPPING SITES OF RIGHT BREAST IN FEMALE, ESTROGEN RECEPTOR POSITIVE (HCC): Primary | ICD-10-CM

## 2023-04-25 DIAGNOSIS — Z17.0 MALIGNANT NEOPLASM OF OVERLAPPING SITES OF RIGHT BREAST IN FEMALE, ESTROGEN RECEPTOR POSITIVE (HCC): Primary | ICD-10-CM

## 2023-04-25 DIAGNOSIS — N60.99 ATYPICAL LOBULAR HYPERPLASIA (ALH) OF BREAST: ICD-10-CM

## 2023-04-25 PROCEDURE — 99211 OFF/OP EST MAY X REQ PHY/QHP: CPT

## 2023-04-25 PROCEDURE — 99024 POSTOP FOLLOW-UP VISIT: CPT | Performed by: SURGERY

## 2023-04-25 RX ORDER — IBUPROFEN 200 MG
400 TABLET ORAL EVERY 8 HOURS PRN
COMMUNITY

## 2023-04-25 NOTE — PROGRESS NOTES
Met with patient and her  during Dr. Layne Arambula clinic. Stated to the patient that her surgical pathology was discussed during the multidisciplinary conference today and it was recommended that she schedule a follow-up with Dr. Xochitl Keith and genetics counseling appointment. Scheduled patient with Dr. Xochitl Keith for Friday May 12th at the [facility] and scheduled genetics appointment on Tuesday May 2, 2023 at the [facility]. She thanked me for the assistance and was provided with the breast nurse navigators contact information and was encouraged to phone with any other questions or concerns.

## 2023-04-26 PROBLEM — Z17.0 MALIGNANT NEOPLASM OF OVERLAPPING SITES OF RIGHT BREAST IN FEMALE, ESTROGEN RECEPTOR POSITIVE  (HCC): Status: ACTIVE | Noted: 2023-04-26

## 2023-04-26 PROBLEM — C50.811 MALIGNANT NEOPLASM OF OVERLAPPING SITES OF RIGHT BREAST IN FEMALE, ESTROGEN RECEPTOR POSITIVE  (HCC): Status: ACTIVE | Noted: 2023-04-26

## 2023-04-26 PROBLEM — Z17.0 MALIGNANT NEOPLASM OF OVERLAPPING SITES OF RIGHT BREAST IN FEMALE, ESTROGEN RECEPTOR POSITIVE: Status: ACTIVE | Noted: 2023-04-26

## 2023-04-26 PROBLEM — Z17.0 MALIGNANT NEOPLASM OF OVERLAPPING SITES OF RIGHT BREAST IN FEMALE, ESTROGEN RECEPTOR POSITIVE (HCC): Status: ACTIVE | Noted: 2023-04-26

## 2023-04-26 PROBLEM — C50.811 MALIGNANT NEOPLASM OF OVERLAPPING SITES OF RIGHT BREAST IN FEMALE, ESTROGEN RECEPTOR POSITIVE: Status: ACTIVE | Noted: 2023-04-26

## 2023-04-26 PROBLEM — C50.811 MALIGNANT NEOPLASM OF OVERLAPPING SITES OF RIGHT BREAST IN FEMALE, ESTROGEN RECEPTOR POSITIVE (HCC): Status: ACTIVE | Noted: 2023-04-26

## 2023-04-27 ENCOUNTER — NURSE ONLY (OUTPATIENT)
Dept: SURGERY | Facility: CLINIC | Age: 43
End: 2023-04-27
Payer: COMMERCIAL

## 2023-04-27 PROCEDURE — 99024 POSTOP FOLLOW-UP VISIT: CPT | Performed by: SURGERY

## 2023-04-27 NOTE — PROGRESS NOTES
Nicholas Arellano is a 38year old female who presents today for a follow-up after bilateral nipple sparing mastectomy by Dr. Karyn Dc followed by immediate breast reconstruction wit tissue expander (535mL expander, no intraoperative fill) and acellular dermal matrix by Dr. Momo Gomez on 4/19/2023      She denies fever and chills. She denies nausea, vomiting, diarrhea or constipation. Her pain is controlled. Physical Exam     Surgical incisions are clean, dry, and intact. No erythema, no wound drainage. Breasts: Bilateral mastectomy skin is without erythema, ecchymosis or hematoma. Bilateral drains are intact with serosanguinous output. There were no vitals filed for this visit. Assessment and Plan     Nicholas Arellano is doing well s/p bilateral nipple sparing mastectomy by Dr. Karyn Dc followed by immediate breast reconstruction wit tissue expander (535mL expander, no intraoperative fill) and acellular dermal matrix by Dr. Momo Gomez on 4/19/2023    Bilateral drains were too high for removal. New drain dressings of biopatch and tegaderm was placed. patient was instructed to change dressings if wet or soiled. Bilateral breast steri-strips were removed. Incisions were cleaned with alcohol. No sign of wound drainage, wound healing delay or erythema. New steri-strips were placed. patient will call when drains are ready and follow up with Dr. Momo Gomez the week after. She reports she has to meet with Genetic and Oncology next week. Questions were answered. Patient understands.      Damion Armendariz RN  4/27/2023  12:59 PM

## 2023-05-01 ENCOUNTER — TELEPHONE (OUTPATIENT)
Dept: SURGERY | Facility: CLINIC | Age: 43
End: 2023-05-01

## 2023-05-01 NOTE — TELEPHONE ENCOUNTER
Spoke to patient regarding drain output. Her bilateral breast drains are within the parameter for removal. She [facility] Patient was appreciative of the call.

## 2023-05-02 ENCOUNTER — GENETICS ENCOUNTER (OUTPATIENT)
Dept: GENETICS | Facility: HOSPITAL | Age: 43
End: 2023-05-02
Attending: SURGERY
Payer: COMMERCIAL

## 2023-05-02 ENCOUNTER — NURSE ONLY (OUTPATIENT)
Dept: HEMATOLOGY/ONCOLOGY | Facility: HOSPITAL | Age: 43
End: 2023-05-02
Attending: SURGERY
Payer: COMMERCIAL

## 2023-05-02 DIAGNOSIS — Z17.0 MALIGNANT NEOPLASM OF RIGHT BREAST IN FEMALE, ESTROGEN RECEPTOR POSITIVE, UNSPECIFIED SITE OF BREAST (HCC): Primary | ICD-10-CM

## 2023-05-02 DIAGNOSIS — C50.911 MALIGNANT NEOPLASM OF RIGHT BREAST IN FEMALE, ESTROGEN RECEPTOR POSITIVE, UNSPECIFIED SITE OF BREAST (HCC): Primary | ICD-10-CM

## 2023-05-02 PROCEDURE — 36415 COLL VENOUS BLD VENIPUNCTURE: CPT

## 2023-05-02 PROCEDURE — 96040 HC GENETIC COUNSELING EA 30 MIN: CPT | Performed by: GENETIC COUNSELOR, MS

## 2023-05-03 ENCOUNTER — OFFICE VISIT (OUTPATIENT)
Dept: SURGERY | Facility: CLINIC | Age: 43
End: 2023-05-03
Payer: COMMERCIAL

## 2023-05-03 DIAGNOSIS — Z90.13 S/P BILATERAL MASTECTOMY: Primary | ICD-10-CM

## 2023-05-03 PROCEDURE — 99024 POSTOP FOLLOW-UP VISIT: CPT | Performed by: PHYSICIAN ASSISTANT

## 2023-05-05 PROBLEM — Z90.13 S/P BILATERAL MASTECTOMY: Status: ACTIVE | Noted: 2023-05-05

## 2023-05-05 NOTE — PROGRESS NOTES
Camille Holliday is a 38year old female who presents today for a follow-up after bilateral nipple sparing mastectomy by Dr. Maxi Rutledge followed by immediate breast reconstruction wit tissue expander (535mL expander, no intraoperative fill) and acellular dermal matrix by Dr. Naresh Marcano on 4/19/2023. She denies fever and chills. She denies nausea, vomiting, diarrhea or constipation. Her pain is controlled. She has been compliant with compression, activity restrictions, taking her antibiotic, drain care. Physical Exam     Breasts: Bilateral breast incisions clean, dry and intact without wound drainage or wound dehiscence. Bilateral mastectomy skin is without erythema, ecchymosis, necrosis. There is minimal hyperemia at a couple areas of expander prominence but no skin breakdown or extrusion. Bilateral nipples viable. No evidence of hematoma or seroma. Bilateral drain sites clean, dry and intact. Drain effluent serosanguineous. There were no vitals filed for this visit. Assessment and Plan     Camille Holliday is doing well s/p bilateral nipple sparing mastectomy by Dr. Maxi Rutledge followed by immediate breast reconstruction wit tissue expander (535mL expander, no intraoperative fill) and acellular dermal matrix by Dr. Naresh Marcano on 4/19/2023. Both breast drains were removed today due to low output. Neosporin, gauze, Tegaderm was placed. She tolerated this well. She can discontinue her oral antibiotic. She should continue with compression and activity restrictions. We reviewed reasons to contact our office. She will follow-up with Dr. Naresh Marcano in 1 to 2 weeks. Questions were answered. Patient understands.      Sharon Ortez  5/5/2023  8:42 AM

## 2023-05-12 ENCOUNTER — NURSE ONLY (OUTPATIENT)
Dept: SURGERY | Facility: CLINIC | Age: 43
End: 2023-05-12
Payer: COMMERCIAL

## 2023-05-12 ENCOUNTER — OFFICE VISIT (OUTPATIENT)
Dept: HEMATOLOGY/ONCOLOGY | Age: 43
End: 2023-05-12
Attending: SURGERY
Payer: COMMERCIAL

## 2023-05-12 VITALS
TEMPERATURE: 98 F | SYSTOLIC BLOOD PRESSURE: 123 MMHG | HEART RATE: 85 BPM | RESPIRATION RATE: 16 BRPM | WEIGHT: 127.31 LBS | BODY MASS INDEX: 21.21 KG/M2 | OXYGEN SATURATION: 98 % | HEIGHT: 65 IN | DIASTOLIC BLOOD PRESSURE: 72 MMHG

## 2023-05-12 DIAGNOSIS — C50.411 MALIGNANT NEOPLASM OF UPPER-OUTER QUADRANT OF RIGHT BREAST IN FEMALE, ESTROGEN RECEPTOR POSITIVE (HCC): Primary | ICD-10-CM

## 2023-05-12 DIAGNOSIS — Z17.0 MALIGNANT NEOPLASM OF UPPER-OUTER QUADRANT OF RIGHT BREAST IN FEMALE, ESTROGEN RECEPTOR POSITIVE (HCC): Primary | ICD-10-CM

## 2023-05-12 PROCEDURE — 99215 OFFICE O/P EST HI 40 MIN: CPT | Performed by: INTERNAL MEDICINE

## 2023-05-12 PROCEDURE — 99024 POSTOP FOLLOW-UP VISIT: CPT | Performed by: SURGERY

## 2023-05-12 RX ORDER — TAMOXIFEN CITRATE 20 MG/1
20 TABLET ORAL DAILY
Qty: 90 TABLET | Refills: 3 | Status: SHIPPED | OUTPATIENT
Start: 2023-05-12

## 2023-05-12 NOTE — PROGRESS NOTES
Patient is here for follow up for breast cancer after having bilateral mastectomy on 4/19. She is doing well. She is feeling back to normal.  She denies pain and has full range of motion. She was seen by plastic surgery this morning. She denies any fever, cough or shortness of breath.      Education Record    Learner:  Patient and Spouse    Disease / Diagnosis: breast cancer    Barriers / Limitations:  None   Comments:    Method:  Discussion   Comments:    General Topics:  Side effects and symptom management   Comments:    Outcome:  Shows understanding   Comments:

## 2023-05-14 PROBLEM — C50.411 MALIGNANT NEOPLASM OF UPPER-OUTER QUADRANT OF RIGHT BREAST IN FEMALE, ESTROGEN RECEPTOR POSITIVE: Status: ACTIVE | Noted: 2023-05-14

## 2023-05-14 PROBLEM — Z17.0 MALIGNANT NEOPLASM OF UPPER-OUTER QUADRANT OF RIGHT BREAST IN FEMALE, ESTROGEN RECEPTOR POSITIVE (HCC): Status: ACTIVE | Noted: 2023-05-14

## 2023-05-14 PROBLEM — Z17.0 MALIGNANT NEOPLASM OF UPPER-OUTER QUADRANT OF RIGHT BREAST IN FEMALE, ESTROGEN RECEPTOR POSITIVE  (HCC): Status: ACTIVE | Noted: 2023-05-14

## 2023-05-14 PROBLEM — C50.411 MALIGNANT NEOPLASM OF UPPER-OUTER QUADRANT OF RIGHT BREAST IN FEMALE, ESTROGEN RECEPTOR POSITIVE  (HCC): Status: ACTIVE | Noted: 2023-05-14

## 2023-05-14 PROBLEM — Z17.0 MALIGNANT NEOPLASM OF UPPER-OUTER QUADRANT OF RIGHT BREAST IN FEMALE, ESTROGEN RECEPTOR POSITIVE: Status: ACTIVE | Noted: 2023-05-14

## 2023-05-14 PROBLEM — C50.411 MALIGNANT NEOPLASM OF UPPER-OUTER QUADRANT OF RIGHT BREAST IN FEMALE, ESTROGEN RECEPTOR POSITIVE (HCC): Status: ACTIVE | Noted: 2023-05-14

## 2023-05-18 ENCOUNTER — GENETICS ENCOUNTER (OUTPATIENT)
Dept: HEMATOLOGY/ONCOLOGY | Facility: HOSPITAL | Age: 43
End: 2023-05-18

## 2023-05-18 NOTE — PROGRESS NOTES
Referring Provider:                    Myrna Rainey MD     Additional Provider(s):              MD Alyssa Sharma DO    Reason for Referral:  Dain Villanueva had genetic testing performed on 05/02/23 because of a personal history of breast cancer at age 38. Genetic Testing Result:  No known pathogenic variants were found in the following 19 genes: MIRIAM, BARD1, BRCA1, BRCA2, BRIP1, CDH1, CHEK2, MLH1, MSH2 (including EPCAM rearrangement testing), MSH6, NF1, PALB2, PMS2, PTEN, RAD51C, RAD51D, STK11, and TP53. Please refer to the report from [facility] for additional testing information. These results were discussed with Ms. Kiera Muniz by phone on 05/18/23. Summary and Plan:  These results indicate that it is unlikely that Ms. Kiera Muniz has a pathogenic variant in any of the genes listed above. The limitations of the testing include the chance that a pathogenic variant in a gene other than those included in this analysis might be the cause of cancer in Ms. Kiera Muniz or her relatives. I encouraged Ms. Kiera Muniz to contact me on an annual basis to see if there have been any updates in genetic testing that would apply to her or to inform me if there are any changes to the family history. In the meantime, Ms. Kiera Muniz and her relatives should speak with their physicians to discuss recommended medical management according to their personal and family history.     Cc:  Dain Villanueva

## 2023-05-22 ENCOUNTER — OFFICE VISIT (OUTPATIENT)
Dept: SURGERY | Facility: CLINIC | Age: 43
End: 2023-05-22
Payer: COMMERCIAL

## 2023-05-22 DIAGNOSIS — Z90.13 S/P BILATERAL MASTECTOMY: Primary | ICD-10-CM

## 2023-05-22 PROCEDURE — 99024 POSTOP FOLLOW-UP VISIT: CPT | Performed by: PHYSICIAN ASSISTANT

## 2023-06-02 ENCOUNTER — OFFICE VISIT (OUTPATIENT)
Dept: SURGERY | Facility: CLINIC | Age: 43
End: 2023-06-02
Payer: COMMERCIAL

## 2023-06-02 DIAGNOSIS — Z90.13 S/P BILATERAL MASTECTOMY: Primary | ICD-10-CM

## 2023-06-02 PROCEDURE — 99024 POSTOP FOLLOW-UP VISIT: CPT | Performed by: SURGERY

## 2023-06-02 NOTE — PROGRESS NOTES
Ana Travis is a 38year old female who presents today for a follow-up. She has a 12 cm base ammeter tissue expander filled to 150 cc. Physical Examination:  Breasts: Bilateral breast incisions are clean dry and intact. Procedure: Bilateral breast were sterilely expanded with 60 cc of saline to a total of 210 cc. No seroma fluid was encountered. Assessment and Plan:  Patient is doing well. She will follow-up in 1 week for further expansion.

## 2023-06-12 ENCOUNTER — OFFICE VISIT (OUTPATIENT)
Dept: SURGERY | Facility: CLINIC | Age: 43
End: 2023-06-12
Payer: COMMERCIAL

## 2023-06-12 DIAGNOSIS — Z90.13 S/P BILATERAL MASTECTOMY: Primary | ICD-10-CM

## 2023-06-12 PROCEDURE — 99024 POSTOP FOLLOW-UP VISIT: CPT | Performed by: PHYSICIAN ASSISTANT

## 2023-06-19 ENCOUNTER — OFFICE VISIT (OUTPATIENT)
Dept: SURGERY | Facility: CLINIC | Age: 43
End: 2023-06-19
Payer: COMMERCIAL

## 2023-06-19 DIAGNOSIS — Z90.13 S/P BILATERAL MASTECTOMY: Primary | ICD-10-CM

## 2023-06-19 PROCEDURE — 99024 POSTOP FOLLOW-UP VISIT: CPT | Performed by: PHYSICIAN ASSISTANT

## 2023-07-07 ENCOUNTER — OFFICE VISIT (OUTPATIENT)
Dept: SURGERY | Facility: CLINIC | Age: 43
End: 2023-07-07
Payer: COMMERCIAL

## 2023-07-07 DIAGNOSIS — Z90.13 S/P BILATERAL MASTECTOMY: Primary | ICD-10-CM

## 2023-07-07 PROCEDURE — 99024 POSTOP FOLLOW-UP VISIT: CPT | Performed by: SURGERY

## 2023-07-07 NOTE — PROGRESS NOTES
Surgeon:         Dr. Annamaria Zavala                                        Tel:         [phone]                                  Fax:        [phone]    Surgery/Procedure: Removal of bilateral breast tissue expanders, Placement of bilateral breast implants with autologous fat grafting 2.5 hours, outpatient, general anesthesia    Dx Code: N60.99    Hospital:  [facility]: [address] [place] [address] [address], [place]           [phone]  [facility]: [address], [facility], [facility]               [phone]    1. Someone will need to drive you to and from the hospital if your procedure is outpatient. 2.Do not drink alcohol or smoke 24 hours prior to your procedure. 3. Bring a picture ID and your insurance card. 4. You will be contacted by the hospital the day before to confirm the procedure time and location. 5. The hospital will also contact you approximately one week before surgery to schedule your COVID test (only if surgery is inpatient/overnight stay). Please inform us if you develop any Covid-19 like symptoms, test positive or have been exposed for Covid- 19 prior to surgery. 6. Do not take any herbal supplements or blood thinners at least one week before your procedure/surgery. This includes NSAID's (aspirin, baby aspirin, Motrin, Ibuprofen, Aleve, Advil, Naproxen, etc), Plavix, fish oil, vitamin E, turmeric, CoQ10, or green tea supplements, etc. *TYLENOL or acetaminophen is ok to take*    *Stop taking Tamoxifen 2 weeks prior to surgery and hold for 2 weeks after surgery. 7. PRE-OPERATIVE TESTING: History and physical with medical clearance is REQUIRED within 30 days of the surgery date and is mandatory per Dr. Chadd Torrez. *If this is not done, your surgery will be postponed*  MEDICAL CLEARANCE WITH DR. Malin  CBC  CMP  EKG    8.  Please inform us if you start or change any medications at least one week before surgery (ex: blood thinners, weight loss medications, diabetic medications, herbal supplements, etc)    Consent obtained  Photos taken on 7/7/2023

## 2023-07-07 NOTE — PROGRESS NOTES
Veronica Ca is a 38year old female who presents today for a follow-up. She currently has a 12 cm base ammeter tissue expander filled to 330 cc. She is happy with her current fill volume. Physical Examination:  Breasts: Bilateral breast incisions are clean dry and intact. Good shape and symmetry is noted. Abdomen: A small amount of central abdominal and flank lipodystrophy is noted. There are no palpable hernias noted. Assessment and Plan:  We discussed the plan for the second stage which will include removal of bilateral breast tissue expanders, placement of smooth, round, silicone implants, and fat grafting to bilateral reconstructed breasts. The nature of the procedure was reviewed with the patient. We discussed the risks of surgery including but not limited to bleeding, infection, scarring, delayed wound healing, asymmetry, implant malposition, implant infection or extrusion requiring removal, ALCL, capsular contracture, hypertrophic scarring or keloid, injury to intra-abdominal structures, contour abnormalities, cysts or calcifications requiring biopsy, and need for further surgery. We reviewed the expected postoperative course including possible need for drains, as well as need for activity limitation and compression. Multiple questions were answered the patient's satisfaction. No guarantees as to outcome were offered. The patient expresses understanding and wishes to proceed.

## 2023-07-24 DIAGNOSIS — N60.99 ATYPICAL LOBULAR HYPERPLASIA OF BREAST: Primary | ICD-10-CM

## 2023-07-27 ENCOUNTER — TELEPHONE (OUTPATIENT)
Dept: FAMILY MEDICINE CLINIC | Facility: CLINIC | Age: 43
End: 2023-07-27

## 2023-07-27 NOTE — TELEPHONE ENCOUNTER
- Left message to schedule preop surgery 01/10/2024 with Refugio Calvert PH. [phone]  Fax [phone]    Procedure Removal of Bilat Breast tissue expanders, Placement of Vilat breast implants with autologous fat grafting 2.5 hours, outpatient, general anesthisia    Clearance paperwork placed in work in progress.

## 2023-08-16 ENCOUNTER — TELEPHONE (OUTPATIENT)
Dept: SURGERY | Facility: CLINIC | Age: 43
End: 2023-08-16

## 2023-08-16 DIAGNOSIS — Z90.13 S/P BILATERAL MASTECTOMY: Primary | ICD-10-CM

## 2023-08-16 DIAGNOSIS — N60.99 ATYPICAL DUCTAL HYPERPLASIA OF BREAST: ICD-10-CM

## 2023-08-16 NOTE — TELEPHONE ENCOUNTER
Called patient to offer sooner surgery date of 8/23/23 at [facility]. Patient accepted. We discussed pre-op clearance and instructions. She was appreciative of the call.

## 2023-08-17 RX ORDER — HEPARIN SODIUM 5000 [USP'U]/ML
5000 INJECTION, SOLUTION INTRAVENOUS; SUBCUTANEOUS ONCE
Status: CANCELLED | OUTPATIENT
Start: 2023-08-17 | End: 2023-08-17

## 2023-08-19 ENCOUNTER — LAB ENCOUNTER (OUTPATIENT)
Dept: LAB | Age: 43
End: 2023-08-19
Attending: FAMILY MEDICINE
Payer: COMMERCIAL

## 2023-08-19 ENCOUNTER — OFFICE VISIT (OUTPATIENT)
Dept: FAMILY MEDICINE CLINIC | Facility: CLINIC | Age: 43
End: 2023-08-19
Payer: COMMERCIAL

## 2023-08-19 VITALS
BODY MASS INDEX: 21.07 KG/M2 | TEMPERATURE: 97 F | WEIGHT: 128 LBS | SYSTOLIC BLOOD PRESSURE: 120 MMHG | RESPIRATION RATE: 16 BRPM | DIASTOLIC BLOOD PRESSURE: 74 MMHG | HEART RATE: 74 BPM | OXYGEN SATURATION: 98 % | HEIGHT: 65.5 IN

## 2023-08-19 DIAGNOSIS — Z01.818 PRE-OP EXAM: ICD-10-CM

## 2023-08-19 DIAGNOSIS — Z01.818 PRE-OP EXAM: Primary | ICD-10-CM

## 2023-08-19 LAB
ALBUMIN SERPL-MCNC: 3.9 G/DL (ref 3.4–5)
ALBUMIN/GLOB SERPL: 1.1 {RATIO} (ref 1–2)
ALP LIVER SERPL-CCNC: 50 U/L
ALT SERPL-CCNC: 20 U/L
ANION GAP SERPL CALC-SCNC: 5 MMOL/L (ref 0–18)
AST SERPL-CCNC: 12 U/L (ref 15–37)
ATRIAL RATE: 60 BPM
BASOPHILS # BLD AUTO: 0.04 X10(3) UL (ref 0–0.2)
BASOPHILS NFR BLD AUTO: 0.8 %
BILIRUB SERPL-MCNC: 0.6 MG/DL (ref 0.1–2)
BUN BLD-MCNC: 17 MG/DL (ref 7–18)
CALCIUM BLD-MCNC: 9.4 MG/DL (ref 8.5–10.1)
CHLORIDE SERPL-SCNC: 109 MMOL/L (ref 98–112)
CO2 SERPL-SCNC: 25 MMOL/L (ref 21–32)
CREAT BLD-MCNC: 0.8 MG/DL
EGFRCR SERPLBLD CKD-EPI 2021: 93 ML/MIN/1.73M2 (ref 60–?)
EOSINOPHIL # BLD AUTO: 0.12 X10(3) UL (ref 0–0.7)
EOSINOPHIL NFR BLD AUTO: 2.4 %
ERYTHROCYTE [DISTWIDTH] IN BLOOD BY AUTOMATED COUNT: 16.9 %
FASTING STATUS PATIENT QL REPORTED: YES
GLOBULIN PLAS-MCNC: 3.6 G/DL (ref 2.8–4.4)
GLUCOSE BLD-MCNC: 73 MG/DL (ref 70–99)
HCT VFR BLD AUTO: 38.5 %
HGB BLD-MCNC: 12.4 G/DL
IMM GRANULOCYTES # BLD AUTO: 0.01 X10(3) UL (ref 0–1)
IMM GRANULOCYTES NFR BLD: 0.2 %
LYMPHOCYTES # BLD AUTO: 1.73 X10(3) UL (ref 1–4)
LYMPHOCYTES NFR BLD AUTO: 34.8 %
MCH RBC QN AUTO: 27.7 PG (ref 26–34)
MCHC RBC AUTO-ENTMCNC: 32.2 G/DL (ref 31–37)
MCV RBC AUTO: 86.1 FL
MONOCYTES # BLD AUTO: 0.55 X10(3) UL (ref 0.1–1)
MONOCYTES NFR BLD AUTO: 11.1 %
NEUTROPHILS # BLD AUTO: 2.52 X10 (3) UL (ref 1.5–7.7)
NEUTROPHILS # BLD AUTO: 2.52 X10(3) UL (ref 1.5–7.7)
NEUTROPHILS NFR BLD AUTO: 50.7 %
OSMOLALITY SERPL CALC.SUM OF ELEC: 288 MOSM/KG (ref 275–295)
P AXIS: 65 DEGREES
P-R INTERVAL: 156 MS
PLATELET # BLD AUTO: 279 10(3)UL (ref 150–450)
POTASSIUM SERPL-SCNC: 4 MMOL/L (ref 3.5–5.1)
PROT SERPL-MCNC: 7.5 G/DL (ref 6.4–8.2)
Q-T INTERVAL: 436 MS
QRS DURATION: 68 MS
QTC CALCULATION (BEZET): 436 MS
R AXIS: 70 DEGREES
RBC # BLD AUTO: 4.47 X10(6)UL
SODIUM SERPL-SCNC: 139 MMOL/L (ref 136–145)
T AXIS: 59 DEGREES
VENTRICULAR RATE: 60 BPM
WBC # BLD AUTO: 5 X10(3) UL (ref 4–11)

## 2023-08-19 PROCEDURE — 80053 COMPREHEN METABOLIC PANEL: CPT

## 2023-08-19 PROCEDURE — 85025 COMPLETE CBC W/AUTO DIFF WBC: CPT

## 2023-08-19 PROCEDURE — 36415 COLL VENOUS BLD VENIPUNCTURE: CPT

## 2023-08-19 PROCEDURE — 3074F SYST BP LT 130 MM HG: CPT | Performed by: FAMILY MEDICINE

## 2023-08-19 PROCEDURE — 93000 ELECTROCARDIOGRAM COMPLETE: CPT | Performed by: FAMILY MEDICINE

## 2023-08-19 PROCEDURE — 3008F BODY MASS INDEX DOCD: CPT | Performed by: FAMILY MEDICINE

## 2023-08-19 PROCEDURE — 99214 OFFICE O/P EST MOD 30 MIN: CPT | Performed by: FAMILY MEDICINE

## 2023-08-19 PROCEDURE — 3078F DIAST BP <80 MM HG: CPT | Performed by: FAMILY MEDICINE

## 2023-08-22 ENCOUNTER — TELEPHONE (OUTPATIENT)
Dept: FAMILY MEDICINE CLINIC | Facility: CLINIC | Age: 43
End: 2023-08-22

## 2023-08-22 NOTE — TELEPHONE ENCOUNTER
Faxed pre-surgical clearance office visit note, EKG, and labs to Dr. Macario Bajwa at [address] [place] [address] at [phone]. Pts surgery is 8/23/23. Copy of medical clearance request letter already in chart, as is EKG and labs. Gave original to April. Confirmation recd.

## 2023-08-23 ENCOUNTER — ANESTHESIA (OUTPATIENT)
Dept: SURGERY | Facility: HOSPITAL | Age: 43
End: 2023-08-23
Payer: COMMERCIAL

## 2023-08-23 ENCOUNTER — ANESTHESIA EVENT (OUTPATIENT)
Dept: SURGERY | Facility: HOSPITAL | Age: 43
End: 2023-08-23
Payer: COMMERCIAL

## 2023-08-23 ENCOUNTER — HOSPITAL ENCOUNTER (OUTPATIENT)
Facility: HOSPITAL | Age: 43
Setting detail: HOSPITAL OUTPATIENT SURGERY
Discharge: HOME | End: 2023-08-23
Attending: SURGERY | Admitting: SURGERY
Payer: COMMERCIAL

## 2023-08-23 VITALS
SYSTOLIC BLOOD PRESSURE: 105 MMHG | TEMPERATURE: 97 F | WEIGHT: 129.19 LBS | DIASTOLIC BLOOD PRESSURE: 62 MMHG | HEIGHT: 65.5 IN | RESPIRATION RATE: 18 BRPM | HEART RATE: 88 BPM | BODY MASS INDEX: 21.27 KG/M2 | OXYGEN SATURATION: 99 %

## 2023-08-23 DIAGNOSIS — Z90.13 S/P BILATERAL MASTECTOMY: Primary | ICD-10-CM

## 2023-08-23 DIAGNOSIS — N60.99 ATYPICAL LOBULAR HYPERPLASIA OF BREAST: ICD-10-CM

## 2023-08-23 LAB — B-HCG UR QL: NEGATIVE

## 2023-08-23 PROCEDURE — 0HRV0JZ REPLACEMENT OF BILATERAL BREAST WITH SYNTHETIC SUBSTITUTE, OPEN APPROACH: ICD-10-PCS | Performed by: SURGERY

## 2023-08-23 PROCEDURE — 0HPU0NZ REMOVAL OF TISSUE EXPANDER FROM LEFT BREAST, OPEN APPROACH: ICD-10-PCS | Performed by: SURGERY

## 2023-08-23 PROCEDURE — 81025 URINE PREGNANCY TEST: CPT

## 2023-08-23 PROCEDURE — 0JD83ZZ EXTRACTION OF ABDOMEN SUBCUTANEOUS TISSUE AND FASCIA, PERCUTANEOUS APPROACH: ICD-10-PCS | Performed by: SURGERY

## 2023-08-23 PROCEDURE — 0HUV37Z SUPPLEMENT BILATERAL BREAST WITH AUTOLOGOUS TISSUE SUBSTITUTE, PERCUTANEOUS APPROACH: ICD-10-PCS | Performed by: SURGERY

## 2023-08-23 PROCEDURE — 88305 TISSUE EXAM BY PATHOLOGIST: CPT | Performed by: SURGERY

## 2023-08-23 PROCEDURE — 0HPT0NZ REMOVAL OF TISSUE EXPANDER FROM RIGHT BREAST, OPEN APPROACH: ICD-10-PCS | Performed by: SURGERY

## 2023-08-23 DEVICE — IMPLANTABLE DEVICE: Type: IMPLANTABLE DEVICE | Site: BREAST | Status: FUNCTIONAL

## 2023-08-23 RX ORDER — HYDROMORPHONE HYDROCHLORIDE 1 MG/ML
0.2 INJECTION, SOLUTION INTRAMUSCULAR; INTRAVENOUS; SUBCUTANEOUS EVERY 5 MIN PRN
Status: DISCONTINUED | OUTPATIENT
Start: 2023-08-23 | End: 2023-08-23

## 2023-08-23 RX ORDER — PHENYLEPHRINE HCL 10 MG/ML
VIAL (ML) INJECTION AS NEEDED
Status: DISCONTINUED | OUTPATIENT
Start: 2023-08-23 | End: 2023-08-23 | Stop reason: SURG

## 2023-08-23 RX ORDER — DOCUSATE SODIUM 100 MG/1
100 CAPSULE, LIQUID FILLED ORAL 2 TIMES DAILY
Qty: 30 CAPSULE | Refills: 1 | Status: SHIPPED | OUTPATIENT
Start: 2023-08-23

## 2023-08-23 RX ORDER — HYDROCODONE BITARTRATE AND ACETAMINOPHEN 5; 325 MG/1; MG/1
1-2 TABLET ORAL EVERY 4 HOURS PRN
Qty: 30 TABLET | Refills: 0 | Status: SHIPPED | OUTPATIENT
Start: 2023-08-23

## 2023-08-23 RX ORDER — ACETAMINOPHEN 500 MG
1000 TABLET ORAL ONCE AS NEEDED
Status: DISCONTINUED | OUTPATIENT
Start: 2023-08-23 | End: 2023-08-23

## 2023-08-23 RX ORDER — HYDROMORPHONE HYDROCHLORIDE 1 MG/ML
0.6 INJECTION, SOLUTION INTRAMUSCULAR; INTRAVENOUS; SUBCUTANEOUS EVERY 5 MIN PRN
Status: DISCONTINUED | OUTPATIENT
Start: 2023-08-23 | End: 2023-08-23

## 2023-08-23 RX ORDER — ONDANSETRON 4 MG/1
4 TABLET, FILM COATED ORAL EVERY 8 HOURS PRN
Qty: 12 TABLET | Refills: 0 | Status: SHIPPED | OUTPATIENT
Start: 2023-08-23

## 2023-08-23 RX ORDER — MIDAZOLAM HYDROCHLORIDE 1 MG/ML
INJECTION INTRAMUSCULAR; INTRAVENOUS AS NEEDED
Status: DISCONTINUED | OUTPATIENT
Start: 2023-08-23 | End: 2023-08-23 | Stop reason: SURG

## 2023-08-23 RX ORDER — ROCURONIUM BROMIDE 10 MG/ML
INJECTION, SOLUTION INTRAVENOUS AS NEEDED
Status: DISCONTINUED | OUTPATIENT
Start: 2023-08-23 | End: 2023-08-23 | Stop reason: SURG

## 2023-08-23 RX ORDER — SCOLOPAMINE TRANSDERMAL SYSTEM 1 MG/1
1 PATCH, EXTENDED RELEASE TRANSDERMAL
Status: DISCONTINUED | OUTPATIENT
Start: 2023-08-23 | End: 2023-08-23 | Stop reason: HOSPADM

## 2023-08-23 RX ORDER — SODIUM CHLORIDE, SODIUM LACTATE, POTASSIUM CHLORIDE, CALCIUM CHLORIDE 600; 310; 30; 20 MG/100ML; MG/100ML; MG/100ML; MG/100ML
INJECTION, SOLUTION INTRAVENOUS CONTINUOUS
Status: DISCONTINUED | OUTPATIENT
Start: 2023-08-23 | End: 2023-08-23

## 2023-08-23 RX ORDER — PROCHLORPERAZINE EDISYLATE 5 MG/ML
5 INJECTION INTRAMUSCULAR; INTRAVENOUS EVERY 8 HOURS PRN
Status: DISCONTINUED | OUTPATIENT
Start: 2023-08-23 | End: 2023-08-23

## 2023-08-23 RX ORDER — NALOXONE HYDROCHLORIDE 0.4 MG/ML
0.08 INJECTION, SOLUTION INTRAMUSCULAR; INTRAVENOUS; SUBCUTANEOUS AS NEEDED
Status: DISCONTINUED | OUTPATIENT
Start: 2023-08-23 | End: 2023-08-23

## 2023-08-23 RX ORDER — DIPHENHYDRAMINE HYDROCHLORIDE 50 MG/ML
INJECTION INTRAMUSCULAR; INTRAVENOUS AS NEEDED
Status: DISCONTINUED | OUTPATIENT
Start: 2023-08-23 | End: 2023-08-23 | Stop reason: SURG

## 2023-08-23 RX ORDER — KETOROLAC TROMETHAMINE 30 MG/ML
INJECTION, SOLUTION INTRAMUSCULAR; INTRAVENOUS AS NEEDED
Status: DISCONTINUED | OUTPATIENT
Start: 2023-08-23 | End: 2023-08-23 | Stop reason: SURG

## 2023-08-23 RX ORDER — ONDANSETRON 2 MG/ML
INJECTION INTRAMUSCULAR; INTRAVENOUS AS NEEDED
Status: DISCONTINUED | OUTPATIENT
Start: 2023-08-23 | End: 2023-08-23 | Stop reason: SURG

## 2023-08-23 RX ORDER — CEPHALEXIN 500 MG/1
500 CAPSULE ORAL 4 TIMES DAILY
Qty: 20 CAPSULE | Refills: 0 | Status: SHIPPED | OUTPATIENT
Start: 2023-08-23 | End: 2023-08-28

## 2023-08-23 RX ORDER — LIDOCAINE HYDROCHLORIDE AND EPINEPHRINE 10; 10 MG/ML; UG/ML
INJECTION, SOLUTION INFILTRATION; PERINEURAL AS NEEDED
Status: DISCONTINUED | OUTPATIENT
Start: 2023-08-23 | End: 2023-08-23 | Stop reason: HOSPADM

## 2023-08-23 RX ORDER — CEFAZOLIN SODIUM/WATER 2 G/20 ML
2 SYRINGE (ML) INTRAVENOUS ONCE
Status: COMPLETED | OUTPATIENT
Start: 2023-08-23 | End: 2023-08-23

## 2023-08-23 RX ORDER — HYDROCODONE BITARTRATE AND ACETAMINOPHEN 5; 325 MG/1; MG/1
2 TABLET ORAL ONCE AS NEEDED
Status: DISCONTINUED | OUTPATIENT
Start: 2023-08-23 | End: 2023-08-23

## 2023-08-23 RX ORDER — EPHEDRINE SULFATE 50 MG/ML
INJECTION INTRAVENOUS AS NEEDED
Status: DISCONTINUED | OUTPATIENT
Start: 2023-08-23 | End: 2023-08-23 | Stop reason: SURG

## 2023-08-23 RX ORDER — ACETAMINOPHEN 500 MG
1000 TABLET ORAL ONCE
Status: DISCONTINUED | OUTPATIENT
Start: 2023-08-23 | End: 2023-08-23 | Stop reason: HOSPADM

## 2023-08-23 RX ORDER — HYDROMORPHONE HYDROCHLORIDE 1 MG/ML
0.4 INJECTION, SOLUTION INTRAMUSCULAR; INTRAVENOUS; SUBCUTANEOUS EVERY 5 MIN PRN
Status: DISCONTINUED | OUTPATIENT
Start: 2023-08-23 | End: 2023-08-23

## 2023-08-23 RX ORDER — DEXAMETHASONE SODIUM PHOSPHATE 4 MG/ML
VIAL (ML) INJECTION AS NEEDED
Status: DISCONTINUED | OUTPATIENT
Start: 2023-08-23 | End: 2023-08-23 | Stop reason: SURG

## 2023-08-23 RX ORDER — SCOLOPAMINE TRANSDERMAL SYSTEM 1 MG/1
1 PATCH, EXTENDED RELEASE TRANSDERMAL ONCE
Status: DISCONTINUED | OUTPATIENT
Start: 2023-08-23 | End: 2023-08-23 | Stop reason: HOSPADM

## 2023-08-23 RX ORDER — HYDROCODONE BITARTRATE AND ACETAMINOPHEN 5; 325 MG/1; MG/1
1 TABLET ORAL ONCE AS NEEDED
Status: DISCONTINUED | OUTPATIENT
Start: 2023-08-23 | End: 2023-08-23

## 2023-08-23 RX ORDER — ONDANSETRON 2 MG/ML
4 INJECTION INTRAMUSCULAR; INTRAVENOUS EVERY 6 HOURS PRN
Status: DISCONTINUED | OUTPATIENT
Start: 2023-08-23 | End: 2023-08-23

## 2023-08-23 RX ORDER — LIDOCAINE HYDROCHLORIDE 10 MG/ML
INJECTION, SOLUTION EPIDURAL; INFILTRATION; INTRACAUDAL; PERINEURAL AS NEEDED
Status: DISCONTINUED | OUTPATIENT
Start: 2023-08-23 | End: 2023-08-23 | Stop reason: SURG

## 2023-08-23 RX ADMIN — PHENYLEPHRINE HCL 200 MCG: 10 MG/ML VIAL (ML) INJECTION at 14:52:00

## 2023-08-23 RX ADMIN — KETOROLAC TROMETHAMINE 30 MG: 30 INJECTION, SOLUTION INTRAMUSCULAR; INTRAVENOUS at 15:13:00

## 2023-08-23 RX ADMIN — DIPHENHYDRAMINE HYDROCHLORIDE 12.5 MG: 50 INJECTION INTRAMUSCULAR; INTRAVENOUS at 14:15:00

## 2023-08-23 RX ADMIN — CEFAZOLIN SODIUM/WATER 2 G: 2 G/20 ML SYRINGE (ML) INTRAVENOUS at 13:45:00

## 2023-08-23 RX ADMIN — DEXAMETHASONE SODIUM PHOSPHATE 8 MG: 4 MG/ML VIAL (ML) INJECTION at 13:45:00

## 2023-08-23 RX ADMIN — EPHEDRINE SULFATE 10 MG: 50 INJECTION INTRAVENOUS at 14:06:00

## 2023-08-23 RX ADMIN — ROCURONIUM BROMIDE 50 MG: 10 INJECTION, SOLUTION INTRAVENOUS at 13:40:00

## 2023-08-23 RX ADMIN — LIDOCAINE HYDROCHLORIDE 50 MG: 10 INJECTION, SOLUTION EPIDURAL; INFILTRATION; INTRACAUDAL; PERINEURAL at 13:39:00

## 2023-08-23 RX ADMIN — MIDAZOLAM HYDROCHLORIDE 2 MG: 1 INJECTION INTRAMUSCULAR; INTRAVENOUS at 14:15:00

## 2023-08-23 RX ADMIN — SODIUM CHLORIDE, SODIUM LACTATE, POTASSIUM CHLORIDE, CALCIUM CHLORIDE: 600; 310; 30; 20 INJECTION, SOLUTION INTRAVENOUS at 13:37:00

## 2023-08-23 RX ADMIN — PHENYLEPHRINE HCL 200 MCG: 10 MG/ML VIAL (ML) INJECTION at 15:06:00

## 2023-08-23 RX ADMIN — ONDANSETRON 4 MG: 2 INJECTION INTRAMUSCULAR; INTRAVENOUS at 15:13:00

## 2023-08-23 NOTE — DISCHARGE INSTRUCTIONS
Call for fevers, chills, redness. May shower in 48 hours but no bathing. Leave steristrips in place. Wear abdominal binder and sports/surgical bra at all times. No heavy lifting or strenuous activity. You received a drug called Toradol which is an Anti Inflammatory at: 3:15PM  If you are allowed to take Anti inflammatories (like Motrin, Aleve or Ibuprofen, Advil), do not take for six hours after Toradol dose.   Please report any suspected allergic reactions or bleeding issues to your doctor

## 2023-08-23 NOTE — ANESTHESIA POSTPROCEDURE EVALUATION
[address] [facility] Patient Status:  Hospital Outpatient Surgery   Age/Gender 38year old female MRN [id number]   [facility] SURGERY Attending Hawa Gramajo MD   Hosp Day # 0 PCP Abbey Rojo DO       Anesthesia Post-op Note    Removal of bilateral breast tissue expanders, Placement of bilateral breast implants with autologous fat grafting    Procedure Summary       Date: 08/23/23 Room / Location: [facility] MAIN OR  / [facility] MAIN OR    Anesthesia Start: 2330 Anesthesia Stop: 1648    Procedures:       Removal of bilateral breast tissue expanders, Placement of bilateral breast implants with autologous fat grafting (Bilateral: Breast)      . (Bilateral) Diagnosis:       Atypical lobular hyperplasia of breast      (Atypical lobular hyperplasia of breast [N60.99])    Surgeons: Hawa Gramajo MD Anesthesiologist: Melanie Camacho MD    Anesthesia Type: general ASA Status: 1            Anesthesia Type: general    Vitals Value Taken Time   BP 99/80 08/23/23 1543   Temp 97.7 08/23/23 1543   Pulse 83 08/23/23 1543   Resp 20 08/23/23 1543   SpO2 100 08/23/23 1543       Patient Location: PACU    Anesthesia Type: general    Airway Patency: patent    Postop Pain Control: adequate    Mental Status: mildly sedated but able to meaningfully participate in the post-anesthesia evaluation    Nausea/Vomiting: none    Cardiopulmonary/Hydration status: stable euvolemic    Complications: no apparent anesthesia related complications    Postop vital signs: stable    Dental Exam: Unchanged from Preop    Patient to be discharged from PACU when criteria met.

## 2023-08-23 NOTE — BRIEF OP NOTE
Pre-Operative Diagnosis: Atypical lobular hyperplasia of breast [N60.99]     Post-Operative Diagnosis: Atypical lobular hyperplasia of breast [N60.99]      Procedure Performed:   Removal of bilateral breast tissue expanders, Placement of bilateral breast implants with autologous fat grafting    Surgeon(s) and Role:     Zi Hernandez MD - Primary    Assistant(s):   Sergio Melchor     Surgical Findings: Nl     Specimen: Bilateral breast tissue     Estimated Blood Loss: Blood Output: 5 mL (8/23/2023  3:23 PM)    Margaux Obrien MD  8/23/2023  3:28 PM

## 2023-08-23 NOTE — ANESTHESIA PROCEDURE NOTES
Airway  Date/Time: 8/23/2023 1:43 PM  Urgency: Elective      General Information and Staff    Patient location during procedure: OR  Anesthesiologist: Deysi Blank MD  Resident/CRNA: Chuck Aguilar CRNA  Performed: CRNA   Performed by: Chuck Aguilar CRNA  Authorized by: Deysi Blank MD      Indications and Patient Condition  Indications for airway management: anesthesia  Sedation level: deep  Preoxygenated: yes  Patient position: sniffing  Mask difficulty assessment: 1 - vent by mask    Final Airway Details  Final airway type: endotracheal airway      Successful airway: ETT  Cuffed: yes   Successful intubation technique: direct laryngoscopy  Facilitating devices/methods: intubating stylet and cricoid pressure  Endotracheal tube insertion site: oral  Blade: Nyla  Blade size: #3  ETT size (mm): 7.0    Cormack-Lehane Classification: grade IIB - view of arytenoids or posterior of glottis only  Placement verified by: capnometry   Measured from: teeth  ETT to teeth (cm): 22  Number of attempts at approach: 1    Additional Comments  Atraumatic intubation, dentition and lips as preop

## 2023-08-23 NOTE — OPERATIVE REPORT
[facility]    PATIENT'S NAME: Nelson LOVELL   ATTENDING PHYSICIAN: Jason Eisenberg M.D. OPERATING PHYSICIAN: Jason Eisenberg M.D. PATIENT ACCOUNT#:   [de-identified]    [facility]  [facility]  MEDICAL RECORD #:   [id number]       YOB: 1980  ADMISSION DATE:       08/23/2023      OPERATION DATE:  08/23/2023    OPERATIVE REPORT    PREOPERATIVE DIAGNOSIS:  History of bilateral mastectomy and tissue expander reconstruction. POSTOPERATIVE DIAGNOSIS:  History of bilateral mastectomy and tissue expander reconstruction. PROCEDURE:    1. Removal of bilateral breast tissue expanders. 2.   Right breast capsulectomy and capsulorrhaphy. 3.   Placement of silicone gel implants, bilateral breasts. 4.   Autologous fat grafting to bilateral reconstructed breasts     ASSISTANT:  Geri Osler. SARI Chirinos     ANESTHESIA:  General.    ESTIMATED BLOOD LOSS:  Minimal.      COMPLICATIONS:  None. INDICATIONS:  The patient is a 40-year-old female who previously underwent bilateral nipple-sparing mastectomy and prepectoral tissue expander reconstruction. She completed uncomplicated expansion and now presents for exchange of permanent implants and fat grafting. FINDINGS:  Bilateral breasts reconstructed with Joanna Hailstone SoftTouch breast implant, style SSF, 365 mL, reference SSF-365, on the right serial #[id number], on the left serial #[id number]. OPERATIVE TECHNIQUE:  Informed consent was obtained from the patient. The risks, benefits, and alternatives were reviewed with the patient preoperatively. She expressed understanding and wished to proceed. The patient was marked in the preoperative holding area in the upright position. The midline and inframammary folds were marked. Bilateral inframammary fold scars were encompassed in transverse ellipses. Areas to be fat grafted were marked. Areas of central abdominal and flank lipodystrophy were marked for liposuction.   The patient was then taken to the operating room, properly identified, and placed in a supine position. Sequential compression devices were placed on bilateral lower extremities. Intravenous antibiotic prophylaxis was administered. The patient then underwent successful induction of general anesthesia and endotracheal intubation. The arms were placed abducted on foam-padded armboards and loosely secured with Kerlix. The chest and abdomen were prepped and draped sterilely. The inframammary fold incisions as well as the proposed liposuction port sites in the lateral abdomen were infiltrated with 1% lidocaine with epinephrine. Stab incisions were then created in the lateral abdomen and 1 L of tumescent solution was infiltrated into the central abdomen and flanks. Attention was turned to the breast.  Both breast scars were excised and sent for permanent pathologic analysis as right and left breast scars. Procedure turned to the right side. A superior subcutaneous flap was elevated sharply with a scalpel. A transverse capsulotomy was then created. An intact changed tissue expander was encountered and retrieved. The pocket was inspected. There was no periprosthetic fluid noted. There were no visible or palpable nodules noted. Complete incorporation of the acellular dermal matrix was noted. Sizers were placed, and attention was turned to the left breast.  A superior subcutaneous flap was elevated sharply with a scalpel. A transverse capsulotomy was then created, and an intact tissue expander was retrieved. The pocket was inspected. There was no periprosthetic fluid noted. There were no visible or palpable nodules noted. Complete incorporation of the acellular dermal matrix was noted. Attention was turned to the abdomen. Using a 3 mm Tulip cannula, hand-assisted liposuction of the central abdomen and flanks was performed.   The lipoaspirate was decanted and prepared on Telfa pads; 25 mL of usable fat was rendered. With sizers in place and the patient in the upright position, fat was grafted to bilateral breasts; 15 mL was grafted to the right breast, and 25 mL was grafted to the left breast.  Next, the sizers were placed and the patient was placed in the upright position. A visible transverse ridge along the lower pole of the left breast was noted, and internal examination revealed a fold in the acellular dermal matrix. This area was excised with curved Dowd scissors and the excised tissue was sent as left breast capsule. The resulting capsular defect was repaired with interrupted 2-0 Vicryl sutures. Next, sizers of different volumes and projections were placed. Ultimately, it appeared a style  mL implant gave the best size, shape, and symmetry. Both pockets were rinsed with Betadine and then antibiotic irrigation until clear. Hemostasis was checked and noted to be adequate. The surgical sites were isolated with Ioban, and gloves were changed. The implants were brought on the field and immediately bathed in antibiotic irrigation. They were checked for integrity and noted to be intact. The implants were placed in the prepectoral space taking care to maintain their proper orientation. The capsules were closed with running 2-0 Vicryl sutures, the deep dermis with 3-0 Vicryl, and the skin with 4-0 Monocryl subcuticular suture. Dermabond and Steri-Strips were placed on the incisions. Fluff gauze and a surgical bra were placed on the breasts. TopiFoam and an abdominal binder were placed on the abdomen. The patient was awakened, extubated, and taken to the recovery area in stable condition. There were no operative complications. All needle, sponge, and instrument counts were correct at the end of the procedure. Dictated By Rolanda White M.D.  d: 08/23/2023 15:36:04  t: 08/23/2023 17:21:55  Job [id number]  [facility]/

## 2023-08-23 NOTE — H&P
No change in [facility]'s H&P from 8/19. We reviewed the plan for removal of bilateral breast tissue expanders, placement of smooth, round, silicone implants, and fat grafting to bilateral reconstructed breasts. The nature of the procedure was reviewed with the patient. We reviewed the risks of surgery including but not limited to bleeding, infection, scarring, delayed wound healing, asymmetry, implant malposition, implant infection or extrusion requiring removal, ALCL, capsular contracture, hypertrophic scarring or keloid, injury to intra-abdominal structures, contour abnormalities, cysts or calcifications requiring biopsy, and need for further surgery. We reviewed the expected postoperative course including possible need for drains, as well as need for activity limitation and compression. Multiple questions were answered the patient's satisfaction. No guarantees as to outcome were offered. The patient expresses understanding and wishes to proceed.

## 2023-08-31 ENCOUNTER — NURSE ONLY (OUTPATIENT)
Dept: SURGERY | Facility: CLINIC | Age: 43
End: 2023-08-31
Payer: COMMERCIAL

## 2023-09-19 ENCOUNTER — OFFICE VISIT (OUTPATIENT)
Dept: SURGERY | Facility: CLINIC | Age: 43
End: 2023-09-19
Payer: COMMERCIAL

## 2023-09-19 DIAGNOSIS — Z90.13 S/P BILATERAL MASTECTOMY: Primary | ICD-10-CM

## 2023-09-19 PROCEDURE — 99024 POSTOP FOLLOW-UP VISIT: CPT | Performed by: SURGERY

## 2023-09-19 NOTE — PROGRESS NOTES
Luca Sharma is a 38year old female who presents today for a follow-up after undergoing exchange to  permanent implants on 8/23. She denies fever and chills. She denies nausea, vomiting, diarrhea or constipation. Physical Examination:  Breasts:Bilateral breast incisions are clean dry and intact. There is no erythema or seroma noted. Good shape and symmetry is noted. Assessment and Plan:  Patient is doing well. We discussed scar care including massage and moisturizer and silicone products. The patient may resume regular activity as tolerated. She will follow-up in 6 months for scar check. The plan was reviewed with the patient and questions were answered.

## 2023-11-08 ENCOUNTER — OFFICE VISIT (OUTPATIENT)
Dept: SURGERY | Facility: CLINIC | Age: 43
End: 2023-11-08
Payer: COMMERCIAL

## 2023-11-08 VITALS — HEART RATE: 79 BPM | TEMPERATURE: 97 F

## 2023-11-08 DIAGNOSIS — C50.411 MALIGNANT NEOPLASM OF UPPER-OUTER QUADRANT OF RIGHT BREAST IN FEMALE, ESTROGEN RECEPTOR POSITIVE: Primary | ICD-10-CM

## 2023-11-08 DIAGNOSIS — N60.99 ATYPICAL DUCTAL HYPERPLASIA OF BREAST: ICD-10-CM

## 2023-11-08 DIAGNOSIS — N60.99 ATYPICAL LOBULAR HYPERPLASIA (ALH) OF BREAST: ICD-10-CM

## 2023-11-08 DIAGNOSIS — Z17.0 MALIGNANT NEOPLASM OF UPPER-OUTER QUADRANT OF RIGHT BREAST IN FEMALE, ESTROGEN RECEPTOR POSITIVE: Primary | ICD-10-CM

## 2023-11-08 DIAGNOSIS — Z90.13 S/P BILATERAL MASTECTOMY: ICD-10-CM

## 2023-11-08 PROCEDURE — 99213 OFFICE O/P EST LOW 20 MIN: CPT | Performed by: SURGERY

## 2023-11-10 ENCOUNTER — HOSPITAL ENCOUNTER (OUTPATIENT)
Dept: MAMMOGRAPHY | Facility: HOSPITAL | Age: 43
Discharge: HOME | End: 2023-11-10
Attending: SURGERY
Payer: COMMERCIAL

## 2023-11-10 DIAGNOSIS — Z90.13 S/P BILATERAL MASTECTOMY: ICD-10-CM

## 2023-11-10 DIAGNOSIS — C50.411 MALIGNANT NEOPLASM OF UPPER-OUTER QUADRANT OF RIGHT BREAST IN FEMALE, ESTROGEN RECEPTOR POSITIVE: ICD-10-CM

## 2023-11-10 DIAGNOSIS — N60.99 ATYPICAL DUCTAL HYPERPLASIA OF BREAST: ICD-10-CM

## 2023-11-10 DIAGNOSIS — N60.99 ATYPICAL LOBULAR HYPERPLASIA (ALH) OF BREAST: ICD-10-CM

## 2023-11-10 DIAGNOSIS — Z17.0 MALIGNANT NEOPLASM OF UPPER-OUTER QUADRANT OF RIGHT BREAST IN FEMALE, ESTROGEN RECEPTOR POSITIVE: ICD-10-CM

## 2023-11-10 PROCEDURE — 76641 ULTRASOUND BREAST COMPLETE: CPT | Performed by: SURGERY

## 2023-11-11 DIAGNOSIS — Z17.0 MALIGNANT NEOPLASM OF UPPER-OUTER QUADRANT OF RIGHT BREAST IN FEMALE, ESTROGEN RECEPTOR POSITIVE: Primary | ICD-10-CM

## 2023-11-11 DIAGNOSIS — C50.411 MALIGNANT NEOPLASM OF UPPER-OUTER QUADRANT OF RIGHT BREAST IN FEMALE, ESTROGEN RECEPTOR POSITIVE: Primary | ICD-10-CM

## 2023-11-13 ENCOUNTER — TELEPHONE (OUTPATIENT)
Facility: LOCATION | Age: 43
End: 2023-11-13

## 2023-11-13 NOTE — TELEPHONE ENCOUNTER
----- Message from Rustam Patterson MD sent at 11/11/2023 12:37 PM CST -----  Please put in the recall system for an ultrasound in 3 months. The order has already been placed.       Rustam Patterson MD

## 2024-02-06 ENCOUNTER — OFFICE VISIT (OUTPATIENT)
Dept: FAMILY MEDICINE CLINIC | Facility: CLINIC | Age: 44
End: 2024-02-06
Payer: COMMERCIAL

## 2024-02-06 ENCOUNTER — LAB ENCOUNTER (OUTPATIENT)
Dept: LAB | Age: 44
End: 2024-02-06
Attending: FAMILY MEDICINE
Payer: COMMERCIAL

## 2024-02-06 VITALS
SYSTOLIC BLOOD PRESSURE: 112 MMHG | OXYGEN SATURATION: 98 % | TEMPERATURE: 98 F | HEART RATE: 94 BPM | BODY MASS INDEX: 21.4 KG/M2 | DIASTOLIC BLOOD PRESSURE: 66 MMHG | RESPIRATION RATE: 16 BRPM | WEIGHT: 130 LBS | HEIGHT: 65.5 IN

## 2024-02-06 DIAGNOSIS — Z00.00 WELL ADULT EXAM: Primary | ICD-10-CM

## 2024-02-06 DIAGNOSIS — Z00.00 WELL ADULT EXAM: ICD-10-CM

## 2024-02-06 DIAGNOSIS — Z90.13 S/P BILATERAL MASTECTOMY: ICD-10-CM

## 2024-02-06 DIAGNOSIS — C50.411 MALIGNANT NEOPLASM OF UPPER-OUTER QUADRANT OF RIGHT BREAST IN FEMALE, ESTROGEN RECEPTOR POSITIVE  (HCC): ICD-10-CM

## 2024-02-06 DIAGNOSIS — Z17.0 MALIGNANT NEOPLASM OF UPPER-OUTER QUADRANT OF RIGHT BREAST IN FEMALE, ESTROGEN RECEPTOR POSITIVE  (HCC): ICD-10-CM

## 2024-02-06 DIAGNOSIS — N60.99 ATYPICAL LOBULAR HYPERPLASIA (ALH) OF BREAST: ICD-10-CM

## 2024-02-06 DIAGNOSIS — Z12.11 COLON CANCER SCREENING: ICD-10-CM

## 2024-02-06 LAB
ALBUMIN SERPL-MCNC: 3.9 G/DL (ref 3.4–5)
ALBUMIN/GLOB SERPL: 1.1 {RATIO} (ref 1–2)
ALP LIVER SERPL-CCNC: 53 U/L
ALT SERPL-CCNC: 18 U/L
ANION GAP SERPL CALC-SCNC: 5 MMOL/L (ref 0–18)
AST SERPL-CCNC: 12 U/L (ref 15–37)
BASOPHILS # BLD AUTO: 0.06 X10(3) UL (ref 0–0.2)
BASOPHILS NFR BLD AUTO: 0.7 %
BILIRUB SERPL-MCNC: 1 MG/DL (ref 0.1–2)
BUN BLD-MCNC: 13 MG/DL (ref 9–23)
CALCIUM BLD-MCNC: 9.5 MG/DL (ref 8.5–10.1)
CHLORIDE SERPL-SCNC: 107 MMOL/L (ref 98–112)
CHOLEST SERPL-MCNC: 178 MG/DL (ref ?–200)
CO2 SERPL-SCNC: 27 MMOL/L (ref 21–32)
CREAT BLD-MCNC: 0.75 MG/DL
EGFRCR SERPLBLD CKD-EPI 2021: 100 ML/MIN/1.73M2 (ref 60–?)
EOSINOPHIL # BLD AUTO: 0.15 X10(3) UL (ref 0–0.7)
EOSINOPHIL NFR BLD AUTO: 1.7 %
ERYTHROCYTE [DISTWIDTH] IN BLOOD BY AUTOMATED COUNT: 13.9 %
FASTING PATIENT LIPID ANSWER: YES
FASTING STATUS PATIENT QL REPORTED: YES
GLOBULIN PLAS-MCNC: 3.6 G/DL (ref 2.8–4.4)
GLUCOSE BLD-MCNC: 80 MG/DL (ref 70–99)
HCT VFR BLD AUTO: 38.9 %
HDLC SERPL-MCNC: 84 MG/DL (ref 40–59)
HGB BLD-MCNC: 13 G/DL
IMM GRANULOCYTES # BLD AUTO: 0.03 X10(3) UL (ref 0–1)
IMM GRANULOCYTES NFR BLD: 0.3 %
LDLC SERPL CALC-MCNC: 82 MG/DL (ref ?–100)
LYMPHOCYTES # BLD AUTO: 2.1 X10(3) UL (ref 1–4)
LYMPHOCYTES NFR BLD AUTO: 23.4 %
MCH RBC QN AUTO: 29.8 PG (ref 26–34)
MCHC RBC AUTO-ENTMCNC: 33.4 G/DL (ref 31–37)
MCV RBC AUTO: 89.2 FL
MONOCYTES # BLD AUTO: 0.67 X10(3) UL (ref 0.1–1)
MONOCYTES NFR BLD AUTO: 7.5 %
NEUTROPHILS # BLD AUTO: 5.95 X10 (3) UL (ref 1.5–7.7)
NEUTROPHILS # BLD AUTO: 5.95 X10(3) UL (ref 1.5–7.7)
NEUTROPHILS NFR BLD AUTO: 66.4 %
NONHDLC SERPL-MCNC: 94 MG/DL (ref ?–130)
OSMOLALITY SERPL CALC.SUM OF ELEC: 287 MOSM/KG (ref 275–295)
PLATELET # BLD AUTO: 303 10(3)UL (ref 150–450)
POTASSIUM SERPL-SCNC: 4.1 MMOL/L (ref 3.5–5.1)
PROT SERPL-MCNC: 7.5 G/DL (ref 6.4–8.2)
RBC # BLD AUTO: 4.36 X10(6)UL
SODIUM SERPL-SCNC: 139 MMOL/L (ref 136–145)
TRIGL SERPL-MCNC: 63 MG/DL (ref 30–149)
VLDLC SERPL CALC-MCNC: 10 MG/DL (ref 0–30)
WBC # BLD AUTO: 9 X10(3) UL (ref 4–11)

## 2024-02-06 PROCEDURE — 3078F DIAST BP <80 MM HG: CPT | Performed by: FAMILY MEDICINE

## 2024-02-06 PROCEDURE — 99396 PREV VISIT EST AGE 40-64: CPT | Performed by: FAMILY MEDICINE

## 2024-02-06 PROCEDURE — 3074F SYST BP LT 130 MM HG: CPT | Performed by: FAMILY MEDICINE

## 2024-02-06 PROCEDURE — 80053 COMPREHEN METABOLIC PANEL: CPT | Performed by: FAMILY MEDICINE

## 2024-02-06 PROCEDURE — 80061 LIPID PANEL: CPT | Performed by: FAMILY MEDICINE

## 2024-02-06 PROCEDURE — 3008F BODY MASS INDEX DOCD: CPT | Performed by: FAMILY MEDICINE

## 2024-02-06 PROCEDURE — 85025 COMPLETE CBC W/AUTO DIFF WBC: CPT | Performed by: FAMILY MEDICINE

## 2024-02-06 NOTE — PROGRESS NOTES
Note to patient: The 21st Century Cures Act makes medical notes like these available to patients in the interest of transparency. However, be advised this is a medical document. It is intended as peer to peer communication. It is written in medical language and may contain abbreviations or verbiage that are unfamiliar. It may appear blunt or direct. Medical documents are intended to carry relevant information, facts as evident, and the clinical opinion of the practitioner.         Chief Complaint   Patient presents with    Physical       HPI:  43-year-old female with past medical history of breast cancer s/p bilateral mastectomy with reconstruction in 2023. presents for annual physical examination.   In November she felt pain in her R breast lateral aspect. US reveaaled reactive LN. Due for repeat US.   Reports intermittent pain- Soreness/pulling pain. Reactive lymph node- likely due to trauma of surgery and should self resolve - will schedule another ultrasound after feb 11.     Regular menses  Smoking: none  Alcohol: none   Drugs: none   Sexual hx: 1 partner   STD hx: declined  Occupation: stay at home mom.   No family history of colon cancer or breast cancer.  Pap smear: January 2021 normal, will be seeing gyn.   Tdap: 2018  Patient is a healthy diet and exercises regularly.     Review of Systems   Constitutional: Negative for fever, chills and fatigue. No distress.  HENT: Negative for hearing loss, congestion, sore throat, neck pain   Eyes: Negative for pain and visual disturbance.   Respiratory: Negative for cough, chest tightness, shortness of breath and wheezing.    Cardiovascular: Negative for chest pain, palpitations and leg swelling.   Gastrointestinal: Negative for nausea, vomiting, abdominal pain, diarrhea, blood in stool and abdominal distention.   Genitourinary: Negative for dysuria, hematuria and difficulty urinating.   Musculoskeletal: Negative for myalgias, back pain, joint swelling, arthralgias and  gait problem.   Skin: Negative for color change and rash.   Neurological: Negative for dizziness, syncope, weakness, numbness, tingling and headaches.   Hematological: Negative for adenopathy. Does not bruise/bleed easily.   Psychiatric/Behavioral: The patient is not nervous/anxious. No depression.    Patient Active Problem List   Diagnosis    Atypical ductal hyperplasia of breast    Atypical lobular hyperplasia (ALH) of breast    S/P bilateral mastectomy    Malignant neoplasm of upper-outer quadrant of right breast in female, estrogen receptor positive  (HCC)       Past Medical History:   Diagnosis Date    Anesthesia complication     Atypical ductal hyperplasia of right breast 10/01/2022    Cancer (HCC)     Breast cancer    COVID-19 2023    asymptomatic    History of COVID-19 2022    sinus congestion and heacache. not hospitalized, no continued symptoms    PONV (postoperative nausea and vomiting)      Past Surgical History:   Procedure Laterality Date    SANTI BIOPSY STEREOTACTIC GUIDED        02/15/2017      2018    ORAL SURGERY  1997    wisdom teeth/general anesthesia    OTHER SURGICAL HISTORY  23    Bilateral Mastectomy  Breast Reconstruction     Family History   Problem Relation Age of Onset    Other (Other) Mother         Hypothyroid    Heart Disorder Maternal Grandfather     Heart Attack Maternal Grandfather     Prostate Cancer Maternal Grandfather     No Known Problems Paternal Grandmother     Ulcerative Colitis Paternal Grandfather      Social History     Socioeconomic History    Marital status:     Number of children: 2   Tobacco Use    Smoking status: Never    Smokeless tobacco: Never   Vaping Use    Vaping Use: Never used   Substance and Sexual Activity    Alcohol use: No    Drug use: Never    Sexual activity: Yes     Partners: Male     Birth control/protection: Condom   Other Topics Concern    Caffeine Concern No    Exercise Yes    Seat Belt Yes    Special Diet  No    Stress Concern No    Weight Concern No       No current outpatient medications on file.       Allergies  No Known Allergies    Health Maintenance  Immunizations:  Immunization History   Administered Date(s) Administered    Covid-19 Vaccine Pfizer 30 mcg/0.3 ml 04/08/2021, 04/29/2021, 01/19/2022    FLUZONE 6 months and older PFS 0.5 ml (49797) 11/14/2016    TDAP 12/14/2016, 04/03/2018         Physical Exam  /66   Pulse 94   Temp 98 °F (36.7 °C) (Temporal)   Resp 16   Ht 5' 5.5\" (1.664 m)   Wt 130 lb (59 kg)   LMP 01/15/2024 (Exact Date)   SpO2 98%   BMI 21.30 kg/m²   Constitutional: Oriented to person, place, and time. No distress.   HEENT:  Normocephalic and atraumatic. Hearing and tympanic membranes normal.  Nose normal. Oropharynx is clear and moist.   Eyes: Conjunctivae and EOM are normal. PERRLA. No scleral icterus.   Neck: Normal range of motion. Neck supple. No mass and no thyromegaly present.   Cardiovascular: Normal rate, regular rhythm and intact distal pulses.  No murmur, rubs or gallops.   Pulmonary/Chest: Effort normal and breath sounds normal. No respiratory distress. No wheezes, rhonchi or rales  Abdominal: Soft. Bowel sounds are normal. Non tender, no masses, no organomegaly or hernias.  Musculoskeletal: No edema and no tenderness.    Right lateral aspect of right breast with increased density/tenderness, no discrete mass palpable.   Psychiatric: Normal mood and affect.     A/P:    Encounter Diagnoses   Name Primary?    Well adult exam Yes    Colon cancer screening     Malignant neoplasm of upper-outer quadrant of right breast in female, estrogen receptor positive  (HCC)     Atypical lobular hyperplasia (ALH) of breast     S/P bilateral mastectomy      - -Discussed   diet and exercise, counseled on safe sex, std risks, vaccine and screening guidelines.   - pt to schedule right breast US.   Will check blood work as ordered.   Colonoscopy referral given.   Orders Placed This  Encounter   Procedures    Lipid Panel [E]    CBC W Differential W Platelet [E]    Comp Metabolic Panel (14) [E]       Meds & Refills for this Visit:  Requested Prescriptions      No prescriptions requested or ordered in this encounter       Imaging & Consults:  GASTRO - INTERNAL      No follow-ups on file.    There are no Patient Instructions on file for this visit.    All questions were answered and the patient understands the plan.     Indy Malin,         This note was prepared using Dragon Medical voice recognition dictation software. As a result errors may occur. When identified these errors have been corrected. While every attempt is made to correct errors during dictation discrepancies may still exist.

## 2024-03-01 ENCOUNTER — HOSPITAL ENCOUNTER (OUTPATIENT)
Dept: ULTRASOUND IMAGING | Age: 44
Discharge: HOME | End: 2024-03-01
Attending: SURGERY
Payer: COMMERCIAL

## 2024-03-01 ENCOUNTER — TELEPHONE (OUTPATIENT)
Dept: FAMILY MEDICINE CLINIC | Facility: CLINIC | Age: 44
End: 2024-03-01

## 2024-03-01 DIAGNOSIS — Z17.0 MALIGNANT NEOPLASM OF UPPER-OUTER QUADRANT OF RIGHT BREAST IN FEMALE, ESTROGEN RECEPTOR POSITIVE (HCC): ICD-10-CM

## 2024-03-01 DIAGNOSIS — C50.411 MALIGNANT NEOPLASM OF UPPER-OUTER QUADRANT OF RIGHT BREAST IN FEMALE, ESTROGEN RECEPTOR POSITIVE (HCC): ICD-10-CM

## 2024-03-01 DIAGNOSIS — N63.0 BREAST NODULE: Primary | ICD-10-CM

## 2024-03-01 PROCEDURE — 76642 ULTRASOUND BREAST LIMITED: CPT | Performed by: SURGERY

## 2024-03-01 NOTE — IMAGING NOTE
Daniela Buenrostro is recommended for an ultrasound guided biopsy of the right breast lymph node by Dr.Stromberg.     History Malignant neoplasm of upper-outer quadrant of right breast   Findings- There is a persistent 3 x 5 x 5 mm lymph node at the 9:30 o'clock position 9 cm from the right nipple.  This lymph node has mild thickening of its cortex.  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    Given the history of malignancy, the persistence of this lymph node, and the mild cortical thickening of this lymph node, ultrasound-guided biopsy of this lymph node is recommended for further characterization.   Recommendation- ULTRASOUND-GUIDED BIOPSY: RIGHT BREAST     See EMR for complete imaging report    Medications and allergies reviewed: NKDA reported  No current outpatient medications on file.       Daniela Chitra denies the use of prescribed anticoagulants, denies known bleeding disorders and/or liver disease, denies chemotherapy    Procedure explained and questions answered.   Daniela Buenrostro provided with written educational material.     Ms. Buenrostro instructed to take 1000 mg of acetaminophen on the day of the biopsy, eat a light meal, and bring or wear a sport bra.  Post biopsy care and instruction reviewed: including no lifting more than five pounds, no upper body exercise, icing of biopsy site, no submerging in water.  Daniela Buenrostro verbalized understanding.    Ms. Buenrostro informed that the [facility] schedulers would be contacting her once the biopsy order is received to schedule an appointment.

## 2024-03-01 NOTE — TELEPHONE ENCOUNTER
[facility] requesting order for biopsy for patient. Requesting call before 3 if possible to discuss.  
Spoke with Odette, pt had breast US today. This was ordered by Dr. Reyes due to patient being referred to her in 2022 for breast cancer. Dr. Reyes no longer with [facility]. Breast biopsy recommended due to lymph node.     Requesting order for Ultrasound guided biopsy R breast 1 site.    Order placed  
No

## 2024-03-06 ENCOUNTER — HOSPITAL ENCOUNTER (OUTPATIENT)
Dept: MAMMOGRAPHY | Facility: HOSPITAL | Age: 44
Discharge: HOME | End: 2024-03-06
Attending: FAMILY MEDICINE
Payer: COMMERCIAL

## 2024-03-06 DIAGNOSIS — N63.0 BREAST NODULE: ICD-10-CM

## 2024-03-06 PROCEDURE — 88305 TISSUE EXAM BY PATHOLOGIST: CPT | Performed by: FAMILY MEDICINE

## 2024-03-06 PROCEDURE — 19083 BX BREAST 1ST LESION US IMAG: CPT | Performed by: FAMILY MEDICINE

## 2024-03-08 NOTE — IMAGING NOTE
0900: Spoke with Daniela Buenrostro post ultrasound guided biopsy right breast node.  Introduced myself as breast nurse coordinator and informed Ms. Buenrostro of the purpose of my call.  Name and date of birth verified by patient.    Reinforced post biopsy care and instruction.  Alessandra Buenrostro denies any issues with biopsy site- bleeding, drainage, redness, tenderness.     Pathology results and recommendations discussed as follows: benign finding  Final Diagnosis:   Ultrasound-guided core biopsy, right breast mass, 9:30 position:  -Few cores of fibroadipose tissue and fibrous tissue.   See EMR for complete pathology report    Per Dr. Tavares-Pathology shows benign findings.  However, lymphoid tissue was not identified within the specimen.  Of note, this was an extremely technically challenging biopsy due to the close proximity of the intramammary lymph node to the implant.  The probability of the intramammary lymph node being malignant is felt to be extremely low.    Recommend follow-up right breast ultrasound in 6 months.    Daniela Buenrostro verbalized understanding and agreement to the above.    Ms. Preciousclarita instructed to perform breast self-exams and notify physician of any changes/concerns. Alessandra Buenrostro verbalized agreement.

## 2024-03-10 DIAGNOSIS — N60.99 ATYPICAL LOBULAR HYPERPLASIA (ALH) OF BREAST: Primary | ICD-10-CM

## 2024-05-06 ENCOUNTER — OFFICE VISIT (OUTPATIENT)
Dept: SURGERY | Facility: CLINIC | Age: 44
End: 2024-05-06
Payer: COMMERCIAL

## 2024-05-06 VITALS — HEART RATE: 71 BPM | TEMPERATURE: 97 F

## 2024-05-06 DIAGNOSIS — C50.411 MALIGNANT NEOPLASM OF UPPER-OUTER QUADRANT OF RIGHT BREAST IN FEMALE, ESTROGEN RECEPTOR POSITIVE (HCC): Primary | ICD-10-CM

## 2024-05-06 DIAGNOSIS — Z17.0 MALIGNANT NEOPLASM OF UPPER-OUTER QUADRANT OF RIGHT BREAST IN FEMALE, ESTROGEN RECEPTOR POSITIVE (HCC): Primary | ICD-10-CM

## 2024-05-06 DIAGNOSIS — N60.99 ATYPICAL LOBULAR HYPERPLASIA (ALH) OF BREAST: ICD-10-CM

## 2024-05-06 DIAGNOSIS — N60.99 ATYPICAL DUCTAL HYPERPLASIA OF BREAST: ICD-10-CM

## 2024-05-06 DIAGNOSIS — Z90.13 S/P BILATERAL MASTECTOMY: ICD-10-CM

## 2024-06-06 ENCOUNTER — OFFICE VISIT (OUTPATIENT)
Dept: HEMATOLOGY/ONCOLOGY | Age: 44
End: 2024-06-06
Attending: INTERNAL MEDICINE
Payer: COMMERCIAL

## 2024-06-06 VITALS
DIASTOLIC BLOOD PRESSURE: 76 MMHG | TEMPERATURE: 98 F | SYSTOLIC BLOOD PRESSURE: 145 MMHG | OXYGEN SATURATION: 99 % | HEART RATE: 71 BPM | BODY MASS INDEX: 22 KG/M2 | WEIGHT: 132.5 LBS | RESPIRATION RATE: 18 BRPM

## 2024-06-06 DIAGNOSIS — C50.411 MALIGNANT NEOPLASM OF UPPER-OUTER QUADRANT OF RIGHT BREAST IN FEMALE, ESTROGEN RECEPTOR POSITIVE (HCC): Primary | ICD-10-CM

## 2024-06-06 DIAGNOSIS — Z17.0 MALIGNANT NEOPLASM OF UPPER-OUTER QUADRANT OF RIGHT BREAST IN FEMALE, ESTROGEN RECEPTOR POSITIVE (HCC): Primary | ICD-10-CM

## 2024-06-06 PROCEDURE — 99214 OFFICE O/P EST MOD 30 MIN: CPT | Performed by: INTERNAL MEDICINE

## 2024-06-06 NOTE — PROGRESS NOTES
Patient here for 1 year f/u and breast exam. Patient not taking tamoxifen. Patient states she right side axillary tenderness that occurred after reconstruction surgery. Patient has no further concerns or complaints at this time.     Education Record    Learner:  Patient    Disease / Diagnosis: malignant neoplasm of breast     Barriers / Limitations:  None   Comments:    Method:  Discussion   Comments:    General Topics:  Plan of care reviewed   Comments:    Outcome:  Shows understanding   Comments:

## 2024-06-06 NOTE — PROGRESS NOTES
Cancer Center Progress Note    Problem List:      Patient Active Problem List   Diagnosis    Atypical ductal hyperplasia of breast    Atypical lobular hyperplasia (ALH) of breast    S/P bilateral mastectomy    Malignant neoplasm of upper-outer quadrant of right breast in female, estrogen receptor positive (HCC)       Interim History:    Daniela Buenrostro presents today for evaluation and management of a diagnosis of right breast cancer.    The patient returns after having bilateral mastectomy on 4/19/2023. I had seen her for ADH. 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 The ER was >95%, MS was >90%, Ki-67 was <5% and Her2 was 1+. The left breast was negative.    I have not seen her for 12 months. She has not started tamoxifen. She has no new pain. She has good energy level and appetite. She has some chronic tenderness in the right axilla. She had an US of the right axilla in 11/2023 and again in 3/2024 that showed a small lymph node. She had an US biopsy on 3/6/2024 that was benign but had no lymph tissue in the specimen. She is scheduled for a six month follow up ultrasound.    She has no other complaints.      Pathology on 4/19/2023:  Final Diagnosis:   A.  Left breast, mastectomy:  -Fibrocystic change with stromal fibrosis, apocrine cysts, sclerosing adenosis and microcalcifications.     B.  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      -The margins are negative for invasive carcinoma (closest margin posterior at 2.5 mm).  -Flat epithelial atypia and atypical ductal hyperplasia with calcifications.  -Lobular carcinoma in situ and atypical lobular hyperplasia.  -Prior biopsy sites.     C.  Right sentinel lymph nodes, excision:  -Two lymph nodes, negative for tumor (0/2).     D.  Additional right breast tissue, excision:  -Fibrofatty breast tissue with no evidence of tumor.     E.  Left  breast skin, excision:  -Skin with no evidence of tumor.     Addendum 1   Quantitative Immunohistochemistry:   Material:  Block B4  Population:   Tumor Cells     Antibody & Clone             Result Interpretation   Estrogen Receptor   -   SP1 >95 % Positive Cells Strong Positive   Progesterone Receptor   -   16 >90 % Positive Cells Strong Positive   KI-67   -   MM1 <5 % Positive Cells Low   Her2   -   CB11 1+ Negative             Review of Systems:   Constitutional: Negative for anorexia, fatigue, fevers, chills, night sweats and weight loss.  Psychiatric: The patient's mood was calm and appropriate for this visit.  The pertinent positives and negatives were described. All other systems were negative.    PMH/PSH:  Past Medical History:    Anesthesia complication    Atypical ductal hyperplasia of right breast    Body piercing    ears pierced    Cancer (HCC)    Breast cancer    COVID-19    asymptomatic    Enlarged lymph node    right breast    Hemorrhoids    only when pregnant    History of COVID-19    sinus congestion and heacache. not hospitalized, no continued symptoms    PONV (postoperative nausea and vomiting)       Past Surgical History:   Procedure Laterality Date    Renaldo biopsy stereotactic guided        02/15/2017      2018    Oral surgery  1997    wisdom teeth/general anesthesia    Other surgical history  23    Bilateral Mastectomy  Breast Reconstruction       Family History Reviewed:  Family History   Problem Relation Age of Onset    Colon Polyps Father     Other (Other) Mother         Hypothyroid    Heart Disorder Maternal Grandfather     Heart Attack Maternal Grandfather     Prostate Cancer Maternal Grandfather     No Known Problems Paternal Grandmother     Ulcerative Colitis Paternal Grandfather        Allergies:     No Known Allergies    Medications:  No outpatient medications have been marked as taking for the 24 encounter (Office Visit) with Wilner Sanderson MD.          Vital Signs:    Vitals:    06/06/24 1042   BP: 145/76   Pulse: 71   Resp: 18   Temp: 98.4 °F (36.9 °C)        Performance Status:  ECOG 0: Fully active, able to carry on all pre-disease performance without restriction     Physical Examination:    Constitutional: Patient is alert and oriented x 3, not in acute distress.  Eyes: Anicteric sclera, pink conjunctiva.  HEENT:  Oropharynx is clear. Neck is supple.  Respiratory: Clear to auscultation and percussion. No rales.  No wheezes.  Cardiovascular: Regular rate and rhythm. No murmurs.  Breast: The bilateral chest and implants are intact. There is no mass or skin change.  Gastrointestinal: Soft, non tender with good bowel sounds.  Musculoskeletal: No edema. No calf tenderness.  Skin: No suspicious skin lesion, no rash, no ulceration.  Lymphatics: There is no palpable lymphadenopathy throughout in the cervical, supraclavicular, or axillary regions.  Psychiatric: The patient's mood is calm and appropriate for this visit.       Labs reviewed at this visit:     Lab Results   Component Value Date    WBC 9.0 02/06/2024    RBC 4.36 02/06/2024    HGB 13.0 02/06/2024    HCT 38.9 02/06/2024    MCV 89.2 02/06/2024    MCH 29.8 02/06/2024    MCHC 33.4 02/06/2024    RDW 13.9 02/06/2024    .0 02/06/2024     Lab Results   Component Value Date     02/06/2024    K 4.1 02/06/2024     02/06/2024    CO2 27.0 02/06/2024    BUN 13 02/06/2024    CREATSERUM 0.75 02/06/2024    GLU 80 02/06/2024    CA 9.5 02/06/2024    ALKPHO 53 02/06/2024    ALT 18 02/06/2024    AST 12 (L) 02/06/2024    BILT 1.0 02/06/2024    ALB 3.9 02/06/2024    TP 7.5 02/06/2024       Radiologic imaging reviewed at this visit:    MRI Breast on 10/30/2022:  FINDINGS:  Breast composition:  Extremely dense, which lowers the sensitivity of mammography.   Enhancement: Moderate to marked background parenchymal enhancement. This limits MRI sensitivity.       Right breast:  There is focal more stippled non  mass like enhancement in the upper central right breast in the region of additional (non biopsied) calcifications at the 12 o'clock position right breast.  Recommend stereotactic sampling of these calcifications for tissue diagnosis.  No dominant enhancing mass with suspicious kinetics.  Post biopsy changes noted at the 2 o'clock and 4 o'clock position right breast at the site of biopsy proven ADH, (Q clip )and biopsy proven atypical lobular hyperplasia, (top hat clip).  No enlarged right axillary adenopathy. No internal mammary adenopathy.       Left breast:  Numerous scattered enhancing foci throughout the left breast all similar in size and kinetics.  No dominant enhancing focus with suspicious kinetics.  No enlarged left axillary adenopathy.  No internal mammary adenopathy.       Impression   CONCLUSION:  There is marked background parenchymal enhancement which limits MRI sensitivity.       There is stippled non mass like enhancement in the upper central right breast in the region of additional calcifications at the 12 o'clock position which have not undergone biopsy.  Recommend additional stereotactic sampling of these calcifications at 1200 or alternatively these calcifications are amenable to preoperative mammographic wire localization.         There are post biopsy changes at the 0200 and 0400 positions right breast.  No significant surrounding enhancement at the site of biopsy proven ADH ( 0400 Q clip right breast) or ALH ( 2:00 top hat clip) right breast.       No suspicious enhancement in the left breast.      No enlarged axillary or internal mammary adenopathy.       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 She has very  favorable pathology with a very small invasive tubular cancer. I again discussed the risk of distant recurrence. I again discussed the option of adjuvant tamoxifen. I recommended that she take tamoxifen 20 mg daily as adjuvant therapy for five years. She was concerned about side effects. I had a long discussion about this. She will consider starting this up for three months to see if she has side effects. I will see her in six months. She will get the US of the right axilla as ordered.        Wilner Sanderson MD

## 2024-06-07 NOTE — PROGRESS NOTES
Follow Up Visit Note       Active Problems      1. Malignant neoplasm of upper-outer quadrant of right breast in female, estrogen receptor positive (HCC)    2. Atypical ductal hyperplasia of breast    3. Atypical lobular hyperplasia (ALH) of breast    4. S/P bilateral mastectomy          Chief Complaint   Chief Complaint   Patient presents with    New Patient     NP- Bilateral Mastectomy on 23 by Dr. Reyes, 6 months breast f/u, US Breast on 3/1/24 and Biopsy on 3/6/24- tenderness under right arm        Allergies  Daniela has No Known Allergies.    Past Medical / Surgical / Social / Family History    The past medical and past surgical history have been reviewed by me today.    Past Medical History:    Anesthesia complication    Atypical ductal hyperplasia of right breast    Body piercing    ears pierced    Cancer (HCC)    Breast cancer    COVID-19    asymptomatic    Enlarged lymph node    right breast    Hemorrhoids    only when pregnant    History of COVID-19    sinus congestion and heacache. not hospitalized, no continued symptoms    PONV (postoperative nausea and vomiting)     Past Surgical History:   Procedure Laterality Date    Renaldo biopsy stereotactic guided        02/15/2017      2018    Oral surgery  1997    wisdom teeth/general anesthesia    Other surgical history  23    Bilateral Mastectomy  Breast Reconstruction       The family history and social history have been reviewed by me today.    Social History     Tobacco Use    Smoking status: Never    Smokeless tobacco: Never   Substance Use Topics    Alcohol use: No      No current outpatient medications on file.     Review of Systems  The Review of Systems has been reviewed by me during today.  Review of Systems   Constitutional:  Negative for diaphoresis, fever and unexpected weight change.   HENT:  Negative for congestion, nosebleeds, sore throat and trouble swallowing.    Eyes:  Negative for pain, discharge, redness and  visual disturbance.   Respiratory:  Negative for cough, shortness of breath and wheezing.    Cardiovascular:  Negative for chest pain and palpitations.   Gastrointestinal:  Negative for abdominal distention, abdominal pain, blood in stool, constipation, diarrhea, nausea and vomiting.   Genitourinary:  Negative for difficulty urinating, dysuria and frequency.   Musculoskeletal:  Negative for joint swelling and neck pain.   Skin:  Negative for color change and rash.   Neurological:  Negative for dizziness, syncope and light-headedness.   Hematological:  Negative for adenopathy. Does not bruise/bleed easily.   Psychiatric/Behavioral:  Negative for confusion, hallucinations and sleep disturbance.         Physical Findings   Pulse 71   Temp 97 °F (36.1 °C) (Temporal)   LMP 01/15/2024 (Exact Date)   Physical Exam  Constitutional:       Appearance: She is well-developed.   HENT:      Head: Normocephalic and atraumatic.   Eyes:      Pupils: Pupils are equal, round, and reactive to light.   Cardiovascular:      Rate and Rhythm: Normal rate and regular rhythm.   Pulmonary:      Effort: Pulmonary effort is normal.      Breath sounds: Normal breath sounds.   Abdominal:      General: Bowel sounds are normal. There is no distension.      Palpations: Abdomen is soft.      Tenderness: There is no abdominal tenderness.   Musculoskeletal:         General: No deformity. Normal range of motion.   Skin:     General: Skin is warm and dry.      Findings: No rash.   Neurological:      Mental Status: She is alert and oriented to person, place, and time.       Breast: The patient is examined in the sitting and supine position.    .  Status post bilateral mastectomy with reconstruction  Right Breast -no dominant masses are noted.  There is an area of tenderness in the upper outer quadrant there is no abnormal dimpling or change in contour.  There is no skin erythema, edema or peau d'orange.  There is no nipple inversion and no discharge  noted spontaneous or elicited.  No focal tenderness elic again no dominant masses ited.  Left Breast-no dominant masses are noted.  Small nodule at the edge of the infra mammary scar.   There is no abnormal dimpling or change in contour.  There is no skin erythema, edema or peau d'orange.  There is no nipple inversion and no discharge noted spontaneous or elicited.  No focal tenderness elicited.  Bilaterally there is no evidence of supraclavicular, infraclavicular or axillary lymphadenopathy           History of Present Illness  43-year-old female who is status post bilateral nipple sparing mastectomy with immediate reconstruction in May 2023.    The patient has a history of right breast cancer-4 mm invasive tubular carcinoma, ER and GA positive, HER2 +1, JUANA low.  She also has a history of right breast ADH and ALH.  Genetics was negative.    Ultrasound of the right breast was ordered by Dr. Reyes in March 2024.  This revealed a hypoechoic nodule at the 9:30 position measuring 5 mm.  Ultrasound-guided core biopsy of this lesion revealed fibroadipose tissue and fibrous tissue.  The biopsy was felt to be challenging due to the proximity of the nodule to the underlying breast implant.  Follow-up right breast ultrasound in 6 months is recommended for continued surveillance    Assessment   1. Malignant neoplasm of upper-outer quadrant of right breast in female, estrogen receptor positive (HCC)    2. Atypical ductal hyperplasia of breast    3. Atypical lobular hyperplasia (ALH) of breast    4. S/P bilateral mastectomy          Please note that this report has been produced using speech recognition software and may contain errors related to that system including but not limited to errors in grammar, punctuation and spelling as well as words and phrases that possibly may have been recognized inappropriately.  If there are any questions or concerns please contact the dictating provider for clarification.    Plan     The  patient will follow up with right breast ultrasound 6 months followed by a repeat examination by myself.  Ultrasound to identify area of previous biopsy  She is encouraged to continue monthly self examination and in annual examination by a physician.  Technique for self examination was reviewed.  The patient will follow up with you for her routine care         No orders of the defined types were placed in this encounter.      Imaging & Referrals   None    Follow Up  No follow-ups on file.    Alma Hernandez MD      Please note that this report has been produced using speech recognition software and may contain errors related to that system including but not limited to errors in grammar, punctuation and spelling as well as words and phrases that possibly may have been recognized inappropriately.  If there are any questions or concerns please contact the dictating provider for clarification.  The 21st Century Cures Act makes medical notes like these available to patients in the interest of trans parency. Please be advised this is a medical document. Medical documents are intended to carry relevant information, facts as evident, and the clinical opinion of the practitioner. The medical note is intended as peer to peer communication and may appear blunt or direct. It is written in medical language and may contain abbreviations or verbiage that are unfamiliar.  If there are any questions or concerns please contact the dictating provider for clarification.

## 2024-06-20 PROBLEM — D12.0 BENIGN NEOPLASM OF CECUM: Status: ACTIVE | Noted: 2024-06-20

## 2024-06-20 PROBLEM — D12.5 BENIGN NEOPLASM OF SIGMOID COLON: Status: ACTIVE | Noted: 2024-06-20

## 2024-06-20 PROBLEM — Z12.11 SPECIAL SCREENING FOR MALIGNANT NEOPLASMS, COLON: Status: ACTIVE | Noted: 2024-06-20

## 2024-07-02 ENCOUNTER — OFFICE VISIT (OUTPATIENT)
Dept: SURGERY | Facility: CLINIC | Age: 44
End: 2024-07-02
Payer: COMMERCIAL

## 2024-07-02 DIAGNOSIS — Z90.13 S/P BILATERAL MASTECTOMY: Primary | ICD-10-CM

## 2024-07-02 PROCEDURE — 99212 OFFICE O/P EST SF 10 MIN: CPT | Performed by: SURGERY

## 2024-07-02 NOTE — PROGRESS NOTES
Daniela Buenrostro is a 43 year old female who presents today for a yearly follow-up.  She has a history of bilateral nipple sparing mastectomy and reconstruction with style  cc implant in August 2023.  She reports having undergone a biopsy of a right breast lesion in March.  Fibroadipose and fibrous tissue were found on pathology.  Follow-up imaging was recommended to be performed in September.  The patient reports mild intermittent discomfort at the lateral aspect of her right breast.  She denies any new breast masses, skin changes, or nipple discharge.    Physical Examination:  HEENT: There is no cervical or supraclavicular lymphadenopathy noted.    Breasts: Bilateral breasts are soft without palpable masses.  There is no nipple inversion or nipple discharge noted.  Well-healed inframammary fold scars are noted.  There is no axillary lymphadenopathy noted bilaterally.  There is no erythema or seroma noted.    Assessment and Plan:  Patient is doing well.  We reviewed the recommended FDA surveillance protocol for silicone implants.  We discussed continuing regular self breast examination with a plan for follow-up in 1 year or sooner if any changes are detected clinically or on imaging.  The plan was reviewed with the patient and questions were answered.

## 2024-09-10 ENCOUNTER — HOSPITAL ENCOUNTER (OUTPATIENT)
Dept: ULTRASOUND IMAGING | Age: 44
Discharge: HOME | End: 2024-09-10
Attending: FAMILY MEDICINE
Payer: COMMERCIAL

## 2024-09-10 DIAGNOSIS — N60.99 ATYPICAL LOBULAR HYPERPLASIA (ALH) OF BREAST: ICD-10-CM

## 2024-09-10 PROCEDURE — 76642 ULTRASOUND BREAST LIMITED: CPT | Performed by: FAMILY MEDICINE

## 2024-09-11 DIAGNOSIS — Z90.13 S/P BILATERAL MASTECTOMY: ICD-10-CM

## 2024-09-11 DIAGNOSIS — N60.99 ATYPICAL LOBULAR HYPERPLASIA (ALH) OF BREAST: Primary | ICD-10-CM

## 2024-09-11 PROBLEM — N63.10 MASS OF RIGHT BREAST: Status: ACTIVE | Noted: 2024-09-11

## 2024-09-11 NOTE — PROGRESS NOTES
Dr. Sanderson, I am forwarding you her breast ultrasound results.  I can also go ahead and place the order for the right breast ultrasound to be repeated in 6 months.  Thank you

## 2024-09-26 RX ORDER — TAMOXIFEN CITRATE 20 MG/1
20 TABLET ORAL DAILY
Qty: 90 TABLET | Refills: 3 | Status: SHIPPED | OUTPATIENT
Start: 2024-09-26

## 2024-11-08 ENCOUNTER — OFFICE VISIT (OUTPATIENT)
Dept: FAMILY MEDICINE CLINIC | Facility: CLINIC | Age: 44
End: 2024-11-08
Payer: COMMERCIAL

## 2024-11-08 ENCOUNTER — LAB ENCOUNTER (OUTPATIENT)
Dept: LAB | Age: 44
End: 2024-11-08
Attending: FAMILY MEDICINE
Payer: COMMERCIAL

## 2024-11-08 VITALS
OXYGEN SATURATION: 99 % | HEART RATE: 92 BPM | TEMPERATURE: 98 F | SYSTOLIC BLOOD PRESSURE: 122 MMHG | WEIGHT: 131 LBS | BODY MASS INDEX: 21.56 KG/M2 | HEIGHT: 65.5 IN | RESPIRATION RATE: 16 BRPM | DIASTOLIC BLOOD PRESSURE: 72 MMHG

## 2024-11-08 DIAGNOSIS — Z28.21 REFUSED INFLUENZA VACCINE: ICD-10-CM

## 2024-11-08 DIAGNOSIS — R23.2 HOT FLASHES DUE TO TAMOXIFEN: ICD-10-CM

## 2024-11-08 DIAGNOSIS — R25.2 LEG CRAMPS: ICD-10-CM

## 2024-11-08 DIAGNOSIS — T45.1X5A HOT FLASHES DUE TO TAMOXIFEN: ICD-10-CM

## 2024-11-08 DIAGNOSIS — Z00.00 WELL ADULT EXAM: ICD-10-CM

## 2024-11-08 DIAGNOSIS — G47.09 OTHER INSOMNIA: ICD-10-CM

## 2024-11-08 DIAGNOSIS — R25.2 LEG CRAMPS: Primary | ICD-10-CM

## 2024-11-08 LAB
FOLATE SERPL-MCNC: 19.8 NG/ML (ref 5.4–?)
TSI SER-ACNC: 1.39 UIU/ML (ref 0.55–4.78)
VIT B12 SERPL-MCNC: 312 PG/ML (ref 211–911)
VIT D+METAB SERPL-MCNC: 32.2 NG/ML (ref 30–100)

## 2024-11-08 PROCEDURE — 82607 VITAMIN B-12: CPT | Performed by: FAMILY MEDICINE

## 2024-11-08 PROCEDURE — 3008F BODY MASS INDEX DOCD: CPT | Performed by: FAMILY MEDICINE

## 2024-11-08 PROCEDURE — 3074F SYST BP LT 130 MM HG: CPT | Performed by: FAMILY MEDICINE

## 2024-11-08 PROCEDURE — 99213 OFFICE O/P EST LOW 20 MIN: CPT | Performed by: FAMILY MEDICINE

## 2024-11-08 PROCEDURE — 80053 COMPREHEN METABOLIC PANEL: CPT | Performed by: FAMILY MEDICINE

## 2024-11-08 PROCEDURE — 3078F DIAST BP <80 MM HG: CPT | Performed by: FAMILY MEDICINE

## 2024-11-08 PROCEDURE — 82306 VITAMIN D 25 HYDROXY: CPT | Performed by: FAMILY MEDICINE

## 2024-11-08 PROCEDURE — 82746 ASSAY OF FOLIC ACID SERUM: CPT | Performed by: FAMILY MEDICINE

## 2024-11-08 PROCEDURE — 84443 ASSAY THYROID STIM HORMONE: CPT | Performed by: FAMILY MEDICINE

## 2024-11-08 RX ORDER — CLONAZEPAM 0.5 MG/1
0.5 TABLET ORAL NIGHTLY PRN
Qty: 30 TABLET | Refills: 2 | Status: SHIPPED | OUTPATIENT
Start: 2024-11-08

## 2024-11-08 NOTE — PROGRESS NOTES
Subjective:   Daniela Buenrostro is a 43 year old female who presents for Sleep Problem (Patient c/o having problems staying asleep, cramps in feet and legs  x 2-3 months, fatigue) and Swelling (Patient c/o lower leg swelling x 3 months )     History/Other:    Chief Complaint Reviewed and Verified  Nursing Notes Reviewed and   Verified  Tobacco Reviewed  Allergies Reviewed  Medications Reviewed    Problem List Reviewed  Medical History Reviewed  Surgical History   Reviewed  OB Status Reviewed  Family History Reviewed           HPI:  43-year-old female with past medical history of breast cancer s/p bilateral mastectomy with reconstruction in 2023 presents for several symptoms. Pt complains of swelling in her legs, hot flashes, leg cramps, trouble sleeping. She has been on tamoxifen since 5 months. Denies any other changes in health/medications/supplements.     Wt Readings from Last 6 Encounters:   11/08/24 131 lb (59.4 kg)   06/06/24 132 lb 8 oz (60.1 kg)   05/28/24 128 lb (58.1 kg)   04/03/24 128 lb (58.1 kg)   02/06/24 130 lb (59 kg)   08/23/23 129 lb 3.2 oz (58.6 kg)         Tobacco:  She has never smoked tobacco.    Current Outpatient Medications   Medication Sig Dispense Refill    clonazePAM 0.5 MG Oral Tab Take 1 tablet (0.5 mg total) by mouth nightly as needed (insomnia). 30 tablet 2    tamoxifen 20 MG Oral Tab Take 1 tablet (20 mg total) by mouth daily. 90 tablet 3         Review of Systems:  Review of Systems   Constitutional:  Negative for fatigue, fever and unexpected weight change.   HENT: Negative.     Respiratory: Negative.     Cardiovascular:  Positive for leg swelling.   Musculoskeletal:  Negative for arthralgias.       Objective:   /72   Pulse 92   Temp 97.8 °F (36.6 °C) (Temporal)   Resp 16   Ht 5' 5.5\" (1.664 m)   Wt 131 lb (59.4 kg)   LMP 10/02/2024 (Exact Date)   SpO2 99%   BMI 21.47 kg/m²  Estimated body mass index is 21.47 kg/m² as calculated from the following:     Height as of this encounter: 5' 5.5\" (1.664 m).    Weight as of this encounter: 131 lb (59.4 kg).  Physical Exam  Constitutional:       Appearance: Normal appearance.   HENT:      Head: Normocephalic.   Cardiovascular:      Rate and Rhythm: Normal rate and regular rhythm.      Heart sounds: Normal heart sounds. No murmur heard.  Pulmonary:      Effort: Pulmonary effort is normal.      Breath sounds: Normal breath sounds.   Musculoskeletal:      Right lower leg: Edema present.      Left lower leg: Edema present.      Comments: Nonpitting, trace edema in both legs up to knees. Normal pedal pulses bilaterally. Nontender extremities, no erythema.    Neurological:      Mental Status: She is alert.          Assessment & Plan:   1. Leg cramps (Primary)  -     TSH W Reflex To Free T4; Future; Expected date: 11/08/2024  -     Vitamin D; Future; Expected date: 11/08/2024  -     Vitamin B12; Future; Expected date: 11/08/2024  -     Folic Acid Serum (Folate); Future; Expected date: 11/08/2024  2. Well adult exam  -     CBC With Differential With Platelet; Future; Expected date: 11/08/2024  -     Comp Metabolic Panel (14); Future; Expected date: 11/08/2024  -     Lipid Panel; Future; Expected date: 11/08/2024  3. Refused influenza vaccine  -     Influenza Vaccine Refused (Order that documents the process)  4. Hot flashes due to tamoxifen  -     clonazePAM; Take 1 tablet (0.5 mg total) by mouth nightly as needed (insomnia).  Dispense: 30 tablet; Refill: 2  5. Other insomnia  -     clonazePAM; Take 1 tablet (0.5 mg total) by mouth nightly as needed (insomnia).  Dispense: 30 tablet; Refill: 2    Started her on clonazepam to help with sleep. To be used as needed.   Hot flashes, swelling, water retention, likely due to tamoxifen, pt mentioned her oncologist had suggested lower dose of tamoxifen if needed. Advised to discuss if this is a possibility for her at her follow up next month.   Can try magnesium supplement for leg cramps,  keep legs elevated when possible, may try compression socks as well.   Blood  work ordered as above to r/o electrolyte abnormality,thyroid dysfunction      No follow-ups on file.    Indy Malin DO, 11/8/2024, 1:42 PM

## 2024-11-10 DIAGNOSIS — R25.2 LEG CRAMPS: Primary | ICD-10-CM

## 2024-11-10 LAB
ALBUMIN SERPL-MCNC: 4.2 G/DL (ref 3.2–4.8)
ALBUMIN/GLOB SERPL: 1.2 {RATIO} (ref 1–2)
ALP LIVER SERPL-CCNC: 45 U/L
ALT SERPL-CCNC: 38 U/L
ANION GAP SERPL CALC-SCNC: 4 MMOL/L (ref 0–18)
AST SERPL-CCNC: 23 U/L (ref ?–34)
BILIRUB SERPL-MCNC: 0.3 MG/DL (ref 0.3–1.2)
BUN BLD-MCNC: 15 MG/DL (ref 9–23)
CALCIUM BLD-MCNC: 10.1 MG/DL (ref 8.7–10.4)
CHLORIDE SERPL-SCNC: 108 MMOL/L (ref 98–112)
CO2 SERPL-SCNC: 27 MMOL/L (ref 21–32)
CREAT BLD-MCNC: 0.71 MG/DL
EGFRCR SERPLBLD CKD-EPI 2021: 107 ML/MIN/1.73M2 (ref 60–?)
GLOBULIN PLAS-MCNC: 3.5 G/DL (ref 2–3.5)
GLUCOSE BLD-MCNC: 73 MG/DL (ref 70–99)
OSMOLALITY SERPL CALC.SUM OF ELEC: 287 MOSM/KG (ref 275–295)
POTASSIUM SERPL-SCNC: 4.2 MMOL/L (ref 3.5–5.1)
PROT SERPL-MCNC: 7.7 G/DL (ref 5.7–8.2)
SODIUM SERPL-SCNC: 139 MMOL/L (ref 136–145)

## 2024-12-02 ENCOUNTER — OFFICE VISIT (OUTPATIENT)
Dept: HEMATOLOGY/ONCOLOGY | Facility: HOSPITAL | Age: 44
End: 2024-12-02
Attending: INTERNAL MEDICINE
Payer: COMMERCIAL

## 2024-12-02 VITALS
BODY MASS INDEX: 22.22 KG/M2 | DIASTOLIC BLOOD PRESSURE: 85 MMHG | RESPIRATION RATE: 16 BRPM | SYSTOLIC BLOOD PRESSURE: 144 MMHG | TEMPERATURE: 98 F | OXYGEN SATURATION: 99 % | HEART RATE: 91 BPM | WEIGHT: 135 LBS | HEIGHT: 65.51 IN

## 2024-12-02 DIAGNOSIS — Z17.0 MALIGNANT NEOPLASM OF UPPER-OUTER QUADRANT OF RIGHT BREAST IN FEMALE, ESTROGEN RECEPTOR POSITIVE (HCC): Primary | ICD-10-CM

## 2024-12-02 DIAGNOSIS — C50.411 MALIGNANT NEOPLASM OF UPPER-OUTER QUADRANT OF RIGHT BREAST IN FEMALE, ESTROGEN RECEPTOR POSITIVE (HCC): Primary | ICD-10-CM

## 2024-12-02 PROCEDURE — 99214 OFFICE O/P EST MOD 30 MIN: CPT | Performed by: INTERNAL MEDICINE

## 2024-12-02 NOTE — PROGRESS NOTES
Cancer Center Progress Note    Problem List:      Patient Active Problem List   Diagnosis    Atypical ductal hyperplasia of breast    Atypical lobular hyperplasia (ALH) of breast    S/P bilateral mastectomy    Malignant neoplasm of upper-outer quadrant of right breast in female, estrogen receptor positive (HCC)    Special screening for malignant neoplasms, colon    Benign neoplasm of cecum    Benign neoplasm of sigmoid colon    Mass of right breast       Interim History:    Daniela Buenrostro presents today for evaluation and management of a diagnosis of right breast cancer.    She has noticed swelling in her bilateral ankles and feet over the last three weeks. She has had hot flashes since starting tamoxifen. She has been on the tamoxifen for the last six months. The hot flashes are mainly at night and only rarely during the day. She has no focal pain. She has no other new complaints.    She had bilateral mastectomy on 4/19/2023. 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 The ER was >95%, NV was >90%, Ki-67 was <5% and Her2 was 1+. The left breast was negative.      Pathology on 4/19/2023:  Final Diagnosis:   A.  Left breast, mastectomy:  -Fibrocystic change with stromal fibrosis, apocrine cysts, sclerosing adenosis and microcalcifications.     B.  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      -The margins are negative for invasive carcinoma (closest margin posterior at 2.5 mm).  -Flat epithelial atypia and atypical ductal hyperplasia with calcifications.  -Lobular carcinoma in situ and atypical lobular hyperplasia.  -Prior biopsy sites.     C.  Right sentinel lymph nodes, excision:  -Two lymph nodes, negative for tumor (0/2).     D.  Additional right breast tissue, excision:  -Fibrofatty breast tissue with no evidence of tumor.     E.  Left breast skin, excision:  -Skin with no evidence of  tumor.     Addendum 1   Quantitative Immunohistochemistry:   Material:  Block B4  Population:   Tumor Cells     Antibody & Clone             Result Interpretation   Estrogen Receptor   -   SP1 >95 % Positive Cells Strong Positive   Progesterone Receptor   -   16 >90 % Positive Cells Strong Positive   KI-67   -   MM1 <5 % Positive Cells Low   Her2   -   CB11 1+ Negative             Review of Systems:   Constitutional: Negative for anorexia, fatigue, fevers, chills, night sweats and weight loss.  Psychiatric: The patient's mood was calm and appropriate for this visit.  The pertinent positives and negatives were described. All other systems were negative.    PMH/PSH:  Past Medical History:    Anesthesia complication    Atypical ductal hyperplasia of right breast    Body piercing    ears pierced    Cancer (HCC)    Breast cancer    COVID-19    asymptomatic    Enlarged lymph node    right breast    Hemorrhoids    only when pregnant    History of COVID-19    sinus congestion and heacache. not hospitalized, no continued symptoms    PONV (postoperative nausea and vomiting)       Past Surgical History:   Procedure Laterality Date    Renaldo biopsy stereotactic guided        02/15/2017      2018    Oral surgery  1997    wisdom teeth/general anesthesia    Other surgical history  23    Bilateral Mastectomy  Breast Reconstruction       Family History Reviewed:  Family History   Problem Relation Age of Onset    Colon Polyps Father     Other (Other) Mother         Hypothyroid    Heart Disorder Maternal Grandfather     Heart Attack Maternal Grandfather     Prostate Cancer Maternal Grandfather     No Known Problems Paternal Grandmother     Ulcerative Colitis Paternal Grandfather        Allergies:     No Known Allergies    Medications:   clonazePAM 0.5 MG Oral Tab Take 1 tablet (0.5 mg total) by mouth nightly as needed (insomnia). 30 tablet 2    tamoxifen 20 MG Oral Tab Take 1 tablet (20 mg total) by mouth  daily. 90 tablet 3         Vital Signs:    Vitals:    12/02/24 1451   BP: 144/85   Pulse: 91   Resp: 16   Temp: 97.8 °F (36.6 °C)        Performance Status:  ECOG 0: Fully active, able to carry on all pre-disease performance without restriction     Physical Examination:    Constitutional: Patient is alert and oriented x 3, not in acute distress.  Eyes: Anicteric sclera, pink conjunctiva.  HEENT:  Oropharynx is clear. Neck is supple.  Respiratory: Clear to auscultation and percussion. No rales.  No wheezes.  Cardiovascular: Regular rate and rhythm. No murmurs.  Breast: The bilateral chest and implants are intact. There is no mass or skin change.  Gastrointestinal: Soft, non tender with good bowel sounds.  Musculoskeletal: No edema. No calf tenderness.  Skin: No suspicious skin lesion, no rash, no ulceration.  Lymphatics: There is no palpable lymphadenopathy throughout in the cervical, supraclavicular, or axillary regions.  Psychiatric: The patient's mood is calm and appropriate for this visit.       Labs reviewed at this visit:     Lab Results   Component Value Date    WBC 9.0 02/06/2024    RBC 4.36 02/06/2024    HGB 13.0 02/06/2024    HCT 38.9 02/06/2024    MCV 89.2 02/06/2024    MCH 29.8 02/06/2024    MCHC 33.4 02/06/2024    RDW 13.9 02/06/2024    .0 02/06/2024     Lab Results   Component Value Date     11/08/2024    K 4.2 11/08/2024     11/08/2024    CO2 27.0 11/08/2024    BUN 15 11/08/2024    CREATSERUM 0.71 11/08/2024    GLU 73 11/08/2024    CA 10.1 11/08/2024    ALKPHO 45 11/08/2024    ALT 38 11/08/2024    AST 23 11/08/2024    BILT 0.3 11/08/2024    ALB 4.2 11/08/2024    TP 7.7 11/08/2024       Radiologic imaging reviewed at this visit:    MRI Breast on 10/30/2022:  FINDINGS:  Breast composition:  Extremely dense, which lowers the sensitivity of mammography.   Enhancement: Moderate to marked background parenchymal enhancement. This limits MRI sensitivity.       Right breast:  There is focal  more stippled non mass like enhancement in the upper central right breast in the region of additional (non biopsied) calcifications at the 12 o'clock position right breast.  Recommend stereotactic sampling of these calcifications for tissue diagnosis.  No dominant enhancing mass with suspicious kinetics.  Post biopsy changes noted at the 2 o'clock and 4 o'clock position right breast at the site of biopsy proven ADH, (Q clip )and biopsy proven atypical lobular hyperplasia, (top hat clip).  No enlarged right axillary adenopathy. No internal mammary adenopathy.       Left breast:  Numerous scattered enhancing foci throughout the left breast all similar in size and kinetics.  No dominant enhancing focus with suspicious kinetics.  No enlarged left axillary adenopathy.  No internal mammary adenopathy.       Impression   CONCLUSION:  There is marked background parenchymal enhancement which limits MRI sensitivity.       There is stippled non mass like enhancement in the upper central right breast in the region of additional calcifications at the 12 o'clock position which have not undergone biopsy.  Recommend additional stereotactic sampling of these calcifications at 1200 or alternatively these calcifications are amenable to preoperative mammographic wire localization.         There are post biopsy changes at the 0200 and 0400 positions right breast.  No significant surrounding enhancement at the site of biopsy proven ADH ( 0400 Q clip right breast) or ALH ( 2:00 top hat clip) right breast.       No suspicious enhancement in the left breast.      No enlarged axillary or internal mammary adenopathy.       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 She has very favorable pathology  with a very small invasive tubular cancer.  She has been on  tamoxifen 20 mg daily as adjuvant therapy. She has recent lower extremity edema and she has some hot flashes. She will stop the tamoxifen for four weeks and then contact me about her symptoms. I doubt the tamoxifen is causing the edema but we will decide on further management after this pause in therapy. She asked about lower dose tamoxifen but this has not been studied in the adjuvant setting but only the prevention setting. I will plan to see her at six month intervals.        Wilner Sanderson MD

## 2024-12-02 NOTE — PROGRESS NOTES
Patient here for 6 month f/u for breast cancer. Patient taking tamoxifen as directed. Patient states she has insomnia and some bilateral lower extremity edema. That started 3 months ago. Patient denies redness or pain in her legs. Patient states she has increase in leg cramps.     Education Record    Learner:  Patient    Disease / Diagnosis: breast cancer    Barriers / Limitations:  None   Comments:    Method:  Discussion   Comments:    General Topics:  Medication, Side effects and symptom management, and Plan of care reviewed   Comments:    Outcome:  Shows understanding   Comments:

## 2025-03-24 ENCOUNTER — HOSPITAL ENCOUNTER (OUTPATIENT)
Dept: ULTRASOUND IMAGING | Age: 45
Discharge: HOME | End: 2025-03-24
Attending: FAMILY MEDICINE
Payer: COMMERCIAL

## 2025-03-24 DIAGNOSIS — Z90.13 S/P BILATERAL MASTECTOMY: ICD-10-CM

## 2025-03-24 DIAGNOSIS — N60.99 ATYPICAL LOBULAR HYPERPLASIA (ALH) OF BREAST: ICD-10-CM

## 2025-03-24 DIAGNOSIS — N60.99 ATYPICAL LOBULAR HYPERPLASIA (ALH) OF BREAST: Primary | ICD-10-CM

## 2025-03-24 PROCEDURE — 76642 ULTRASOUND BREAST LIMITED: CPT | Performed by: FAMILY MEDICINE

## 2025-03-26 ENCOUNTER — OFFICE VISIT (OUTPATIENT)
Dept: OBGYN CLINIC | Facility: CLINIC | Age: 45
End: 2025-03-26
Payer: COMMERCIAL

## 2025-03-26 VITALS
HEIGHT: 65.5 IN | SYSTOLIC BLOOD PRESSURE: 142 MMHG | HEART RATE: 102 BPM | WEIGHT: 137 LBS | DIASTOLIC BLOOD PRESSURE: 74 MMHG | BODY MASS INDEX: 22.55 KG/M2

## 2025-03-26 DIAGNOSIS — Z12.4 CERVICAL CANCER SCREENING: ICD-10-CM

## 2025-03-26 DIAGNOSIS — Z01.419 WELL WOMAN EXAM WITH ROUTINE GYNECOLOGICAL EXAM: Primary | ICD-10-CM

## 2025-03-26 DIAGNOSIS — R19.00 PELVIC FULLNESS: ICD-10-CM

## 2025-03-26 PROCEDURE — 3077F SYST BP >= 140 MM HG: CPT | Performed by: NURSE PRACTITIONER

## 2025-03-26 PROCEDURE — 3008F BODY MASS INDEX DOCD: CPT | Performed by: NURSE PRACTITIONER

## 2025-03-26 PROCEDURE — 88175 CYTOPATH C/V AUTO FLUID REDO: CPT | Performed by: NURSE PRACTITIONER

## 2025-03-26 PROCEDURE — 3078F DIAST BP <80 MM HG: CPT | Performed by: NURSE PRACTITIONER

## 2025-03-26 PROCEDURE — 99459 PELVIC EXAMINATION: CPT | Performed by: NURSE PRACTITIONER

## 2025-03-26 PROCEDURE — 99396 PREV VISIT EST AGE 40-64: CPT | Performed by: NURSE PRACTITIONER

## 2025-03-26 NOTE — PROGRESS NOTES
Here for Routine Annual Exam  No concerns or questions.  Menses are irregular, they seem to be coming a few days later and later each cycle. She notes it only lasts 2 days but they are heavier than they used to be.  Contraception- none.    ROS: No Cardiac, Respiratory, GI,  or Neurological symptoms.    PE:  GENERAL: well developed, well nourished, in no apparent distress  SKIN: no rashes, no suspicious lesions  HEENT: normal  NECK: supple; no thyroidmegaly, no adenopathy  LUNGS: clear to auscultation  CARDIOVASCULAR: normal S1, S2, RRR  BREASTS: firm, nontendder, no palpable masses or nodes, no nipple discharge, no skin changes, no axillary adenopathy,    ABDOMEN: Soft, non distended; non tender, no masses  GYNE/: External Genitalia: Normal without lesions or erythema                      Vagina: normal without lesions, scant discharge                      Uterus: mid, mobile, non tender, normal size                     Cervix: no lesions or CMT                     Adnexa: non tender, no masses, normal size  EXTREMITIES:  non tender without edema    A/P:   1. Well woman exam with routine gynecological exam  Increase calcium intake, supplement with vitamin D, weight bearing exercises regularly    2. Cervical cancer screening  - ThinPrep PAP with HPV Reflex Request; Future    3. Pelvic fullness  - US PELVIS W EV (CPT=76856/27150); Future       Return to clinic 1 year for routine exam, or as needed with any concerns or question

## 2025-03-27 ENCOUNTER — PATIENT MESSAGE (OUTPATIENT)
Dept: FAMILY MEDICINE CLINIC | Facility: CLINIC | Age: 45
End: 2025-03-27

## 2025-03-27 DIAGNOSIS — Z00.00 WELL ADULT EXAM: Primary | ICD-10-CM

## 2025-03-29 ENCOUNTER — LAB ENCOUNTER (OUTPATIENT)
Dept: LAB | Age: 45
End: 2025-03-29
Attending: FAMILY MEDICINE
Payer: COMMERCIAL

## 2025-03-29 DIAGNOSIS — Z00.00 WELL ADULT EXAM: ICD-10-CM

## 2025-03-29 LAB
ALBUMIN SERPL-MCNC: 4.2 G/DL (ref 3.2–4.8)
ALBUMIN/GLOB SERPL: 1.7 {RATIO} (ref 1–2)
ALP LIVER SERPL-CCNC: 40 U/L
ALT SERPL-CCNC: 44 U/L
ANION GAP SERPL CALC-SCNC: 8 MMOL/L (ref 0–18)
AST SERPL-CCNC: 25 U/L (ref ?–34)
BASOPHILS # BLD AUTO: 0.05 X10(3) UL (ref 0–0.2)
BASOPHILS NFR BLD AUTO: 0.8 %
BILIRUB SERPL-MCNC: 0.7 MG/DL (ref 0.3–1.2)
BUN BLD-MCNC: 14 MG/DL (ref 9–23)
CALCIUM BLD-MCNC: 9.5 MG/DL (ref 8.7–10.6)
CHLORIDE SERPL-SCNC: 108 MMOL/L (ref 98–112)
CHOLEST SERPL-MCNC: 163 MG/DL (ref ?–200)
CO2 SERPL-SCNC: 27 MMOL/L (ref 21–32)
CREAT BLD-MCNC: 0.77 MG/DL
EGFRCR SERPLBLD CKD-EPI 2021: 96 ML/MIN/1.73M2 (ref 60–?)
EOSINOPHIL # BLD AUTO: 0.16 X10(3) UL (ref 0–0.7)
EOSINOPHIL NFR BLD AUTO: 2.5 %
ERYTHROCYTE [DISTWIDTH] IN BLOOD BY AUTOMATED COUNT: 13.8 %
FASTING PATIENT LIPID ANSWER: YES
FASTING STATUS PATIENT QL REPORTED: YES
GLOBULIN PLAS-MCNC: 2.5 G/DL (ref 2–3.5)
GLUCOSE BLD-MCNC: 89 MG/DL (ref 70–99)
HCT VFR BLD AUTO: 39.4 %
HDLC SERPL-MCNC: 80 MG/DL (ref 40–59)
HGB BLD-MCNC: 12.9 G/DL
IMM GRANULOCYTES # BLD AUTO: 0.02 X10(3) UL (ref 0–1)
IMM GRANULOCYTES NFR BLD: 0.3 %
LDLC SERPL CALC-MCNC: 65 MG/DL (ref ?–100)
LYMPHOCYTES # BLD AUTO: 2.5 X10(3) UL (ref 1–4)
LYMPHOCYTES NFR BLD AUTO: 39.4 %
MCH RBC QN AUTO: 30.4 PG (ref 26–34)
MCHC RBC AUTO-ENTMCNC: 32.7 G/DL (ref 31–37)
MCV RBC AUTO: 92.7 FL
MONOCYTES # BLD AUTO: 0.7 X10(3) UL (ref 0.1–1)
MONOCYTES NFR BLD AUTO: 11 %
NEUTROPHILS # BLD AUTO: 2.91 X10 (3) UL (ref 1.5–7.7)
NEUTROPHILS # BLD AUTO: 2.91 X10(3) UL (ref 1.5–7.7)
NEUTROPHILS NFR BLD AUTO: 46 %
NONHDLC SERPL-MCNC: 83 MG/DL (ref ?–130)
OSMOLALITY SERPL CALC.SUM OF ELEC: 296 MOSM/KG (ref 275–295)
PLATELET # BLD AUTO: 239 10(3)UL (ref 150–450)
POTASSIUM SERPL-SCNC: 4.2 MMOL/L (ref 3.5–5.1)
PROT SERPL-MCNC: 6.7 G/DL (ref 5.7–8.2)
RBC # BLD AUTO: 4.25 X10(6)UL
SODIUM SERPL-SCNC: 143 MMOL/L (ref 136–145)
TRIGL SERPL-MCNC: 100 MG/DL (ref 30–149)
VLDLC SERPL CALC-MCNC: 15 MG/DL (ref 0–30)
WBC # BLD AUTO: 6.3 X10(3) UL (ref 4–11)

## 2025-03-29 PROCEDURE — 85025 COMPLETE CBC W/AUTO DIFF WBC: CPT | Performed by: FAMILY MEDICINE

## 2025-03-29 PROCEDURE — 80053 COMPREHEN METABOLIC PANEL: CPT | Performed by: FAMILY MEDICINE

## 2025-03-29 PROCEDURE — 80061 LIPID PANEL: CPT | Performed by: FAMILY MEDICINE

## 2025-04-01 PROBLEM — C50.411 MALIGNANT NEOPLASM OF UPPER-OUTER QUADRANT OF RIGHT BREAST IN FEMALE, ESTROGEN RECEPTOR POSITIVE (HCC): Status: RESOLVED | Noted: 2023-05-14 | Resolved: 2025-04-01

## 2025-04-01 PROBLEM — Z17.0 MALIGNANT NEOPLASM OF UPPER-OUTER QUADRANT OF RIGHT BREAST IN FEMALE, ESTROGEN RECEPTOR POSITIVE (HCC): Status: RESOLVED | Noted: 2023-05-14 | Resolved: 2025-04-01

## 2025-04-01 PROBLEM — Z85.3 HISTORY OF BREAST CANCER: Status: ACTIVE | Noted: 2025-04-01

## 2025-04-09 NOTE — PROGRESS NOTES
Note to patient: The 21st Century Cures Act makes medical notes like these available to patients in the interest of transparency. However, be advised this is a medical document. It is intended as peer to peer communication. It is written in medical language and may contain abbreviations or verbiage that are unfamiliar. It may appear blunt or direct. Medical documents are intended to carry relevant information, facts as evident, and the clinical opinion of the practitioner.         Chief Complaint   Patient presents with    Physical     Patient will check with insurance if she can get shingles and prevnar        HPI:  44-year-old female with past medical history of breast cancer s/p bilateral mastectomy with reconstruction in 2023. presents for annual physical examination.     Labs reviewed:  All normal.     Regular menses  She is on tamoxifen. Following Dr. Sanderson every 6 months.   She will be needing a repeat right breast US in 6 months to evaluate the lymph node- this has been ordered.   Pt has pelvic US scheduled for pelvic fullness    Father had hx of 2 blood clots in his legs. He has factor V disease.     She reports bilateral   inguinal swelling and b/l  leg swelling that's been going on since Aug 2023. She had fat transfer done from that site for her breast surgery. Denies hx of clots. Denies pain/redness. Has occasionally tried using compression socks.No family history of CHF.  No personal history of shortness of breath chest pain or known heart problems.       She reports since the tamoxifen periods are heavier and short.       Smoking: none  Alcohol: none   Drugs: none   Sexual hx: 1 partner   STD hx: declined  No family history of colon cancer or breast cancer.  Colonoscopy in 2024- sessile polyp and tubular adenoma- repeat in 5 years.   Pap smear: March 2025 normal  Tdap: 2018  Patient is a healthy diet and exercises regularly.     Review of Systems   Constitutional: Negative for fever, chills and fatigue.  No distress.  HENT: Negative for hearing loss, congestion, sore throat, neck pain   Eyes: Negative for pain and visual disturbance.   Respiratory: Negative for cough, chest tightness, shortness of breath and wheezing.    Cardiovascular: Negative for chest pain, palpitations   Gastrointestinal: Negative for nausea, vomiting, abdominal pain, diarrhea, blood in stool and abdominal distention.   Genitourinary: Negative for dysuria, hematuria and difficulty urinating.     Patient Active Problem List   Diagnosis    Atypical ductal hyperplasia of breast    Atypical lobular hyperplasia (ALH) of breast    S/P bilateral mastectomy    Special screening for malignant neoplasms, colon    Benign neoplasm of cecum    Benign neoplasm of sigmoid colon    Mass of right breast    History of breast cancer       Past Medical History:    Anesthesia complication    Atypical ductal hyperplasia of right breast    Body piercing    ears pierced    Cancer (HCC)    Breast cancer    COVID-19    asymptomatic    Enlarged lymph node    right breast    Hemorrhoids    only when pregnant    History of COVID-19    sinus congestion and heacache. not hospitalized, no continued symptoms    PONV (postoperative nausea and vomiting)     Past Surgical History:   Procedure Laterality Date    Breast biopsy  2022    Breast reconstruc w tiss expandr  23    Breast reconstruction  23    Colonoscopy  2024    Renaldo biopsy stereo nodule 1 site right (cpt=19081)  2022, 2022, 2024    Renaldo biopsy stereotactic guided      Mastectomy left  23    Mastectomy right  23      02/15/2017      2018    Oral surgery      wisdom teeth/general anesthesia    Other surgical history  23    Bilateral Mastectomy  Breast Reconstruction     Family History   Problem Relation Age of Onset    Bleeding Disorders Father         Blood Clots    Colon Polyps Father     Other (factor v) Father     Other (Other)  Mother         Hypothyroid    Heart Disorder Maternal Grandfather     Heart Attack Maternal Grandfather     Prostate Cancer Maternal Grandfather     Cancer Maternal Grandfather     Heart Disease Maternal Grandfather     No Known Problems Paternal Grandmother     Ulcerative Colitis Paternal Grandfather      Social History     Socioeconomic History    Marital status:     Number of children: 2   Tobacco Use    Smoking status: Never    Smokeless tobacco: Never   Vaping Use    Vaping status: Never Used   Substance and Sexual Activity    Alcohol use: No    Drug use: Never    Sexual activity: Yes     Partners: Male   Other Topics Concern    Caffeine Concern No    Exercise Yes    Seat Belt Yes    Special Diet No    Stress Concern No    Weight Concern No     Social Drivers of Health     Food Insecurity: No Food Insecurity (3/26/2025)    NCSS - Food Insecurity     Worried About Running Out of Food in the Last Year: No     Ran Out of Food in the Last Year: No   Transportation Needs: No Transportation Needs (3/26/2025)    NCSS - Transportation     Lack of Transportation: No   Housing Stability: Not At Risk (3/26/2025)    NCSS - Housing/Utilities     Has Housing: Yes     Worried About Losing Housing: No     Unable to Get Utilities: No       Current Outpatient Medications   Medication Sig Dispense Refill    clonazePAM 0.5 MG Oral Tab Take 1 tablet (0.5 mg total) by mouth nightly as needed (insomnia). 30 tablet 2    tamoxifen 20 MG Oral Tab Take 1 tablet (20 mg total) by mouth daily. 90 tablet 3       Allergies  No Known Allergies    Health Maintenance  Immunizations:  Immunization History   Administered Date(s) Administered    Covid-19 Vaccine Pfizer 30 mcg/0.3 ml 04/08/2021, 04/29/2021, 01/19/2022    FLUZONE 6 months and older PFS 0.5 ml (36974) 11/14/2016    TDAP 12/14/2016, 04/03/2018   Pended Date(s) Pended    Influenza Vaccine Refused 11/08/2024         Physical Exam  /62   Pulse 85   Temp 97.2 °F (36.2 °C)  (Temporal)   Resp 16   Ht 5' 5.5\" (1.664 m)   Wt 136 lb (61.7 kg)   LMP 03/15/2025 (Exact Date)   SpO2 100%   BMI 22.29 kg/m²   Constitutional: Oriented to person, place, and time. No distress.   HEENT:  Normocephalic and atraumatic. Hearing and tympanic membranes normal.  Nose normal. Oropharynx is clear and moist.   Eyes:  PERRLA. No scleral icterus.   Neck:  No mass and no thyromegaly present.   Cardiovascular: Normal rate, regular rhythm and intact distal pulses.  No murmur, rubs or gallops.   Pulmonary/Chest: Effort normal and breath sounds normal. No respiratory distress. No wheezes, rhonchi or rales  Abdominal: Soft. Bowel sounds are normal. Non tender, no masses, no organomegaly or hernias.  Musculoskeletal: No edema and no tenderness.    Bilateral inguinal fullness.  Bilateral lower extremities from ankle to inguinal area trace edema.  No varicose veins.  Nontender to touch.    A/P:    Encounter Diagnoses   Name Primary?    Bilateral leg edema Yes    Family history of factor V deficiency     Well adult exam     Atypical lobular hyperplasia (ALH) of breast     Malignant neoplasm of upper-outer quadrant of right breast in female, estrogen receptor positive (HCC)     S/P bilateral mastectomy     Lymphatic edema      1. Bilateral leg edema  Suspect lymphatic disruption from fat transfer.   Compression, elevation recommended.   See lymphatic clinic.   - Lymphedema Clinic Referral:  PT/OT  Evaluate & Treat Lymphedema       2. Family history of factor V deficiency  Father has a history of factor V deficiency.  Father's doctor suggested that children get tested as well.  Will place order for this test.  - Factor V Leiden Mutation [E]; Future    3. Well adult exam  - -Discussed diet and exercise, counseled on vaccine and screening guidelines.     4. Atypical lobular hyperplasia (ALH) of breast  Diagnosed in 2023 status post bilateral mastectomy with reconstruction    5. Malignant neoplasm of upper-outer  quadrant of right breast in female, estrogen receptor positive (HCC)  Status postmastectomy with reconstruction.  Now under surveillance with 6-month right breast ultrasound ordered due to lymph node that is present.    6. S/P bilateral mastectomy    - Lymphedema Clinic Referral:  PT/OT  Evaluate & Treat Lymphedema    7. Lymphatic edema    - Lymphedema Clinic Referral:  PT/OT  Evaluate & Treat Lymphedema        Orders Placed This Encounter   Procedures    Factor V Leiden Mutation [E]       Meds & Refills for this Visit:  Requested Prescriptions      No prescriptions requested or ordered in this encounter       Imaging & Consults:  OP REFERRAL TO OT/PT LYMPHEDEMA CLINIC      No follow-ups on file.    There are no Patient Instructions on file for this visit.    All questions were answered and the patient understands the plan.     Indy Malin,         This note was prepared using Dragon Medical voice recognition dictation software. As a result errors may occur. When identified these errors have been corrected. While every attempt is made to correct errors during dictation discrepancies may still exist.

## 2025-04-10 ENCOUNTER — OFFICE VISIT (OUTPATIENT)
Dept: FAMILY MEDICINE CLINIC | Facility: CLINIC | Age: 45
End: 2025-04-10
Payer: COMMERCIAL

## 2025-04-10 VITALS
DIASTOLIC BLOOD PRESSURE: 62 MMHG | WEIGHT: 136 LBS | TEMPERATURE: 97 F | SYSTOLIC BLOOD PRESSURE: 120 MMHG | BODY MASS INDEX: 22.39 KG/M2 | HEART RATE: 85 BPM | HEIGHT: 65.5 IN | OXYGEN SATURATION: 100 % | RESPIRATION RATE: 16 BRPM

## 2025-04-10 DIAGNOSIS — C50.411 MALIGNANT NEOPLASM OF UPPER-OUTER QUADRANT OF RIGHT BREAST IN FEMALE, ESTROGEN RECEPTOR POSITIVE (HCC): ICD-10-CM

## 2025-04-10 DIAGNOSIS — Z90.13 S/P BILATERAL MASTECTOMY: ICD-10-CM

## 2025-04-10 DIAGNOSIS — Z00.00 WELL ADULT EXAM: ICD-10-CM

## 2025-04-10 DIAGNOSIS — I89.0 LYMPHATIC EDEMA: ICD-10-CM

## 2025-04-10 DIAGNOSIS — Z17.0 MALIGNANT NEOPLASM OF UPPER-OUTER QUADRANT OF RIGHT BREAST IN FEMALE, ESTROGEN RECEPTOR POSITIVE (HCC): ICD-10-CM

## 2025-04-10 DIAGNOSIS — N60.99 ATYPICAL LOBULAR HYPERPLASIA (ALH) OF BREAST: ICD-10-CM

## 2025-04-10 DIAGNOSIS — Z83.2 FAMILY HISTORY OF FACTOR V DEFICIENCY: ICD-10-CM

## 2025-04-10 DIAGNOSIS — R60.0 BILATERAL LEG EDEMA: Primary | ICD-10-CM

## 2025-04-14 PROBLEM — N60.99 ATYPICAL LOBULAR HYPERPLASIA (ALH) OF BREAST: Status: RESOLVED | Noted: 2022-10-04 | Resolved: 2025-04-14

## 2025-04-16 PROBLEM — Z85.3 HISTORY OF RIGHT BREAST CANCER: Status: ACTIVE | Noted: 2025-04-01

## 2025-04-23 ENCOUNTER — TELEPHONE (OUTPATIENT)
Dept: PHYSICAL THERAPY | Facility: HOSPITAL | Age: 45
End: 2025-04-23

## 2025-04-23 ENCOUNTER — HOSPITAL ENCOUNTER (OUTPATIENT)
Dept: ULTRASOUND IMAGING | Age: 45
Discharge: HOME | End: 2025-04-23
Attending: NURSE PRACTITIONER
Payer: COMMERCIAL

## 2025-04-23 ENCOUNTER — ORDER TRANSCRIPTION (OUTPATIENT)
Dept: PHYSICAL THERAPY | Facility: HOSPITAL | Age: 45
End: 2025-04-23

## 2025-04-23 DIAGNOSIS — R19.00 PELVIC FULLNESS: ICD-10-CM

## 2025-04-23 DIAGNOSIS — I89.0 LYMPHATIC EDEMA: ICD-10-CM

## 2025-04-23 DIAGNOSIS — Z90.13 S/P BILATERAL MASTECTOMY: ICD-10-CM

## 2025-04-23 DIAGNOSIS — R60.0 BILATERAL LEG EDEMA: Primary | ICD-10-CM

## 2025-04-23 PROCEDURE — 76856 US EXAM PELVIC COMPLETE: CPT | Performed by: NURSE PRACTITIONER

## 2025-04-23 PROCEDURE — 76830 TRANSVAGINAL US NON-OB: CPT | Performed by: NURSE PRACTITIONER

## 2025-04-24 ENCOUNTER — TELEPHONE (OUTPATIENT)
Dept: PHYSICAL THERAPY | Facility: HOSPITAL | Age: 45
End: 2025-04-24

## 2025-04-29 ENCOUNTER — OFFICE VISIT (OUTPATIENT)
Dept: OCCUPATIONAL MEDICINE | Facility: HOSPITAL | Age: 45
End: 2025-04-29
Attending: FAMILY MEDICINE
Payer: COMMERCIAL

## 2025-04-29 DIAGNOSIS — I89.0 LYMPHATIC EDEMA: ICD-10-CM

## 2025-04-29 DIAGNOSIS — Z90.13 S/P BILATERAL MASTECTOMY: ICD-10-CM

## 2025-04-29 DIAGNOSIS — R60.0 BILATERAL LEG EDEMA: Primary | ICD-10-CM

## 2025-04-29 PROCEDURE — 97535 SELF CARE MNGMENT TRAINING: CPT

## 2025-04-29 PROCEDURE — 97166 OT EVAL MOD COMPLEX 45 MIN: CPT

## 2025-04-29 NOTE — PROGRESS NOTES
LYMPHEDEMA EVAL:     Diagnosis:   Associated DX:  Bilateral leg edema (R60.0)  S/P bilateral mastectomy (Z90.13)  Lymphatic edema (I89.0) Patient:  Daniela Buenrostro (44 year old, female)        Referring Provider: Indy Malin  Today's Date   4/29/2025    Precautions:      Date of Evaluation: 04/29/25  Date of Surgery: April 2023 mastectomy and reconstruction in 2023. fat grafting was in august in 2023     PATIENT SUMMARY   Summary of chief complaints: ankle swelling  History of current condition: She is on tamoxifen. Following Dr. Sanderson every 6 months.   She will be needing a repeat right breast US in 6 months to evaluate the lymph node- this has been ordered.   Pt has pelvic US scheduled for pelvic fullness     Father had hx of 2 blood clots in his legs. He has factor V disease.      She reports bilateral   inguinal swelling and b/l  leg swelling that's been going on since Aug 2023. She had fat transfer done from that site for her breast surgery. Denies hx of clots. Denies pain/redness. Has occasionally tried using compression socks.No family history of CHF.  No personal history of shortness of breath chest pain or known heart problems.   Pain level: current 1 /10, at best 1 /10, at worst 2 /10 (wakes her up at night)  Description of symptoms: ankle swelling in both feet and knees   Occupation: works for her  and takes care of her 2 young children  Leisure activities/Hobbies: reading   Prior level of function: Independent  Current limitations: reports no functional limitations  Pt goals: to learn more strategies to reduce the swelling  Hand Dominance:  right   Do you live with someone who would be able to assist you: Yes  Self-Reported Weight: 136 lb  Are you following the recommended diet from your physician: Yes    Past medical history was reviewed with Daniela.  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  She  has a past surgical history that includes oral surgery ();  (02/15/2017);  (2018); darleen biopsy stereotactic guided; other surgical history (23); colonoscopy (2024); mastectomy right (23); mastectomy left (23); breast reconstruc w tiss expandr (23); darleen biopsy stereo nodule 1 site right (cpt=19081) (2022, 2022, 2024); Breast biopsy (2022); and Breast reconstruction (23).    ASSESSMENT  Daniela presents to occupational therapy evaluation with primary c/o ankle swelling. The results of the objective tests and measures show patient's volume is higher on the Left LE comparing to the right. Patient reports no functional  defecits, but dicomfort in her ankles due to swelling impacting her ability to wear sock and shoes comfortably.. Signs and symptoms are consistent with a rehabilitation diagnosis of Associated DX:  Bilateral leg edema (R60.0)  S/P bilateral mastectomy (Z90.13)  Lymphatic edema (I89.0). Pt and OT discussed evaluation findings, pathology, and POC.  Pt voiced understanding of plan of care and agrees to participate in self-care including HEP and progression towards self-management. Skilled Occupational Therapy is medically necessary to address the above impairments and reach functional goals.    Reason for lymphedema: Secondary Lymphedema  Stage of lymphedema: 1  Phase of treatment: Phase 1: Reduction Phase    OBJECTIVE:   Musculoskeletal  Posture:  fair  Special Tests:  negative Stemmer sign     ROM and Strength:  (* denotes performed with pain)  Knee   ROM MMT (-/5)    R L R L     Flex wnl wnl         Ext (L3) wnl nwl            Ankle/Foot   ROM MMT (-/5)    R L R L     PF wnl nwl         DF (L4) wnl            Inversion             Eversion             Grt Toe Ext (L5)               ADLs/IADLs:  ADL's    Bathing:  Independent     Dressing:  Independent    Feeding:  Independent     Grooming:  Independent  IADL's     Homemaking:  Independent     Food Prep:   Independent    Driving:   Independent  Other Functional Mobility/ADL Comments:      Swelling          Stemmer Sign:  Negative     Trunk Measurements           Lymphedema Measurements       4/29/2025   OT Lymphedema Calculations   10cm above SBP 45 cm   SBP 39.8 cm   5cm BELOW SBP 39.2 cm   10cm below SBP 33.5 cm   20cm below SBP 36.2 cm   30cm below SBP 29 cm   35cm below SBP 23.8 cm   40cm below SBP 22.4 cm   Lateral Malleoli 25.6 cm   7cm prox to 1st toe web space 22 cm   5cm prox to 1st toe web space 20.9 cm   RLE Total 337.4 cm   10cm above SBP 46.5 cm   SBP 41.5 cm   5cm below SBP 37.5 cm   10cm below SBP 34.5 cm   20cm below SBP 36.5 cm   30cm below SBP 31 cm   35cm below SBP 24.1 cm   40cm below SBP 22.8 cm   Lateral Malleoli 25.1 cm   7cm prox to 1st toe web space 23 cm   5cm prox to 1st toe web space 21.1 cm   LLE Total 343.6 cm        Neurological:  Sensation:  intact     Gait: pt ambulates on level ground with  no assistive device     Today's Treatment and Response:   Pt education was provided on exam findings, treatment diagnosis, treatment plan, expectations, and prognosis.  Today's Treatment       4/29/2025   OT Treatment   Self Care Home Mgmt Time spent discussing the anatomy and functions of the Lymphatic system. Discussed different compression options during the reduction phase of treatment, such as bandaging, velcro reduction wraps and a trial of tensogrip. Tensogrip applied today with size D up to the knee and size C overlapping on the foot and ankle.   Self Care Home Mgmt Min 20   Total Timed Procedures 20   Total Service Procedures 20   Total Time 20         Patient was instructed in and issued a HEP for:      Charges:  OT EVAL Moderate  Complexity, moderate complexity eval x 1, self care x 1  Based on analysis of data from a detailed assessment from an expanded chart review, clinical presentation of physical, cognitive and psychosocial skills, as well as review of patient rated outcome measures, this evaluation involved Moderate complexity decision making, with 3-5 occupational performance component deficits, possible comorbidities, and minimal to moderate need for modification of tasks or assistance.                                                                  PLAN OF CARE:    Goals: (to be met in 15 visits)    1 Pt will be independent in daily skin care.  2 Pt will tolerate right and left  modified compression via Tensogrip sleeve for 6-8 hours.  3 Pt will be independent in decongestive exercises.  4 Pt will be independent in self-manual lymph drainage.  5 Pt will tolerate right and left  LE short stretch compression bandaging/velcro reduction  wraps  for 20-23 hours.  6 Pt/Caregiver will be independent in right and left  LE short stretch compression bandaging/velcro reduction wraps   7 Reduce right and left  LE lymphedema volume by 10 cm to allow pt to wear shoes and socks more comfortably .  8 Reduce right and left  LE lymphedema density at the ankle  to soft and supple  to reduce infection risk.  9 Pt will be independent in use of compression garments, self-manual lymph drainage, decongestive exercises and lymphedema precautions for life-long self-management of lymphedema.      Frequency / Duration: Patient will be seen 1-2 x per weekx/week or a total of 15 visits over a 90 day period. Treatment will include: MLD; Compression; Skin care; Manual therapy; Therapeutic exercises; Home exercise program instructions    Education or treatment limitation: None   Rehab Potential: good     LLIS SCORES    Q1-Q17 Score #of Q's Answered Raw Score Percent Impairment      15    17    0.88    0.88       Patient/Family/Caregiver was advised of these  findings, precautions, and treatment options and has agreed to actively participate in planning and for this course of care.    Thank you for your referral. Please co-sign or sign and return this letter via fax as soon as possible to [phone]. If you have any questions, please contact me at Dept: [phone]    Sincerely,  Electronically signed by therapist: Meenakshi Ng, OT  Physician's certification required: Yes  I certify the need for these services furnished under this plan of treatment and while under my care.    X___________________________________________________ Date____________________    Certification From: 4/29/2025  To:7/28/2025

## 2025-05-13 ENCOUNTER — OFFICE VISIT (OUTPATIENT)
Dept: OCCUPATIONAL MEDICINE | Facility: HOSPITAL | Age: 45
End: 2025-05-13
Attending: FAMILY MEDICINE
Payer: COMMERCIAL

## 2025-05-13 PROCEDURE — 97140 MANUAL THERAPY 1/> REGIONS: CPT

## 2025-05-14 NOTE — PROGRESS NOTES
Patient: Daniela Buenrostro (44 year old, female) Referring Provider:  Insurance:   Diagnosis: Associated DX:  Bilateral leg edema (R60.0)  S/P bilateral mastectomy (Z90.13)  Lymphatic edema (I89.0) Indy Orozcoammed  [employer] POS   Date of Surgery: April 2023 mastectomy and reconstruction in 2023. fat grafting was in august in 2023 Next MD visit:  N/A   Precautions:    No data recorded Referral Information:   Date of Injury: No data recorded Date of Evaluation: Req: 0, Auth: 0, Exp:     04/29/25 POC Auth Visits:  15      Today's Date   5/13/2025    Subjective  Patient reports pain in her left leg and calf       Pain: 2/10     Objective  Farrow Wrap measurements   extra small tall leg wrap  foot piece and extra small regular foot piece       Assessment  Patient tolerated her first MLD session moderately well. She will benefit from farrow wraps during the reduction phase of treatment    Goals (to be met in 15 visits))   1 Pt will be independent in daily skin care.  2 Pt will tolerate right and left  modified compression via Tensogrip sleeve for 6-8 hours.  3 Pt will be independent in decongestive exercises.  4 Pt will be independent in self-manual lymph drainage.  5 Pt will tolerate right and left  LE short stretch compression bandaging/velcro reduction  wraps  for 20-23 hours.  6 Pt/Caregiver will be independent in right and left  LE short stretch compression bandaging/velcro reduction wraps   7 Reduce right and left  LE lymphedema volume by 10 cm to allow pt to wear shoes and socks more comfortably .  8 Reduce right and left  LE lymphedema density at the ankle  to soft and supple  to reduce infection risk.  9 Pt will be independent in use of compression garments, self-manual lymph drainage, decongestive exercises and lymphedema precautions for life-long self-management of lymphedema.          Plan  Patient will be seen 1-2 x per weekx/week or a total of 15 visits over a 90 day period. Treatment will include: MLD;  Compression; Skin care; Manual therapy; Therapeutic exercises; Home exercise program instructions    Treatment Last 4 Visits       4/29/2025 5/13/2025   OT Treatment   Treatment Day  2   Therapeutic Exercise  Heel raises  Ankle pumps  Toe crunches    Manual Therapy  Deep diaphragmatic breathing   Measurements for Farrow wraps   Reviewed decongestive exercises  Discussed the anatomy and functions of the lymphatic system during MLD  MLD to right and left lower quadrant   Applied tensogrip sleeves end of session   Self Care Home Mgmt Time spent discussing the anatomy and functions of the Lymphatic system. Discussed different compression options during the reduction phase of treatment, such as bandaging, velcro reduction wraps and a trial of tensogrip. Tensogrip applied today with size D up to the knee and size C overlapping on the foot and ankle.    Manual Therapy Min  55   Self Care Home Mgmt Min 20    Total Timed Procedures 20 55   Total Service Procedures 20 55   Total Time 20 55         HEP  Ankle pumps  Toe crunches  Heel raises     Charges     manual therapy x 4    55

## 2025-05-15 ENCOUNTER — TELEPHONE (OUTPATIENT)
Dept: PHYSICAL THERAPY | Facility: HOSPITAL | Age: 45
End: 2025-05-15

## 2025-05-16 ENCOUNTER — OFFICE VISIT (OUTPATIENT)
Dept: OCCUPATIONAL MEDICINE | Facility: HOSPITAL | Age: 45
End: 2025-05-16
Attending: FAMILY MEDICINE
Payer: COMMERCIAL

## 2025-05-16 PROCEDURE — 97110 THERAPEUTIC EXERCISES: CPT

## 2025-05-16 PROCEDURE — 97535 SELF CARE MNGMENT TRAINING: CPT

## 2025-05-16 NOTE — PROGRESS NOTES
Patient: Daniela Buenrostro (44 year old, female) Referring Provider:  Insurance:   Diagnosis: Associated DX:  Bilateral leg edema (R60.0)  S/P bilateral mastectomy (Z90.13)  Lymphatic edema (I89.0) No ref. provider found  [employer] POS   Date of Surgery: April 2023 mastectomy and reconstruction in 2023. fat grafting was in august in 2023 Next MD visit:  N/A   Precautions:    No data recorded Referral Information:   Date of Injury: No data recorded Date of Evaluation: Req: 0, Auth: 0, Exp:     04/29/25 POC Auth Visits:  15      Today's Date   5/16/2025    Subjective  Patient reports that she did order the Farrow leg and foot wraps       Pain: 1/10     Objective  Patient attends therapy today with her tensogrip sleeves on her right and left leg       Assessment  pt. is demonstrating a good understanding of self massage and decongestive exercises    Goals (to be met in 15 visits))   1 Pt will be independent in daily skin care.  2 Pt will tolerate right and left  modified compression via Tensogrip sleeve for 6-8 hours.  3 Pt will be independent in decongestive exercises.  4 Pt will be independent in self-manual lymph drainage.  5 Pt will tolerate right and left  LE short stretch compression bandaging/velcro reduction  wraps  for 20-23 hours.  6 Pt/Caregiver will be independent in right and left  LE short stretch compression bandaging/velcro reduction wraps   7 Reduce right and left  LE lymphedema volume by 10 cm to allow pt to wear shoes and socks more comfortably .  8 Reduce right and left  LE lymphedema density at the ankle  to soft and supple  to reduce infection risk.  9 Pt will be independent in use of compression garments, self-manual lymph drainage, decongestive exercises and lymphedema precautions for life-long self-management of lymphedema.              Plan  Patient will be seen 1-2 x per weekx/week or a total of 15 visits over a 90 day period. Treatment will include: MLD; Compression; Skin care; Manual therapy;  Therapeutic exercises; Home exercise program instructions    Treatment Last 4 Visits       4/29/2025 5/13/2025 5/16/2025   OT Treatment   Treatment Day  2 3   Therapeutic Exercise  Heel raises  Ankle pumps  Toe crunches     Manual Therapy  Deep diaphragmatic breathing   Measurements for Farrow wraps   Reviewed decongestive exercises  Discussed the anatomy and functions of the lymphatic system during MLD  MLD to right and left lower quadrant   Applied tensogrip sleeves end of session Deep diaphragmatic breathing    MLD to bilateral Lower quadrant   Discussed the anatomy and functions of the lymphatic system during session   Self Care Home Mgmt Time spent discussing the anatomy and functions of the Lymphatic system. Discussed different compression options during the reduction phase of treatment, such as bandaging, velcro reduction wraps and a trial of tensogrip. Tensogrip applied today with size D up to the knee and size C overlapping on the foot and ankle.  Pt. Instructed in decongestive exercises   Self massage and how to don the farrow wraps once she receives them    Manual Therapy Min  55 30   Self Care Home Mgmt Min 20  30   Total Timed Procedures 20 55 60   Total Service Procedures 20 55 60   Total Time 20 55 60         HEP  Self massage of the LE  Decongestive and therapeutic exercises    Toe crunches, ankle pumps and heel raises     Charges     self care x 2, manual therapy x 2    55

## 2025-05-20 ENCOUNTER — APPOINTMENT (OUTPATIENT)
Dept: OCCUPATIONAL MEDICINE | Facility: HOSPITAL | Age: 45
End: 2025-05-20
Attending: FAMILY MEDICINE
Payer: COMMERCIAL

## 2025-05-23 ENCOUNTER — OFFICE VISIT (OUTPATIENT)
Dept: OCCUPATIONAL MEDICINE | Facility: HOSPITAL | Age: 45
End: 2025-05-23
Attending: FAMILY MEDICINE
Payer: COMMERCIAL

## 2025-05-23 PROCEDURE — 97140 MANUAL THERAPY 1/> REGIONS: CPT

## 2025-05-23 PROCEDURE — 97535 SELF CARE MNGMENT TRAINING: CPT

## 2025-05-23 NOTE — PROGRESS NOTES
Patient: Daniela Buenrostro (44 year old, female) Referring Provider:  Insurance:   Diagnosis: Associated DX:  Bilateral leg edema (R60.0)  S/P bilateral mastectomy (Z90.13)  Lymphatic edema (I89.0) No ref. provider found  [employer] POS   Date of Surgery: April 2023 mastectomy and reconstruction in 2023. 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 She reports that her lower legs did swell more.         Pain:  0/10     Objective  Patient attends therapy today with her new farrow wraps       Assessment  Patient's new farrow wraps fit well, and pt. demonstrates a good understanding of how to don/doff the wraps    Goals (to be met in 15 visits))   1 Pt will be independent in daily skin care.  2 Pt will tolerate right and left  modified compression via Tensogrip sleeve for 6-8 hours.  3 Pt will be independent in decongestive exercises.  4 Pt will be independent in self-manual lymph drainage.  5 Pt will tolerate right and left  LE short stretch compression bandaging/velcro reduction  wraps  for 20-23 hours.  6 Pt/Caregiver will be independent in right and left  LE short stretch compression bandaging/velcro reduction wraps   7 Reduce right and left  LE lymphedema volume by 10 cm to allow pt to wear shoes and socks more comfortably .  8 Reduce right and left  LE lymphedema density at the ankle  to soft and supple  to reduce infection risk.  9 Pt will be independent in use of compression garments, self-manual lymph drainage, decongestive exercises and lymphedema precautions for life-long self-management of lymphedema.                  Plan  Patient will be seen 1-2 x per weekx/week or a  total of 15 visits over a 90 day period. Treatment will include: MLD; Compression; Skin care; Manual therapy; Therapeutic exercises; Home exercise program instructions    Treatment Last 4 Visits       4/29/2025 5/13/2025 5/16/2025 5/23/2025   OT Treatment   Treatment Day  2 3 4   Therapeutic Exercise  Heel raises  Ankle pumps  Toe crunches      Manual Therapy  Deep diaphragmatic breathing   Measurements for Farrow wraps   Reviewed decongestive exercises  Discussed the anatomy and functions of the lymphatic system during MLD  MLD to right and left lower quadrant   Applied tensogrip sleeves end of session Deep diaphragmatic breathing    MLD to bilateral Lower quadrant   Discussed the anatomy and functions of the lymphatic system during session Deep diaphragmatic breathing     MLD to bilateral Lower quadrant      Self Care Home Mgmt Time spent discussing the anatomy and functions of the Lymphatic system. Discussed different compression options during the reduction phase of treatment, such as bandaging, velcro reduction wraps and a trial of tensogrip. Tensogrip applied today with size D up to the knee and size C overlapping on the foot and ankle.  Pt. Instructed in decongestive exercises   Self massage and how to don the farrow wraps once she receives them  Instructed pt. How to don/doff the new farrow wraps and discussed wear and care. Pt. Will get measured for custom knee high garments when appropriate   Manual Therapy Min  55 30 30   Self Care Home Mgmt Min 20  30 12   Total Timed Procedures 20 55 60 42   Total Service Procedures 20 55 60 42   Total Time 20 55 60 42         HEP  Ankle pumps  Heel raises   Toe crunches     Charges     self care x 1, manual therpay x 2    42

## 2025-05-28 ENCOUNTER — OFFICE VISIT (OUTPATIENT)
Dept: OCCUPATIONAL MEDICINE | Facility: HOSPITAL | Age: 45
End: 2025-05-28
Attending: FAMILY MEDICINE
Payer: COMMERCIAL

## 2025-05-28 PROCEDURE — 97140 MANUAL THERAPY 1/> REGIONS: CPT

## 2025-05-28 NOTE — PROGRESS NOTES
Patient: Daniela Buenrostro (44 year old, female) Referring Provider:  Insurance:   Diagnosis: Associated DX:  Bilateral leg edema (R60.0)  S/P bilateral mastectomy (Z90.13)  Lymphatic edema (I89.0) No ref. provider found  [employer] POS   Date of Surgery: April 2023 mastectomy and reconstruction in 2023. fat grafting was in august in 2023 Next MD visit:  N/A   Precautions:    No data recorded Referral Information:   Date of Injury: No data recorded Date of Evaluation: Req: 0, Auth: 0, Exp:     04/29/25 POC Auth Visits:  15      Today's Date   5/28/2025    Subjective  Patient reports that it is very hard to wear the foot wraps at night. She also reports that her legs are less achy from wearing the wraps. She notes that she tried one night just sleeping with the leg wraps and not the foot pieces, and her feet blew up       Pain: 0/10     Objective  Patient attends therapy today with her new farrow wraps       Assessment  Pt. presents with good tolerance to wearing te wraps during the day, but not at night. She may benefit from loosening the wraps at night for improved comfort    Goals (to be met in 15 visits))   1 Pt will be independent in daily skin care.  2 Pt will tolerate right and left  modified compression via Tensogrip sleeve for 6-8 hours.  3 Pt will be independent in decongestive exercises.  4 Pt will be independent in self-manual lymph drainage.  5 Pt will tolerate right and left  LE short stretch compression bandaging/velcro reduction  wraps  for 20-23 hours.  6 Pt/Caregiver will be independent in right and left  LE short stretch compression bandaging/velcro reduction wraps   7 Reduce right and left  LE lymphedema volume by 10 cm to allow pt to wear shoes and socks more comfortably .  8 Reduce right and left  LE lymphedema density at the ankle  to soft and supple  to reduce infection risk.  9 Pt will be independent in use of compression garments, self-manual lymph drainage, decongestive exercises and  lymphedema precautions for life-long self-management of lymphedema.                      Plan  Patient will be seen 1-2 x per weekx/week or a total of 15 visits over a 90 day period. Treatment will include: MLD; Compression; Skin care; Manual therapy; Therapeutic exercises; Home exercise program instructions    Treatment Last 4 Visits       5/13/2025 5/16/2025 5/23/2025 5/28/2025   OT Treatment   Treatment Day 2 3 4 5   Therapeutic Exercise Heel raises  Ankle pumps  Toe crunches       Manual Therapy Deep diaphragmatic breathing   Measurements for Farrow wraps   Reviewed decongestive exercises  Discussed the anatomy and functions of the lymphatic system during MLD  MLD to right and left lower quadrant   Applied tensogrip sleeves end of session Deep diaphragmatic breathing    MLD to bilateral Lower quadrant   Discussed the anatomy and functions of the lymphatic system during session Deep diaphragmatic breathing     MLD to bilateral Lower quadrant    Deep diaphragmatic breathing      MLD to bilateral Lower quadrant     Discussed loosening the leg and foot wraps at night for increased comfort     Applied a rosidal foam insert around the ankle for increased comfort against the achilles tendon  Pt. Not interested in the home pneumatic pump    Self Care Home Mgmt  Pt. Instructed in decongestive exercises   Self massage and how to don the farrow wraps once she receives them  Instructed pt. How to don/doff the new farrow wraps and discussed wear and care. Pt. Will get measured for custom knee high garments when appropriate    Manual Therapy Min 55 30 30 45   Self Care Home Mgmt Min  30 12    Total Timed Procedures 55 60 42 45   Total Service Procedures 55 60 42 45   Total Time 55 60 42 45         HEP  See flow sheet    Charges     manual therapy x 3    45

## 2025-05-30 ENCOUNTER — OFFICE VISIT (OUTPATIENT)
Dept: OCCUPATIONAL MEDICINE | Facility: HOSPITAL | Age: 45
End: 2025-05-30
Attending: FAMILY MEDICINE
Payer: COMMERCIAL

## 2025-05-30 PROCEDURE — 97140 MANUAL THERAPY 1/> REGIONS: CPT

## 2025-05-30 NOTE — PROGRESS NOTES
Patient: Daniela Buenrostro (44 year old, female) Referring Provider:  Insurance:   Diagnosis: Associated DX:  Bilateral leg edema (R60.0)  S/P bilateral mastectomy (Z90.13)  Lymphatic edema (I89.0) No ref. provider found  BCBS POS   Date of Surgery: April 2023 mastectomy and reconstruction in 2023. fat grafting was in august in 2023 Next MD visit:  N/A   Precautions:    No data recorded Referral Information:   Date of Injury: No data recorded Date of Evaluation: Req: 0, Auth: 0, Exp:     04/29/25 POC Auth Visits:  15      Today's Date   5/30/2025    Subjective          Pain:  0/10     Objective   Attends therapy today with her farrow wraps on bilateral legs        Assessment     Patient will benefit from custom closed toe knee highs when she is ready to transition to the maintenance phase of treatment     Goals (to be met in 15 visits))   1 Pt will be independent in daily skin care.  2 Pt will tolerate right and left  modified compression via Tensogrip sleeve for 6-8 hours.  3 Pt will be independent in decongestive exercises.  4 Pt will be independent in self-manual lymph drainage.  5 Pt will tolerate right and left  LE short stretch compression bandaging/velcro reduction  wraps  for 20-23 hours.  6 Pt/Caregiver will be independent in right and left  LE short stretch compression bandaging/velcro reduction wraps   7 Reduce right and left  LE lymphedema volume by 10 cm to allow pt to wear shoes and socks more comfortably .  8 Reduce right and left  LE lymphedema density at the ankle  to soft and supple  to reduce infection risk.  9 Pt will be independent in use of compression garments, self-manual lymph drainage, decongestive exercises and lymphedema precautions for life-long self-management of lymphedema.                          Plan   1 Pt will be independent in daily skin care.  2 Pt will tolerate right and left  modified compression via Tensogrip sleeve for 6-8 hours.  3 Pt will be independent in decongestive  exercises.  4 Pt will be independent in self-manual lymph drainage.  5 Pt will tolerate right and left  LE short stretch compression bandaging/velcro reduction  wraps  for 20-23 hours.  6 Pt/Caregiver will be independent in right and left  LE short stretch compression bandaging/velcro reduction wraps   7 Reduce right and left  LE lymphedema volume by 10 cm to allow pt to wear shoes and socks more comfortably .  8 Reduce right and left  LE lymphedema density at the ankle  to soft and supple  to reduce infection risk.  9 Pt will be independent in use of compression garments, self-manual lymph drainage, decongestive exercises and lymphedema precautions for life-long self-management of lymphedema.      Frequency / Duration: Patient will be seen 1-2 x per weekx/week or a total of 15 visits over a 90 day period. Treatment will include: MLD; Compression; Skin care; Manual therapy; Therapeutic exercises; Home exercise program instructions    Treatment Last 4 Visits       5/16/2025 5/23/2025 5/28/2025 5/30/2025   OT Treatment   Treatment Day 3 4 5 6   Manual Therapy Deep diaphragmatic breathing    MLD to bilateral Lower quadrant   Discussed the anatomy and functions of the lymphatic system during session Deep diaphragmatic breathing     MLD to bilateral Lower quadrant    Deep diaphragmatic breathing      MLD to bilateral Lower quadrant     Discussed loosening the leg and foot wraps at night for increased comfort     Applied a rosidal foam insert around the ankle for increased comfort against the achilles tendon  Pt. Not interested in the home pneumatic pump  Deep diaphragmatic breathing      MLD to bilateral Lower quadrant     Applied size D tubigrip bilateral legs with rosidal soft around the ankles and farrow leg and foot wraps .    Self Care Home Mgmt Pt. Instructed in decongestive exercises   Self massage and how to don the farrow wraps once she receives them  Instructed pt. How to don/doff the new farrow wraps and  discussed wear and care. Pt. Will get measured for custom knee high garments when appropriate     Manual Therapy Min 30 30 45 45   Self Care Home Mgmt Min 30 12     Total Timed Procedures 60 42 45 45   Total Service Procedures 60 42 45 45   Total Time 60 42 45 45         HEP  Ankle pumps  Heel raises     Charges     manual therapy x 3    45

## 2025-06-05 ENCOUNTER — TELEPHONE (OUTPATIENT)
Dept: PHYSICAL THERAPY | Facility: HOSPITAL | Age: 45
End: 2025-06-05

## 2025-06-06 ENCOUNTER — APPOINTMENT (OUTPATIENT)
Dept: OCCUPATIONAL MEDICINE | Facility: HOSPITAL | Age: 45
End: 2025-06-06
Attending: FAMILY MEDICINE
Payer: COMMERCIAL

## 2025-06-12 ENCOUNTER — OFFICE VISIT (OUTPATIENT)
Dept: OCCUPATIONAL MEDICINE | Facility: HOSPITAL | Age: 45
End: 2025-06-12
Attending: FAMILY MEDICINE
Payer: COMMERCIAL

## 2025-06-12 PROCEDURE — 97140 MANUAL THERAPY 1/> REGIONS: CPT

## 2025-06-12 PROCEDURE — 97535 SELF CARE MNGMENT TRAINING: CPT

## 2025-06-12 PROCEDURE — 97110 THERAPEUTIC EXERCISES: CPT

## 2025-06-12 NOTE — PROGRESS NOTES
Patient: Daniela Buenrostro (44 year old, female) Referring Provider:  Insurance:   Diagnosis: Associated DX:  Bilateral leg edema (R60.0)  S/P bilateral mastectomy (Z90.13)  Lymphatic edema (I89.0) No ref. provider found  BCBS POS   Date of Surgery: April 2023 mastectomy and reconstruction in 2023. fat grafting was in august in 2023 Next MD visit:  N/A   Precautions:    No data recorded Referral Information:   Date of Injury: No data recorded Date of Evaluation: Req: 0, Auth: 0, Exp:     04/29/25 POC Auth Visits:  15         Progress Summary  Pt has attended 6 visits in Occupational Therapy.    Today's Date   6/12/2025    Subjective  Patient reports that she is getting leg cramps when the leg wraps on on       Pain: 0/10     Objective  Patient attends therapy today with her  farrow wraps and foot pieces on both legs       Assessment  Objective measurements taken today reflect a slightly higher volume in the distal lower legs and ankles. She would benefit from adjusting the straps so that the foot piece and distal end of the farrow leg piece are tighter. She is demonstrating a good understanding of self massage and the decongestive exercises.    Goals (to be met in 15 visits))   1 Pt will be independent in daily skin care.  2 Pt will tolerate right and left  modified compression via Tensogrip sleeve for 6-8 hours.  3 Pt will be independent in decongestive exercises.  4 Pt will be independent in self-manual lymph drainage.  5 Pt will tolerate right and left  LE short stretch compression bandaging/velcro reduction  wraps  for 20-23 hours.  6 Pt/Caregiver will be independent in right and left  LE short stretch compression bandaging/velcro reduction wraps   7 Reduce right and left  LE lymphedema volume by 10 cm to allow pt to wear shoes and socks more comfortably .  8 Reduce right and left  LE lymphedema density at the ankle  to soft and supple  to reduce infection risk.  9 Pt will be independent in use of compression  garments, self-manual lymph drainage, decongestive exercises and lymphedema precautions for life-long self-management of lymphedema.                         Lymph Calculations       4/29/2025 6/12/2025   OT Lymphedema Calculations   10cm above SBP 45 cm 44.2 cm   SBP 39.8 cm 38.1 cm   5cm BELOW SBP 39.2 cm 37.5 cm   10cm below SBP 33.5 cm 33.2 cm   20cm below SBP 36.2 cm 34.4 cm   30cm below SBP 29 cm 28 cm   35cm below SBP 23.8 cm 23.1 cm   40cm below SBP 22.4 cm 21.5 cm   Lateral Malleoli 25.6 cm 25.5 cm   7cm prox to 1st toe web space 22 cm 21.8 cm   5cm prox to 1st toe web space 20.9 cm 20.8 cm   RLE Total 337.4 cm 328.1 cm   10cm above SBP 46.5 cm 48.5 cm   SBP 41.5 cm 39.8 cm   5cm below SBP 37.5 cm 37.5 cm   10cm below SBP 34.5 cm 33.5 cm   20cm below SBP 36.5 cm 35.6 cm   30cm below SBP 31 cm 28 cm   35cm below SBP 24.1 cm 24 cm   40cm below SBP 22.8 cm 22.8 cm   Lateral Malleoli 25.1 cm 25.8 cm   7cm prox to 1st toe web space 23 cm 22.6 cm   5cm prox to 1st toe web space 21.1 cm 22.2 cm   LLE Total 343.6 cm 340.3 cm          Plan: Continue skilled Occupational Therapy 1-2 x/week or a total of 15 visits over a 90 day period 9 remaining Treatment will include: CDT       Patient/Family/Caregiver was advised of these findings, precautions, and treatment options and has agreed to actively participate in planning and for this course of care.    Thank you for your referral. If you have any questions, please contact me at Dept: [phone].    Sincerely,  Electronically signed by therapist: Meenakshi Ng, OT     Physician's certification required:  No  Please co-sign or sign and return this letter via fax as soon as possible to [phone].   I certify the need for these services furnished under this plan of treatment and while under my care.    X___________________________________________________ Date____________________    Treatment Last 4 Visits       5/23/2025 5/28/2025 5/30/2025 6/12/2025   OT Treatment    Treatment Day 4 5 6 6   Therapeutic Exercise    Measurements/pn   Manual Therapy Deep diaphragmatic breathing     MLD to bilateral Lower quadrant    Deep diaphragmatic breathing      MLD to bilateral Lower quadrant     Discussed loosening the leg and foot wraps at night for increased comfort     Applied a rosidal foam insert around the ankle for increased comfort against the achilles tendon  Pt. Not interested in the home pneumatic pump  Deep diaphragmatic breathing      MLD to bilateral Lower quadrant     Applied size D tubigrip bilateral legs with rosidal soft around the ankles and farrow leg and foot wraps .  Deep diaphragmatic breathing      MLD to bilateral Lower quadrant     Applied Farrow foot pieces and leg pieces at the end of session. The distal end of the leg wrap and the foot wrap were wrapped tighter   Self Care Home Mgmt Instructed pt. How to don/doff the new farrow wraps and discussed wear and care. Pt. Will get measured for custom knee high garments when appropriate   Time spent reviewing decongestive exercises and self massage    Therapeutic Exercise Min    10   Manual Therapy Min 30 45 45 30   Self Care Home Mgmt Min 12   15   Total Timed Procedures 42 45 45 55   Total Service Procedures 42 45 45 55   Total Time 42 45 45 55         HEP   Deep diaphragmatic breathing   Self massage    Charges     TE x 1. manual therapy x 2, self care x 1

## 2025-06-17 ENCOUNTER — OFFICE VISIT (OUTPATIENT)
Dept: OCCUPATIONAL MEDICINE | Facility: HOSPITAL | Age: 45
End: 2025-06-17
Attending: FAMILY MEDICINE
Payer: COMMERCIAL

## 2025-06-17 PROCEDURE — 97140 MANUAL THERAPY 1/> REGIONS: CPT

## 2025-06-18 NOTE — PROGRESS NOTES
Patient: Daniela Buenrostro (44 year old, female) Referring Provider:  Insurance:   Diagnosis: Associated DX:  Bilateral leg edema (R60.0)  S/P bilateral mastectomy (Z90.13)  Lymphatic edema (I89.0) No ref. provider found  BCBS POS   Date of Surgery: April 2023 mastectomy and reconstruction in 2023. fat grafting was in august in 2023 Next MD visit:  N/A   Precautions:    No data recorded Referral Information:   Date of Injury: No data recorded Date of Evaluation: Req: 0, Auth: 0, Exp:     04/29/25 POC Auth Visits:  15      Today's Date   6/17/2025    Subjective  Patient reports pain only at night when she sleeps       Pain:  0/10     Objective  Patient attends therapy today with her  farrow wraps and foot pieces on both legs       Assessment  Objective measurements taken of bilateral ankles reflect a nice reduction. Pt. is demonstrating appropriates technique for donning the wraps for more effective compression at the ankles    Goals (to be met in 15 visits))   1 Pt will be independent in daily skin care.  2 Pt will tolerate right and left  modified compression via Tensogrip sleeve for 6-8 hours.  3 Pt will be independent in decongestive exercises.  4 Pt will be independent in self-manual lymph drainage.  5 Pt will tolerate right and left  LE short stretch compression bandaging/velcro reduction  wraps  for 20-23 hours.  6 Pt/Caregiver will be independent in right and left  LE short stretch compression bandaging/velcro reduction wraps   7 Reduce right and left  LE lymphedema volume by 10 cm to allow pt to wear shoes and socks more comfortably .  8 Reduce right and left  LE lymphedema density at the ankle  to soft and supple  to reduce infection risk.  9 Pt will be independent in use of compression garments, self-manual lymph drainage, decongestive exercises and lymphedema precautions for life-long self-management of lymphedema.          Frequency / Duration: Patient will be seen 1-2 x per weekx/week or a total of  15 visits over a 90 day period. Treatment will include: MLD; Compression; Skin care; Manual therapy; Therapeutic exercises; Home exercise program instructions    Treatment Last 4 Visits       5/28/2025 5/30/2025 6/12/2025 6/17/2025   OT Treatment   Treatment Day 5 6 6 7   Therapeutic Exercise   Measurements/pn    Manual Therapy Deep diaphragmatic breathing      MLD to bilateral Lower quadrant     Discussed loosening the leg and foot wraps at night for increased comfort     Applied a rosidal foam insert around the ankle for increased comfort against the achilles tendon  Pt. Not interested in the home pneumatic pump  Deep diaphragmatic breathing      MLD to bilateral Lower quadrant     Applied size D tubigrip bilateral legs with rosidal soft around the ankles and farrow leg and foot wraps .  Deep diaphragmatic breathing      MLD to bilateral Lower quadrant     Applied Farrow foot pieces and leg pieces at the end of session. The distal end of the leg wrap and the foot wrap were wrapped tighter Ankle measurement   Right ankle  24.1 cm  Left ankle 23.9 cm    Wraps removed  Deep diaphragmatic breathing      MLD to bilateral Lower quadrant     Reviewed proper technique for don/doffing the wraps.    Discussed getting measured within the next 1-2 weeks for open toe class 2 knee or thigh high garments   Self Care Home Mgmt   Time spent reviewing decongestive exercises and self massage     Therapeutic Exercise Min   10    Manual Therapy Min 45 45 30 45   Self Care Home Mgmt Min   15    Total Timed Procedures 45 45 55 45   Total Service Procedures 45 45 55 45   Total Time 45 45 55 45         HEP  Self massage  Ankle pumps and heel raises     Charges     manual therapy x 3

## 2025-06-19 ENCOUNTER — OFFICE VISIT (OUTPATIENT)
Dept: OCCUPATIONAL MEDICINE | Facility: HOSPITAL | Age: 45
End: 2025-06-19
Attending: FAMILY MEDICINE
Payer: COMMERCIAL

## 2025-06-19 ENCOUNTER — OFFICE VISIT (OUTPATIENT)
Age: 45
End: 2025-06-19
Attending: INTERNAL MEDICINE
Payer: COMMERCIAL

## 2025-06-19 VITALS
WEIGHT: 137.5 LBS | HEIGHT: 65 IN | TEMPERATURE: 96 F | DIASTOLIC BLOOD PRESSURE: 78 MMHG | BODY MASS INDEX: 22.91 KG/M2 | RESPIRATION RATE: 18 BRPM | HEART RATE: 86 BPM | OXYGEN SATURATION: 100 % | SYSTOLIC BLOOD PRESSURE: 138 MMHG

## 2025-06-19 DIAGNOSIS — C50.411 MALIGNANT NEOPLASM OF UPPER-OUTER QUADRANT OF RIGHT BREAST IN FEMALE, ESTROGEN RECEPTOR POSITIVE (HCC): Primary | ICD-10-CM

## 2025-06-19 DIAGNOSIS — Z17.0 MALIGNANT NEOPLASM OF UPPER-OUTER QUADRANT OF RIGHT BREAST IN FEMALE, ESTROGEN RECEPTOR POSITIVE (HCC): Primary | ICD-10-CM

## 2025-06-19 PROCEDURE — 97140 MANUAL THERAPY 1/> REGIONS: CPT

## 2025-06-19 NOTE — PROGRESS NOTES
Cancer Center Progress Note    Problem List:      Problem List[1]      History of Present Illness  Daniela Buenrostro is a 44 year old female with right breast cancer who presents for continued follow-up.    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 She is currently on tamoxifen 20 mg daily. She stopped tamoxifen for a period due to side effects including trouble sleeping, hot flashes, and swelling. The hot flashes have since resolved, and she resumed tamoxifen around the end of January or February. Sleep has improved, although she occasionally wakes up but can fall back asleep easily. Her  has noticed increased moodiness since resuming tamoxifen, which she attributes to having a 'shorter fuse', especially at night. Her menstrual periods have been heavy with short cycles and longer intervals while on tamoxifen.    She has been experiencing bilateral lower extremity edema since fall of last year, with swelling noted in her ankles and pelvis. She has been attending occupational therapy at a lymphedema clinic since May, and reports improvement in measurements. She currently uses wraps on her legs and is scheduled to be measured for custom compression stockings. A pelvic ultrasound in April 2023 was normal, and she has not had ultrasounds of her legs.    Her family history is significant for her father having had two blood clots in his leg, although he has not been officially tested for Factor V Leiden mutation. She has not been tested for this mutation either. A panel of blood work in March showed normal CBC and chemistry results.    She had a previous ultrasound in March 2024 that showed a likely benign lymph node, and she anticipates a follow-up ultrasound of the right breast around September.      Review of Systems:   Constitutional: Negative for anorexia, fatigue, fevers, chills, night sweats and weight loss.  Eyes: Negative for  visual disturbance, irritation and redness.  Respiratory: Negative for cough, hemoptysis, chest pain, or dyspnea.  Cardiovascular: Negative for angina, orthopnea or palpitations.  Gastrointestinal: Negative for nausea, vomiting, change in bowel habits, diarrhea, constipation and abdominal pain.  Integument/breast: Negative for rash, skin lesions, and pruritus.  Hematologic/lymphatic: Negative for easy bruising, bleeding, and lymphadenopathy.  Musculoskeletal: Negative for myalgias, arthralgias, muscle weakness.  Genitourinary: Negative for dysuria or hematuria  Psychiatric: The patient's mood was calm and appropriate for this visit.  The pertinent positives and negatives were described. All other systems were negative.    PMH/PSH:  Past Medical History[2]    Past Surgical History[3]    Family History Reviewed:  Family History[4]    Allergies:     Allergies[5]    Medications:  Current Medications[6]       Vital Signs:      Height: 165.1 cm (5' 5\") (06/19 1117)  Weight: 62.4 kg (137 lb 8 oz) (06/19 1117)  BSA (Calculated - sq m): 1.69 sq meters (06/19 1117)  Pulse: 86 (06/19 1117)  BP: 138/78 (06/19 1117)  Temp: 96 °F (35.6 °C) (06/19 1117)  Do Not Use - Resp Rate: --  SpO2: 100 % (06/19 1117)      Performance Status:  ECOG 0: Fully active, able to carry on all pre-disease performance without restriction     Physical Examination:      Constitutional: Patient is alert and oriented x 3, not in acute distress.  Eyes: Anicteric sclera, pink conjunctiva.  HEENT:  Oropharynx is clear. Neck is supple.  Respiratory: Clear to auscultation and percussion. No rales.  No wheezes.  Cardiovascular: Regular rate and rhythm. No murmurs.  Gastrointestinal: Soft, non tender with good bowel sounds.  Musculoskeletal: No edema. No calf tenderness.  Skin: No suspicious skin lesion, no rash, no ulceration.  Lymphatics: There is no palpable lymphadenopathy throughout in the cervical, supraclavicular, or axillary regions.  Psychiatric: The  patient's mood is calm and appropriate for this visit.         Results reviewed at this visit:     Lab Results   Component Value Date    WBC 6.3 03/29/2025    RBC 4.25 03/29/2025    HGB 12.9 03/29/2025    HCT 39.4 03/29/2025    MCV 92.7 03/29/2025    MCH 30.4 03/29/2025    MCHC 32.7 03/29/2025    RDW 13.8 03/29/2025    .0 03/29/2025     Lab Results   Component Value Date     03/29/2025    K 4.2 03/29/2025     03/29/2025    CO2 27.0 03/29/2025    BUN 14 03/29/2025    CREATSERUM 0.77 03/29/2025    GLU 89 03/29/2025    CA 9.5 03/29/2025    ALKPHO 40 03/29/2025    ALT 44 03/29/2025    AST 25 03/29/2025    BILT 0.7 03/29/2025    ALB 4.2 03/29/2025    TP 6.7 03/29/2025       Results  LABS  CBC: Within normal limits (03/2025)  Chemistry panel: Within normal limits (03/2025)    RADIOLOGY  Pelvic ultrasound: Right ovary normal. Left ovary normal. Unremarkable. (04/23/2025)  Right breast ultrasound: 0.6 x 0.4 x 0.4 cm lymph node, probably benign. Six-month follow-up recommended. 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 Currently on tamoxifen 20 mg daily, experiencing mood changes and sleep disturbances, but no hot flashes. Tamoxifen is preferred for its efficacy and lower side effect profile compared to alternatives that induce menopause, which often have more severe symptoms. The cure rate is equally high with tamoxifen without inducing menopause.  - Continue tamoxifen 20 mg daily  - Schedule follow-up ultrasound of the right breast in September    Lower extremity edema  Bilateral lower extremity edema since fall of last year, improved with occupational therapy. No pelvic abnormalities on  ultrasound. No vascular consultation yet. Tamoxifen increases risk of blood clots, but current symptoms do not suggest a clot. Consider vascular evaluation if symptoms persist or worsen.  - Continue occupational therapy  - Measure for custom compression stockings  - Consider bilateral leg ultrasound if edema persists or worsens  - Consider referral to vascular surgeon if ultrasound is inconclusive or symptoms persist           The following individual(s) verbally consented to be recorded using ambient AI listening technology and understand that they can each withdraw their consent to this listening technology at any point by asking the clinician to turn off or pause the recording:    Patient name: Daniela LOVELL Preciousclarita        Wilner Sanderson MD          [1]   Patient Active Problem List  Diagnosis    Atypical ductal hyperplasia of breast    S/P bilateral mastectomy    Malignant neoplasm of upper-outer quadrant of right breast in female, estrogen receptor positive (HCC)    Special screening for malignant neoplasms, colon    Benign neoplasm of cecum    Benign neoplasm of sigmoid colon    Mass of right breast    History of right breast cancer   [2]   Past Medical History:   Anesthesia complication    Atypical ductal hyperplasia of right breast    Body piercing    ears pierced    Cancer (HCC)    Breast cancer    COVID-19    asymptomatic    Enlarged lymph node    right breast    Hemorrhoids    only when pregnant    History of COVID-19    sinus congestion and heacache. not hospitalized, no continued symptoms    PONV (postoperative nausea and vomiting)   [3]   Past Surgical History:  Procedure Laterality Date    Breast biopsy  09/30/2022    Breast reconstruc w tiss expandr  4/19/23    Breast reconstruction  8/23/23    Colonoscopy  June 20th, 2024    Renaldo biopsy stereo nodule 1 site right (cpt=19081)  October 2022, November 2022, March 2024    Renaldo biopsy stereotactic guided      Mastectomy left  4/19/23    Mastectomy right   23      02/15/2017      2018    Oral surgery  1997    wisdom teeth/general anesthesia    Other surgical history  23    Bilateral Mastectomy  Breast Reconstruction   [4]   Family History  Problem Relation Age of Onset    Bleeding Disorders Father         Blood Clots    Colon Polyps Father     Other (factor v) Father     Other (Other) Mother         Hypothyroid    Heart Disorder Maternal Grandfather     Heart Attack Maternal Grandfather     Prostate Cancer Maternal Grandfather     Cancer Maternal Grandfather     Heart Disease Maternal Grandfather     No Known Problems Paternal Grandmother     Ulcerative Colitis Paternal Grandfather    [5] No Known Allergies  [6]    clonazePAM 0.5 MG Oral Tab Take 1 tablet (0.5 mg total) by mouth nightly as needed (insomnia). 30 tablet 2    tamoxifen 20 MG Oral Tab Take 1 tablet (20 mg total) by mouth daily. 90 tablet 3

## 2025-06-19 NOTE — PROGRESS NOTES
Patient here for 6 month f/u for breast cancer. Patient taking tamoxifen as directed with c/o mood disturbances. Patient states that the hot flashes have resolved. Patient recently has been in PT for bilateral lower extremity lymphedema. Patient c/o mild joint pain her her shoulders, elbows and legs. Patient states the joint pain comes and goes and she is managing it at home. Patient completed a breast exam with her OB/gyne about 6 weeks ago.     Education Record    Learner:  Patient    Disease / Diagnosis: breast cancer     Barriers / Limitations:  None   Comments:    Method:  Discussion   Comments:    General Topics:  Medication, Side effects and symptom management, and Plan of care reviewed   Comments:    Outcome:  Shows understanding   Comments:

## 2025-06-19 NOTE — PROGRESS NOTES
Patient: Daniela Buenrostro (44 year old, female) Referring Provider:  Insurance:   Diagnosis: Associated DX:  Bilateral leg edema (R60.0)  S/P bilateral mastectomy (Z90.13)  Lymphatic edema (I89.0) No ref. provider found  BCBS POS   Date of Surgery: April 2023 mastectomy and reconstruction in 2023. fat grafting was in august in 2023 Next MD visit:  N/A   Precautions:    No data recorded Referral Information:   Date of Injury: No data recorded Date of Evaluation: Req: 0, Auth: 0, Exp:     04/29/25 POC Auth Visits:  15      Today's Date   6/19/2025    Subjective  Patient reports less pain at night       Pain:  0/10     Objective  Patient attends therapy today with her  farrow wraps and foot pieces on both legs       Assessment  Pt. is ready to get measured for custom compression garments    Goals (to be met in 15 visits))   1 Pt will be independent in daily skin care.  2 Pt will tolerate right and left  modified compression via Tensogrip sleeve for 6-8 hours.  3 Pt will be independent in decongestive exercises.  4 Pt will be independent in self-manual lymph drainage.  5 Pt will tolerate right and left  LE short stretch compression bandaging/velcro reduction  wraps  for 20-23 hours.  6 Pt/Caregiver will be independent in right and left  LE short stretch compression bandaging/velcro reduction wraps   7 Reduce right and left  LE lymphedema volume by 10 cm to allow pt to wear shoes and socks more comfortably .  8 Reduce right and left  LE lymphedema density at the ankle  to soft and supple  to reduce infection risk.  9 Pt will be independent in use of compression garments, self-manual lymph drainage, decongestive exercises and lymphedema precautions for life-long self-management of lymphedema.                         Lymph Calculations       4/29/2025 6/12/2025   OT Lymphedema Calculations   10cm above SBP 45 cm 44.2 cm   SBP 39.8 cm 38.1 cm   5cm BELOW SBP 39.2 cm 37.5 cm   10cm below SBP 33.5 cm 33.2 cm   20cm below  SBP 36.2 cm 34.4 cm   30cm below SBP 29 cm 28 cm   35cm below SBP 23.8 cm 23.1 cm   40cm below SBP 22.4 cm 21.5 cm   Lateral Malleoli 25.6 cm 25.5 cm   7cm prox to 1st toe web space 22 cm 21.8 cm   5cm prox to 1st toe web space 20.9 cm 20.8 cm   RLE Total 337.4 cm 328.1 cm   10cm above SBP 46.5 cm 48.5 cm   SBP 41.5 cm 39.8 cm   5cm below SBP 37.5 cm 37.5 cm   10cm below SBP 34.5 cm 33.5 cm   20cm below SBP 36.5 cm 35.6 cm   30cm below SBP 31 cm 28 cm   35cm below SBP 24.1 cm 24 cm   40cm below SBP 22.8 cm 22.8 cm   Lateral Malleoli 25.1 cm 25.8 cm   7cm prox to 1st toe web space 23 cm 22.6 cm   5cm prox to 1st toe web space 21.1 cm 22.2 cm   LLE Total 343.6 cm 340.3 cm          Plan: Continue skilled Occupational Therapy 1-2 x/week or a total of 15 visits over a 90 day period 9 remaining Treatment will include: CDT       Patient/Family/Caregiver was advised of these findings, precautions, and treatment options and has agreed to actively participate in planning and for this course of care.    Thank you for your referral. If you have any questions, please contact me at Dept: [phone].    Sincerely,  Electronically signed by therapist: Meenakshi Ng, OT     Physician's certification required:  No  Please co-sign or sign and return this letter via fax as soon as possible to [phone].   I certify the need for these services furnished under this plan of treatment and while under my care.    X___________________________________________________ Date____________________    Treatment Last 4 Visits       5/23/2025 5/28/2025 5/30/2025 6/12/2025   OT Treatment   Treatment Day 4 5 6 6   Therapeutic Exercise    Measurements/pn   Manual Therapy Deep diaphragmatic breathing     MLD to bilateral Lower quadrant    Deep diaphragmatic breathing      MLD to bilateral Lower quadrant     Discussed loosening the leg and foot wraps at night for increased comfort     Applied a rosidal foam insert around the ankle for increased  comfort against the achilles tendon  Pt. Not interested in the home pneumatic pump  Deep diaphragmatic breathing      MLD to bilateral Lower quadrant     Applied size D tubigrip bilateral legs with rosidal soft around the ankles and farrow leg and foot wraps .  Deep diaphragmatic breathing      MLD to bilateral Lower quadrant     Applied Farrow foot pieces and leg pieces at the end of session. The distal end of the leg wrap and the foot wrap were wrapped tighter   Self Care Home Mgmt Instructed pt. How to don/doff the new farrow wraps and discussed wear and care. Pt. Will get measured for custom knee high garments when appropriate   Time spent reviewing decongestive exercises and self massage    Therapeutic Exercise Min    10   Manual Therapy Min 30 45 45 30   Self Care Home Mgmt Min 12   15   Total Timed Procedures 42 45 45 55   Total Service Procedures 42 45 45 55   Total Time 42 45 45 55         HEP   Deep diaphragmatic breathing   Self massage    Charges     TE x 1. manual therapy x 2, self care x 1         Plan  Patient will be seen 1-2 x per weekx/week or a total of 15 visits over a 90 day period. Treatment will include: MLD; Compression; Skin care; Manual therapy; Therapeutic exercises; Home exercise program instructions    Treatment Last 4 Visits       5/30/2025 6/12/2025 6/17/2025 6/19/2025   OT Treatment   Treatment Day 6 6 7 8   Therapeutic Exercise  Measurements/pn     Manual Therapy Deep diaphragmatic breathing      MLD to bilateral Lower quadrant     Applied size D tubigrip bilateral legs with rosidal soft around the ankles and farrow leg and foot wraps .  Deep diaphragmatic breathing      MLD to bilateral Lower quadrant     Applied Farrow foot pieces and leg pieces at the end of session. The distal end of the leg wrap and the foot wrap were wrapped tighter Ankle measurement   Right ankle  24.1 cm  Left ankle 23.9 cm    Wraps removed  Deep diaphragmatic breathing      MLD to bilateral Lower quadrant      Reviewed proper technique for don/doffing the wraps.    Discussed getting measured within the next 1-2 weeks for open toe class 2 knee or thigh high garments Deep diaphragmatic breathing      MLD to bilateral Lower quadrant     Applied kinesiotape at the dorsum of the ankles and feet to improve lymphatic flow     Applied size D tubigrip bilateral legs with rosidal soft around the ankles and farrow leg and foot wraps   Discussed safely/lymphedema precautions during travel   Self Care Home Mgmt  Time spent reviewing decongestive exercises and self massage      Therapeutic Exercise Min  10     Manual Therapy Min 45 30 45 55   Self Care Home Mgmt Min  15     Total Timed Procedures 45 55 45 55   Total Service Procedures 45 55 45 55   Total Time 45 55 45 55         HEP       Charges     manual therapy x 4

## 2025-07-01 ENCOUNTER — MED REC SCAN ONLY (OUTPATIENT)
Dept: FAMILY MEDICINE CLINIC | Facility: CLINIC | Age: 45
End: 2025-07-01

## 2025-07-02 ENCOUNTER — OFFICE VISIT (OUTPATIENT)
Dept: OCCUPATIONAL MEDICINE | Facility: HOSPITAL | Age: 45
End: 2025-07-02
Attending: FAMILY MEDICINE
Payer: COMMERCIAL

## 2025-07-02 PROCEDURE — 97140 MANUAL THERAPY 1/> REGIONS: CPT

## 2025-07-02 PROCEDURE — 97535 SELF CARE MNGMENT TRAINING: CPT

## 2025-07-03 NOTE — PROGRESS NOTES
Patient: Daniela Buenrostro (44 year old, female) Referring Provider:  Insurance:   Diagnosis: Associated DX:  Bilateral leg edema (R60.0)  S/P bilateral mastectomy (Z90.13)  Lymphatic edema (I89.0) No ref. provider found  BCBS POS   Date of Surgery: April 2023 mastectomy and reconstruction in 2023. fat grafting was in august in 2023 Next MD visit:  N/A   Precautions:    No data recorded Referral Information:   Date of Injury: No data recorded Date of Evaluation: Req: 0, Auth: 0, Exp:     04/29/25 POC Auth Visits:  15       Today's Date   7/2/2025    Subjective  Pt. reports that she got measured for her custom class 2 thigh highs open toe today. She also reports that she did get swelling in the groin area when she was in [place]. She also notes continued leg cramps       Pain:0  /10     Objective  Patient attends therapy today with her  farrow wraps and foot pieces on both legs             Assessment  Pt. is ready to progress to the maintanence phase of treatment.    Goals (to be met in 15 visits)   1 Pt will be independent in daily skin care.  2 Pt will tolerate right and left  modified compression via Tensogrip sleeve for 6-8 hours.  3 Pt will be independent in decongestive exercises.  4 Pt will be independent in self-manual lymph drainage.  5 Pt will tolerate right and left  LE short stretch compression bandaging/velcro reduction  wraps  for 20-23 hours.  6 Pt/Caregiver will be independent in right and left  LE short stretch compression bandaging/velcro reduction wraps   7 Reduce right and left  LE lymphedema volume by 10 cm to allow pt to wear shoes and socks more comfortably .  8 Reduce right and left  LE lymphedema density at the ankle  to soft and supple  to reduce infection risk.  9 Pt will be independent in use of compression garments, self-manual lymph drainage, decongestive exercises and lymphedema precautions for life-long self-management of lymphedema.                         Lymph Calculations        4/29/2025 6/12/2025   OT Lymphedema Calculations   10cm above SBP 45 cm 44.2 cm   SBP 39.8 cm 38.1 cm   5cm BELOW SBP 39.2 cm 37.5 cm   10cm below SBP 33.5 cm 33.2 cm   20cm below SBP 36.2 cm 34.4 cm   30cm below SBP 29 cm 28 cm   35cm below SBP 23.8 cm 23.1 cm   40cm below SBP 22.4 cm 21.5 cm   Lateral Malleoli 25.6 cm 25.5 cm   7cm prox to 1st toe web space 22 cm 21.8 cm   5cm prox to 1st toe web space 20.9 cm 20.8 cm   RLE Total 337.4 cm 328.1 cm   10cm above SBP 46.5 cm 48.5 cm   SBP 41.5 cm 39.8 cm   5cm below SBP 37.5 cm 37.5 cm   10cm below SBP 34.5 cm 33.5 cm   20cm below SBP 36.5 cm 35.6 cm   30cm below SBP 31 cm 28 cm   35cm below SBP 24.1 cm 24 cm   40cm below SBP 22.8 cm 22.8 cm   Lateral Malleoli 25.1 cm 25.8 cm   7cm prox to 1st toe web space 23 cm 22.6 cm   5cm prox to 1st toe web space 21.1 cm 22.2 cm   LLE Total 343.6 cm 340.3 cm          Plan: Continue skilled Occupational Therapy 1-2 x/week or a total of 15 visits over a 90 day period 9 remaining Treatment will include: CDT       Patient/Family/Caregiver was advised of these findings, precautions, and treatment options and has agreed to actively participate in planning and for this course of care.    Thank you for your referral. If you have any questions, please contact me at Dept: [phone].    Sincerely,  Electronically signed by therapist: Meenakshi Ng, OT     Physician's certification required:  No  Please co-sign or sign and return this letter via fax as soon as possible to [phone].   I certify the need for these services furnished under this plan of treatment and while under my care.    X___________________________________________________ Date____________________    Treatment Last 4 Visits       5/23/2025 5/28/2025 5/30/2025 6/12/2025   OT Treatment   Treatment Day 4 5 6 6   Therapeutic Exercise    Measurements/pn   Manual Therapy Deep diaphragmatic breathing     MLD to bilateral Lower quadrant    Deep diaphragmatic breathing       MLD to bilateral Lower quadrant     Discussed loosening the leg and foot wraps at night for increased comfort     Applied a rosidal foam insert around the ankle for increased comfort against the achilles tendon  Pt. Not interested in the home pneumatic pump  Deep diaphragmatic breathing      MLD to bilateral Lower quadrant     Applied size D tubigrip bilateral legs with rosidal soft around the ankles and farrow leg and foot wraps .  Deep diaphragmatic breathing      MLD to bilateral Lower quadrant     Applied Farrow foot pieces and leg pieces at the end of session. The distal end of the leg wrap and the foot wrap were wrapped tighter   Self Care Home Mgmt Instructed pt. How to don/doff the new farrow wraps and discussed wear and care. Pt. Will get measured for custom knee high garments when appropriate   Time spent reviewing decongestive exercises and self massage    Therapeutic Exercise Min    10   Manual Therapy Min 30 45 45 30   Self Care Home Mgmt Min 12   15   Total Timed Procedures 42 45 45 55   Total Service Procedures 42 45 45 55   Total Time 42 45 45 55         HEP   Deep diaphragmatic breathing   Self massage    Charges     TE x 1. manual therapy x 2, self care x 1             Plan  Patient will be seen 1-2 x per weekx/week or a total of 15 visits over a 90 day period. Treatment will include: MLD; Compression; Skin care; Manual therapy; Therapeutic exercises; Home exercise program instructions    Treatment Last 4 Visits        6/12/2025 6/17/2025 6/19/2025 7/2/2025   OT Treatment   Treatment Day 6 7 8 9   Therapeutic Exercise Measurements/pn      Manual Therapy Deep diaphragmatic breathing      MLD to bilateral Lower quadrant     Applied Farrow foot pieces and leg pieces at the end of session. The distal end of the leg wrap and the foot wrap were wrapped tighter Ankle measurement   Right ankle  24.1 cm  Left ankle 23.9 cm    Wraps removed  Deep diaphragmatic breathing      MLD to bilateral Lower quadrant      Reviewed proper technique for don/doffing the wraps.    Discussed getting measured within the next 1-2 weeks for open toe class 2 knee or thigh high garments Deep diaphragmatic breathing      MLD to bilateral Lower quadrant     Applied kinesiotape at the dorsum of the ankles and feet to improve lymphatic flow     Applied size D tubigrip bilateral legs with rosidal soft around the ankles and farrow leg and foot wraps   Discussed safely/lymphedema precautions during travel Mld to bilateral lower quadrant.     Deep diaphragmatic breathing       Discussed that pt. Can attend therapy just 1 x per week to monitor progress    Self Care Home Mgmt Time spent reviewing decongestive exercises and self massage    Review of decongestive exercises and self massage. Discussed that once she receives her custom garments, she can wear the farrow wraps at night and the custom thigh highs during the day.Pt. and therapist agree, that pt. Can get by with self massage, and will not need the home lymphedema pump   Therapeutic Exercise Min 10      Manual Therapy Min 30 45 55 25   Self Care Home Mgmt Min 15   15   Total Timed Procedures 55 45 55 40   Total Service Procedures 55 45 55 40   Total Time 55 45 55 40         HEP  Decongestive exercises and self massage     Charges     manual therapy x 2, self care x 1

## 2025-07-08 ENCOUNTER — OFFICE VISIT (OUTPATIENT)
Dept: OCCUPATIONAL MEDICINE | Facility: HOSPITAL | Age: 45
End: 2025-07-08
Attending: FAMILY MEDICINE
Payer: COMMERCIAL

## 2025-07-08 PROCEDURE — 97140 MANUAL THERAPY 1/> REGIONS: CPT

## 2025-07-08 NOTE — PROGRESS NOTES
Patient: Daniela Buenrostro (44 year old, female) Referring Provider:  Insurance:   Diagnosis: Associated DX:  Bilateral leg edema (R60.0)  S/P bilateral mastectomy (Z90.13)  Lymphatic edema (I89.0) No ref. provider found  BCBS POS   Date of Surgery: April 2023 mastectomy and reconstruction in 2023. fat grafting was in august in 2023 Next MD visit:  N/A   Precautions:    No data recorded Referral Information:   Date of Injury: No data recorded Date of Evaluation: Req: 0, Auth: 0, Exp:     04/29/25 POC Auth Visits:  15      Today's Date   7/8/2025    Subjective  Pt. reports that she still cannot tolerate wearing the velcro wraps at night due to leg cramps       Pain: 0 /10     Objective  Patient attends therapy today with her  farrow wraps and foot pieces on both legs       Assessment  Pt.  may benefit from a trial sample of the farrow strong wrap at night to see if she still gets leg cramps    Goals (to be met in 15 visits))   1 Pt will be independent in daily skin care.  2 Pt will tolerate right and left  modified compression via Tensogrip sleeve for 6-8 hours.  3 Pt will be independent in decongestive exercises.  4 Pt will be independent in self-manual lymph drainage.  5 Pt will tolerate right and left  LE short stretch compression bandaging/velcro reduction  wraps  for 20-23 hours.  6 Pt/Caregiver will be independent in right and left  LE short stretch compression bandaging/velcro reduction wraps   7 Reduce right and left  LE lymphedema volume by 10 cm to allow pt to wear shoes and socks more comfortably .  8 Reduce right and left  LE lymphedema density at the ankle  to soft and supple  to reduce infection risk.  9 Pt will be independent in use of compression garments, self-manual lymph drainage, decongestive exercises and lymphedema precautions for life-long self-management of lymphedema.                         Lymph Calculations       4/29/2025 6/12/2025   OT Lymphedema Calculations   10cm above SBP 45 cm  44.2 cm   SBP 39.8 cm 38.1 cm   5cm BELOW SBP 39.2 cm 37.5 cm   10cm below SBP 33.5 cm 33.2 cm   20cm below SBP 36.2 cm 34.4 cm   30cm below SBP 29 cm 28 cm   35cm below SBP 23.8 cm 23.1 cm   40cm below SBP 22.4 cm 21.5 cm   Lateral Malleoli 25.6 cm 25.5 cm   7cm prox to 1st toe web space 22 cm 21.8 cm   5cm prox to 1st toe web space 20.9 cm 20.8 cm   RLE Total 337.4 cm 328.1 cm   10cm above SBP 46.5 cm 48.5 cm   SBP 41.5 cm 39.8 cm   5cm below SBP 37.5 cm 37.5 cm   10cm below SBP 34.5 cm 33.5 cm   20cm below SBP 36.5 cm 35.6 cm   30cm below SBP 31 cm 28 cm   35cm below SBP 24.1 cm 24 cm   40cm below SBP 22.8 cm 22.8 cm   Lateral Malleoli 25.1 cm 25.8 cm   7cm prox to 1st toe web space 23 cm 22.6 cm   5cm prox to 1st toe web space 21.1 cm 22.2 cm   LLE Total 343.6 cm 340.3 cm          Plan: Continue skilled Occupational Therapy 1-2 x/week or a total of 15 visits over a 90 day period 9 remaining Treatment will include: CDT       Patient/Family/Caregiver was advised of these findings, precautions, and treatment options and has agreed to actively participate in planning and for this course of care.    Thank you for your referral. If you have any questions, please contact me at Dept: [phone].    Sincerely,  Electronically signed by therapist: Meenakshi Ng OT     Physician's certification required:  No  Please co-sign or sign and return this letter via fax as soon as possible to [phone].   I certify the need for these services furnished under this plan of treatment and while under my care.    X___________________________________________________ Date____________________    Treatment Last 4 Visits       5/23/2025 5/28/2025 5/30/2025 6/12/2025   OT Treatment   Treatment Day 4 5 6 6   Therapeutic Exercise    Measurements/pn   Manual Therapy Deep diaphragmatic breathing     MLD to bilateral Lower quadrant    Deep diaphragmatic breathing      MLD to bilateral Lower quadrant     Discussed loosening the leg and  foot wraps at night for increased comfort     Applied a rosidal foam insert around the ankle for increased comfort against the achilles tendon  Pt. Not interested in the home pneumatic pump  Deep diaphragmatic breathing      MLD to bilateral Lower quadrant     Applied size D tubigrip bilateral legs with rosidal soft around the ankles and farrow leg and foot wraps .  Deep diaphragmatic breathing      MLD to bilateral Lower quadrant     Applied Farrow foot pieces and leg pieces at the end of session. The distal end of the leg wrap and the foot wrap were wrapped tighter   Self Care Home Mgmt Instructed pt. How to don/doff the new farrow wraps and discussed wear and care. Pt. Will get measured for custom knee high garments when appropriate   Time spent reviewing decongestive exercises and self massage    Therapeutic Exercise Min    10   Manual Therapy Min 30 45 45 30   Self Care Home Mgmt Min 12   15   Total Timed Procedures 42 45 45 55   Total Service Procedures 42 45 45 55   Total Time 42 45 45 55         HEP   Deep diaphragmatic breathing   Self massage    Charges     TE x 1. manual therapy x 2, self care x 1                 Plan  Patient will be seen 1-2 x per weekx/week or a total of 15 visits over a 90 day period. Treatment will include: MLD; Compression; Skin care; Manual therapy; Therapeutic exercises; Home exercise program instructions    Treatment Last 4 Visits       6/17/2025 6/19/2025 7/2/2025 7/8/2025   OT Treatment   Treatment Day 7 8 9 10   Manual Therapy Ankle measurement   Right ankle  24.1 cm  Left ankle 23.9 cm    Wraps removed  Deep diaphragmatic breathing      MLD to bilateral Lower quadrant     Reviewed proper technique for don/doffing the wraps.    Discussed getting measured within the next 1-2 weeks for open toe class 2 knee or thigh high garments Deep diaphragmatic breathing      MLD to bilateral Lower quadrant     Applied kinesiotape at the dorsum of the ankles and feet to improve lymphatic  flow     Applied size D tubigrip bilateral legs with rosidal soft around the ankles and farrow leg and foot wraps   Discussed safely/lymphedema precautions during travel Mld to bilateral lower quadrant.     Deep diaphragmatic breathing       Discussed that pt. Can attend therapy just 1 x per week to monitor progress  Mld to bilateral lower quadrant.     Deep diaphragmatic breathing     Discussed giving the Farrow wrap strong sample a try at night to see if it prevents the leg cramps    Self Care Home Mgmt   Review of decongestive exercises and self massage. Discussed that once she receives her custom garments, she can wear the farrow wraps at night and the custom thigh highs during the day.Pt. and therapist agree, that pt. Can get by with self massage, and will not need the home lymphedema pump    Manual Therapy Min 45 55 25 40   Self Care Home Mgmt Min   15    Total Timed Procedures 45 55 40 40   Total Service Procedures 45 55 40 40   Total Time 45 55 40 40         HEP  See flow sheet    Charges     manual therapy x 3

## 2025-07-10 ENCOUNTER — APPOINTMENT (OUTPATIENT)
Dept: OCCUPATIONAL MEDICINE | Facility: HOSPITAL | Age: 45
End: 2025-07-10
Payer: COMMERCIAL

## 2025-07-15 ENCOUNTER — APPOINTMENT (OUTPATIENT)
Dept: OCCUPATIONAL MEDICINE | Facility: HOSPITAL | Age: 45
End: 2025-07-15
Payer: COMMERCIAL

## 2025-07-16 PROBLEM — R60.0 BILATERAL LEG EDEMA: Status: ACTIVE | Noted: 2025-07-16

## 2025-07-17 ENCOUNTER — OFFICE VISIT (OUTPATIENT)
Dept: OCCUPATIONAL MEDICINE | Facility: HOSPITAL | Age: 45
End: 2025-07-17
Attending: FAMILY MEDICINE
Payer: COMMERCIAL

## 2025-07-17 PROCEDURE — 97140 MANUAL THERAPY 1/> REGIONS: CPT

## 2025-07-17 NOTE — PROGRESS NOTES
Patient: Daniela Buenrostro (44 year old, female) Referring Provider:  Insurance:   Diagnosis: Associated DX:  Bilateral leg edema (R60.0)  S/P bilateral mastectomy (Z90.13)  Lymphatic edema (I89.0) No ref. provider found  BCBS POS   Date of Surgery: April 2023 mastectomy and reconstruction in 2023. fat grafting was in august in 2023 Next MD visit:  N/A   Precautions:    No data recorded Referral Information:   Date of Injury: No data recorded Date of Evaluation: Req: 0, Auth: 0, Exp:     04/29/25 POC Auth Visits:  15      Today's Date   7/17/2025    Subjective  Patient reports that she is ok at night sleeping with the tensogrip sleeves on her right and left leg at night . She also reports that her new compression garment have arrived       Pain: 0/10     Objective  Patient attends therapy today with her  farrow wraps and foot pieces on both legs       Assessment  Pt. has met her below stated goals, and will be ready for discharge next visit    Goals (to be met in 15 visits))   1 Pt will be independent in daily skin care.  2 Pt will tolerate right and left  modified compression via Tensogrip sleeve for 6-8 hours.  3 Pt will be independent in decongestive exercises.  4 Pt will be independent in self-manual lymph drainage.  5 Pt will tolerate right and left  LE short stretch compression bandaging/velcro reduction  wraps  for 20-23 hours.  6 Pt/Caregiver will be independent in right and left  LE short stretch compression bandaging/velcro reduction wraps   7 Reduce right and left  LE lymphedema volume by 10 cm to allow pt to wear shoes and socks more comfortably .  8 Reduce right and left  LE lymphedema density at the ankle  to soft and supple  to reduce infection risk.  9 Pt will be independent in use of compression garments, self-manual lymph drainage, decongestive exercises and lymphedema precautions for life-long self-management of lymphedema.                         Lymph Calculations       4/29/2025 6/12/2025    OT Lymphedema Calculations   10cm above SBP 45 cm 44.2 cm   SBP 39.8 cm 38.1 cm   5cm BELOW SBP 39.2 cm 37.5 cm   10cm below SBP 33.5 cm 33.2 cm   20cm below SBP 36.2 cm 34.4 cm   30cm below SBP 29 cm 28 cm   35cm below SBP 23.8 cm 23.1 cm   40cm below SBP 22.4 cm 21.5 cm   Lateral Malleoli 25.6 cm 25.5 cm   7cm prox to 1st toe web space 22 cm 21.8 cm   5cm prox to 1st toe web space 20.9 cm 20.8 cm   RLE Total 337.4 cm 328.1 cm   10cm above SBP 46.5 cm 48.5 cm   SBP 41.5 cm 39.8 cm   5cm below SBP 37.5 cm 37.5 cm   10cm below SBP 34.5 cm 33.5 cm   20cm below SBP 36.5 cm 35.6 cm   30cm below SBP 31 cm 28 cm   35cm below SBP 24.1 cm 24 cm   40cm below SBP 22.8 cm 22.8 cm   Lateral Malleoli 25.1 cm 25.8 cm   7cm prox to 1st toe web space 23 cm 22.6 cm   5cm prox to 1st toe web space 21.1 cm 22.2 cm   LLE Total 343.6 cm 340.3 cm          Plan: Continue skilled Occupational Therapy 1-2 x/week or a total of 15 visits over a 90 day period 9 remaining Treatment will include: CDT       Patient/Family/Caregiver was advised of these findings, precautions, and treatment options and has agreed to actively participate in planning and for this course of care.    Thank you for your referral. If you have any questions, please contact me at Dept: [phone].    Sincerely,  Electronically signed by therapist: Meenakshi Ng, OT     Physician's certification required:  No  Please co-sign or sign and return this letter via fax as soon as possible to [phone].   I certify the need for these services furnished under this plan of treatment and while under my care.    X___________________________________________________ Date____________________    Treatment Last 4 Visits       5/23/2025 5/28/2025 5/30/2025 6/12/2025   OT Treatment   Treatment Day 4 5 6 6   Therapeutic Exercise    Measurements/pn   Manual Therapy Deep diaphragmatic breathing     MLD to bilateral Lower quadrant    Deep diaphragmatic breathing      MLD to bilateral  Lower quadrant     Discussed loosening the leg and foot wraps at night for increased comfort     Applied a rosidal foam insert around the ankle for increased comfort against the achilles tendon  Pt. Not interested in the home pneumatic pump  Deep diaphragmatic breathing      MLD to bilateral Lower quadrant     Applied size D tubigrip bilateral legs with rosidal soft around the ankles and farrow leg and foot wraps .  Deep diaphragmatic breathing      MLD to bilateral Lower quadrant     Applied Farrow foot pieces and leg pieces at the end of session. The distal end of the leg wrap and the foot wrap were wrapped tighter   Self Care Home Mgmt Instructed pt. How to don/doff the new farrow wraps and discussed wear and care. Pt. Will get measured for custom knee high garments when appropriate   Time spent reviewing decongestive exercises and self massage    Therapeutic Exercise Min    10   Manual Therapy Min 30 45 45 30   Self Care Home Mgmt Min 12   15   Total Timed Procedures 42 45 45 55   Total Service Procedures 42 45 45 55   Total Time 42 45 45 55         HEP   Deep diaphragmatic breathing   Self massage    Charges     TE x 1. manual therapy x 2, self care x 1                     Plan  Discharge next visit    Treatment Last 4 Visits       6/19/2025 7/2/2025 7/8/2025 7/17/2025   OT Treatment   Treatment Day 8 9 10 11   Manual Therapy Deep diaphragmatic breathing      MLD to bilateral Lower quadrant     Applied kinesiotape at the dorsum of the ankles and feet to improve lymphatic flow     Applied size D tubigrip bilateral legs with rosidal soft around the ankles and farrow leg and foot wraps   Discussed safely/lymphedema precautions during travel Mld to bilateral lower quadrant.     Deep diaphragmatic breathing       Discussed that pt. Can attend therapy just 1 x per week to monitor progress  Mld to bilateral lower quadrant.     Deep diaphragmatic breathing     Discussed giving the Farrow wrap strong sample a try at  night to see if it prevents the leg cramps  Pt. Fitted with size C tensogrip sleeves with 3 ply over the ankle and foot. She will wear the tensogrip at night and the Farrow wraps during the day until she gets her custom knee highs.      Performed MLD to right and left lower quadrant    Applied and reviewed proper placement of lower strap on the farrow wraps    Self Care Home Mgmt  Review of decongestive exercises and self massage. Discussed that once she receives her custom garments, she can wear the farrow wraps at night and the custom thigh highs during the day.Pt. and therapist agree, that pt. Can get by with self massage, and will not need the home lymphedema pump     Manual Therapy Min 55 25 40 45   Self Care Home Mgmt Min  15     Total Timed Procedures 55 40 40 45   Total Service Procedures 55 40 40 45   Total Time 55 40 40 45         HEP  Continue self massage and decongestive exercises     Charges     manual therapy x 3

## 2025-07-25 ENCOUNTER — OFFICE VISIT (OUTPATIENT)
Dept: OCCUPATIONAL MEDICINE | Facility: HOSPITAL | Age: 45
End: 2025-07-25
Attending: FAMILY MEDICINE
Payer: COMMERCIAL

## 2025-07-25 PROCEDURE — 97535 SELF CARE MNGMENT TRAINING: CPT

## 2025-07-25 PROCEDURE — 97110 THERAPEUTIC EXERCISES: CPT

## 2025-08-19 ENCOUNTER — PATIENT MESSAGE (OUTPATIENT)
Dept: FAMILY MEDICINE CLINIC | Facility: CLINIC | Age: 45
End: 2025-08-19

## 2025-08-19 DIAGNOSIS — I89.0 LYMPHATIC EDEMA: Primary | ICD-10-CM

## (undated) DEVICE — TRAY SURESTEP 16 BARDEX UMETR

## (undated) DEVICE — SIZER BREAST IMPLANT FP 365CC

## (undated) DEVICE — CURAD PAPER TAPE 2IN

## (undated) DEVICE — 3M™ STERI-STRIP™ REINFORCED ADHESIVE SKIN CLOSURES, R1548, 1 IN X 5 IN (25 MM X 125 MM), 4 STRIPS/ENVELOPE: Brand: 3M™ STERI-STRIP™

## (undated) DEVICE — DERMABOND CLOSURE 0.7ML TOPICL

## (undated) DEVICE — STERILE POLYISOPRENE POWDER-FREE SURGICAL GLOVES: Brand: PROTEXIS

## (undated) DEVICE — SYRINGE 50ML LL TIP

## (undated) DEVICE — VIOLET BRAIDED (POLYGLACTIN 910), SYNTHETIC ABSORBABLE SUTURE: Brand: COATED VICRYL

## (undated) DEVICE — PLASTIC BREAST CDS-LF: Brand: MEDLINE INDUSTRIES, INC.

## (undated) DEVICE — LIGHT HANDLE

## (undated) DEVICE — SUT MONOCRYL 4-0 PS-2 Y496G

## (undated) DEVICE — PROXIMATE SKIN STAPLERS (35 WIDE) CONTAINS 35 STAINLESS STEEL STAPLES (FIXED HEAD): Brand: PROXIMATE

## (undated) DEVICE — SLEEVE KENDALL SCD EXPRESS MED

## (undated) DEVICE — LAPAROTOMY SPONGE - RF AND X-RAY DETECTABLE PRE-WASHED: Brand: SITUATE

## (undated) DEVICE — PEN SKIN MARKING REG TIP VIOLT

## (undated) DEVICE — SUT VICRYL 3-0 SH J416H

## (undated) DEVICE — DRAIN ROUND HUBLESS 15FR

## (undated) DEVICE — BREAST-HERNIA-PORT CDS-LF: Brand: MEDLINE INDUSTRIES, INC.

## (undated) DEVICE — GOWN,SIRUS,FABRIC-REINFORCED,X-LARGE: Brand: MEDLINE

## (undated) DEVICE — PROVE COVER: Brand: UNBRANDED

## (undated) DEVICE — SYRINGE 10ML LL TIP

## (undated) DEVICE — 40580 - THE PINK PAD - ADVANCED TRENDELENBURG POSITIONING KIT: Brand: 40580 - THE PINK PAD - ADVANCED TRENDELENBURG POSITIONING KIT

## (undated) DEVICE — EVACUATOR RELIAVAC 100CC

## (undated) DEVICE — SUT MONOCRYL 4-0 PS-2 Y426H

## (undated) DEVICE — SOL PREP 4OZ 10% POVIDONE IOD

## (undated) DEVICE — SOL NACL IRRIG 0.9% 1000ML BTL

## (undated) DEVICE — SYRINGE 5ML LL TIP

## (undated) DEVICE — GAUZE,SPONGE,FLUFF,6"X6.75",STRL,5/TRAY: Brand: MEDLINE

## (undated) DEVICE — MEGADYNE E-Z CLEAN BLADE 2.75"

## (undated) DEVICE — CG INFILTRATION TUBING: Brand: CG INFILTRATION TUBING

## (undated) DEVICE — ALCOHOL 70% 4 OZ

## (undated) DEVICE — SCRUB PVP -1 PREP SOLUTION 4OZ

## (undated) DEVICE — STOPCOCK IV 4 WAY BD

## (undated) DEVICE — SYRINGE,TOOMEY,IRRIGATION,70CC,STERILE: Brand: MEDLINE

## (undated) DEVICE — DRAPE SURG 18X24

## (undated) DEVICE — SUT SILK 0 FSL 678G

## (undated) DEVICE — 3M™ IOBAN™ 2 ANTIMICROBIAL INCISE DRAPE 6651EZ: Brand: IOBAN™ 2

## (undated) DEVICE — 3M™ TEGADERM™ TRANSPARENT FILM DRESSING FRAME STYLE, 1624W, US-VER, 100/CARTON 4 CARTONS/CASE: Brand: 3M™ TEGADERM™

## (undated) DEVICE — SUT PDS II 2-0 CT-2 Z333H

## (undated) DEVICE — PENCIL TELESCOPE MEGADYNE SE

## (undated) DEVICE — SUT ETHILON 3-0 FS-1 663H

## (undated) DEVICE — SUT PROLENE 2-0 SH 8833H

## (undated) DEVICE — DRESSING BIOPATCH 1X4 BLUE

## (undated) DEVICE — Device

## (undated) DEVICE — VIAL LABORATORY SPY

## (undated) DEVICE — 1010 S-DRAPE TOWEL DRAPE 10/BX: Brand: STERI-DRAPE™

## (undated) NOTE — MR AVS SNAPSHOT
[address], [place] [address] [place] [address] [phone]               Thank you for choosing us for your health care visit with Amari Mckeon MD.  We are glad to serve you and happy to provide you with this jane For medical emergencies, dial 911.            Visit [facility] online at  [email or web address]

## (undated) NOTE — MR AVS SNAPSHOT
[facility] [address], [facility] [address]  [phone]               Thank you for choosing us for your health care visit with Abundio Brunner MD.  We are glad to serve you and happy to provide you with this Today's Vital Signs     BP Weight                124/60 mmHg 140 lb             Current Medications          This list is accurate as of: 2/3/17  2:51 PM.  Always use your most recent med list.                PRENATAL 28-0.8 MG Tabs   Take by mouth.

## (undated) NOTE — Clinical Note
I had the pleasure of seeing Ana Travis on 4/25/2023. Please see my attached note.   Mely Villarreal MD FACS [facility]

## (undated) NOTE — Clinical Note
I had the pleasure of seeing Narda Melissa on 1/10/2023. Please see my attached note.   Erlinda Vallecillo MD FACS [facility]

## (undated) NOTE — MR AVS SNAPSHOT
[facility] [address], [facility] [address] [facility]  [phone]               Thank you for choosing us for your health care visit with Pat Casas MD.  We are glad to serve you and happy to provide you with t [phone]              Allergies as of Feb 10, 2017     No Known Allergies                   Current Medications          This list is accurate as of: 2/10/17  2:42 PM.  Always use your most recent med list.                PRENATAL 28-0.8 MG Tabs   Take

## (undated) NOTE — Clinical Note
Daily 81 mg ASA and continue until 36-37 weeks Follow-up Growth ultrasound at 30 & 36 weeks. Weekly NST's at 36 weeks.

## (undated) NOTE — Clinical Note
IMPRESSION: IUP at 34w2d AMA: low-risk cell free fetal DNA, declined invasive testing Small AC on Office U/S: AGA growth and normal AC measurement on today's ultrasound  RECOMMENDATIONS: Continue care with Dr. Graciela Valdez Weekly NST's at 42 weeks.

## (undated) NOTE — Clinical Note
I had the pleasure of seeing Isidro Mcgee on 10/17/2022. Please see my attached note.     Natalie Brown MD FACS  [facility]

## (undated) NOTE — MR AVS SNAPSHOT
After Visit Summary   2017    Silvana Mujica    MRN: [id number]           Visit Information        Provider Department Dept Phone    2017  2:30 PM [address] PNORM1   Outpt [phone]      Your Vitals Were     BP Pulse Wt LMP [address] [facility], [address], [place]: [phone]  [address] [facility] [address]: [phone]  [address] [place] [address]: [phone]    Important note regarding exams that require a referral/authoriza Moderation of alcohol consumption Men: limit to <= 2 drinks* per day. Women and lighter weight persons: limit to <= 1 drink* per day. [facility] now offers Video Visits through [address] for adult and pediatric patients.   Video Visits are a

## (undated) NOTE — MR AVS SNAPSHOT
[facility] [address], [address]  [phone]               Thank you for choosing us for your health care visit with Jamari Steele MD.  We are glad to serve you and happy to provide you with

## (undated) NOTE — IP AVS SNAPSHOT
[facility] [place] [address] [place], [address] ~ [phone]                Discharge Summary   2/14/2017    [address]           Admission Information        Provider Department    2/14/2017 Dea Knott MD  2sw-J OB Lab Results  (Last 3 results in the past 270 days)    Blood Type Rh Factor Rubella GBBS    (02/14/17)  O (02/14/17)  Positive (09/02/16)  Positive --    (09/02/16)  O (09/02/16)  Positive        Recent Hematology Lab Results  (Last 3 results in the past Antibiotics Resolved   Psychosocial/Spiritual Support Resolved   Community Resources Resolved   Preeclampsia/Hypertension Resolved         Handwashing & Infection Prevention  Resolved   Handwashing and Respiratory Hygiene Resolved               Additional discharge instructions in [facility] by going to Visits < Admission Summaries. If you've been to the Emergency Department or your doctor's office, you can view your past visit information in [facility] [employer] by going to Visits < Visit Summaries. [facility] questions?

## (undated) NOTE — MR AVS SNAPSHOT
[facility] [address] [place] [address] [address] [facility]  [address] [address] [phone]               Thank you for choosing us for your health care visit with Lv Cruz MD.  We are glad to serve you and happy to provide you wit These medications were sent to [place] [address] [address] [facility], [address] [place] [address] [address] [place] [address], [phone], [address] [place] [address], [place] [address]     Phone:  [phone]    - predniSONE 20 MG Tabs

## (undated) NOTE — MR AVS SNAPSHOT
[address], [facility] [address] [address] [phone]               Thank you for choosing us for your health care visit with Erica Cloud MD.  We are glad to serve you and happy to provide you with this summary Lifestyle Modification Recommendations:    Modification Recommendation   Weight Reduction Maintain normal body weight (body mass index 18.5-24.9 kg/m2)   DASH eating plan Adopt a diet rich in fruits, vegetables, and low fat dairy products with reduced cont

## (undated) NOTE — Clinical Note
IMPRESSION: IUP at 34w2d AMA: low-risk cell free fetal DNA, declined invasive testing Small AC on Office U/S: AGA growth (although the overall EFW is at the 25th percentile) and normal AC measurement on today's ultrasound  RECOMMENDATIONS: Continue care wi

## (undated) NOTE — MR AVS SNAPSHOT
[facility] [address], [facility] [address]  [phone]               Thank you for choosing us for your health care visit with Jt Rudolph MD.  We are glad to serve you and happy to provide you with this very painful though it is not always easy to tell. You may feel contractions, cramps or uterine tightening somewhere between every 3-30 minutes but they will not continue to get stronger over time.   If you lie down, drink plenty of fluid or walk around, t ? When you think you are ready to go to the hospital.    Who do I call?   ? During office hours, Monday, Tuesday and Thursdays between 9am and 6:30pm, Wednesday’s between 9am and 5pm, Friday’s between 9am and 3pm and Saturday’s between 9am and 12pm, call th We utilize intravenous (IV) narcotics and epidural anesthesia when our patients request to have them. If you chose to have no anesthesia, none will be administered. A local anesthetic may be used at the time of delivery. ?  After delivery, a pain pill w Remember, early ambulation in the hospital is to prevent complications. Do not let this lull you into a false sense of strength or ability to do certain physical acts which may tire you excessively.   Please call the office within a few days after you are If you've recently had a stay at the Hospital you can access your discharge instructions in [employer] by going to Visits < Admission Summaries.  If you've been to the Emergency Department or your doctor's office, you can view your past visit information in My

## (undated) NOTE — MR AVS SNAPSHOT
[facility] [address], [address]  [phone]               Thank you for choosing us for your health care visit with Barbara Escalante MD.  We are glad to serve you and happy to provide you with Again, If the baby has not moved 10 times by 6:00 pm., you need to contact us. If our office is closed, the answering service will page the doctor on-call.     [facility]  [phone]       Allergies as of Jan 16, 2017     No Known [facility]:  US OB FOLLOW UP SPECIFIC CONDITION PER FETUS [facility] ONLY          Referral Orders      Normal Orders This Visit    OP REFERRAL TO  CONSULT [[id number] CUSTOM]  Order #:  [id number]         **REFERRAL REQUEST**    Your physician has referred office, you can view your past visit information in [employer] by going to Visits < Visit Summaries. [employer] questions? Call [phone] for help. [employer] is NOT to be used for urgent needs. For medical emergencies, dial 911.            Visit [facility]